# Patient Record
Sex: MALE | Race: WHITE | Employment: FULL TIME | URBAN - METROPOLITAN AREA
[De-identification: names, ages, dates, MRNs, and addresses within clinical notes are randomized per-mention and may not be internally consistent; named-entity substitution may affect disease eponyms.]

---

## 2017-03-06 ENCOUNTER — OFFICE VISIT (OUTPATIENT)
Dept: FAMILY MEDICINE CLINIC | Age: 57
End: 2017-03-06

## 2017-03-06 VITALS
DIASTOLIC BLOOD PRESSURE: 62 MMHG | BODY MASS INDEX: 35.05 KG/M2 | SYSTOLIC BLOOD PRESSURE: 122 MMHG | RESPIRATION RATE: 16 BRPM | HEART RATE: 68 BPM | WEIGHT: 244.8 LBS | HEIGHT: 70 IN

## 2017-03-06 DIAGNOSIS — M15.9 PRIMARY OSTEOARTHRITIS INVOLVING MULTIPLE JOINTS: ICD-10-CM

## 2017-03-06 DIAGNOSIS — G47.33 OBSTRUCTIVE SLEEP APNEA ON CPAP: ICD-10-CM

## 2017-03-06 DIAGNOSIS — E78.00 HYPERCHOLESTEROLEMIA: ICD-10-CM

## 2017-03-06 DIAGNOSIS — Z99.89 OBSTRUCTIVE SLEEP APNEA ON CPAP: ICD-10-CM

## 2017-03-06 DIAGNOSIS — Z51.81 MEDICATION MONITORING ENCOUNTER: ICD-10-CM

## 2017-03-06 DIAGNOSIS — I10 ESSENTIAL HYPERTENSION: Primary | ICD-10-CM

## 2017-03-06 DIAGNOSIS — R73.01 IFG (IMPAIRED FASTING GLUCOSE): ICD-10-CM

## 2017-03-06 PROCEDURE — G8427 DOCREV CUR MEDS BY ELIG CLIN: HCPCS | Performed by: FAMILY MEDICINE

## 2017-03-06 PROCEDURE — 3017F COLORECTAL CA SCREEN DOC REV: CPT | Performed by: FAMILY MEDICINE

## 2017-03-06 PROCEDURE — G8484 FLU IMMUNIZE NO ADMIN: HCPCS | Performed by: FAMILY MEDICINE

## 2017-03-06 PROCEDURE — 1036F TOBACCO NON-USER: CPT | Performed by: FAMILY MEDICINE

## 2017-03-06 PROCEDURE — 93000 ELECTROCARDIOGRAM COMPLETE: CPT | Performed by: FAMILY MEDICINE

## 2017-03-06 PROCEDURE — 99214 OFFICE O/P EST MOD 30 MIN: CPT | Performed by: FAMILY MEDICINE

## 2017-03-06 PROCEDURE — G8417 CALC BMI ABV UP PARAM F/U: HCPCS | Performed by: FAMILY MEDICINE

## 2017-03-06 ASSESSMENT — PATIENT HEALTH QUESTIONNAIRE - PHQ9
1. LITTLE INTEREST OR PLEASURE IN DOING THINGS: 1
SUM OF ALL RESPONSES TO PHQ QUESTIONS 1-9: 2
SUM OF ALL RESPONSES TO PHQ9 QUESTIONS 1 & 2: 2
2. FEELING DOWN, DEPRESSED OR HOPELESS: 1

## 2017-03-06 ASSESSMENT — ENCOUNTER SYMPTOMS
GASTROINTESTINAL NEGATIVE: 1
EYES NEGATIVE: 1
COUGH: 0
DIARRHEA: 0
NAUSEA: 0
APNEA: 1
WHEEZING: 0

## 2017-03-09 LAB
ABSOLUTE BASO #: 0 K/UL (ref 0–0.1)
ABSOLUTE EOS #: 0.1 K/UL (ref 0.1–0.4)
ABSOLUTE LYMPH #: 1.7 K/UL (ref 0.8–5.2)
ABSOLUTE MONO #: 0.4 K/UL (ref 0.1–0.9)
ABSOLUTE NEUT #: 1.8 K/UL (ref 1.3–9.1)
ALBUMIN SERPL-MCNC: 4.8 G/DL (ref 3.2–5.3)
ALK PHOSPHATASE: 46 IU/L (ref 35–121)
ALT SERPL-CCNC: 20 IU/L (ref 5–59)
ANION GAP SERPL CALCULATED.3IONS-SCNC: 12 MMOL/L
AST SERPL-CCNC: 17 IU/L (ref 10–42)
AVERAGE GLUCOSE: 108 MG/DL (ref 66–114)
BASOPHILS RELATIVE PERCENT: 0.3 % (ref 0–1)
BILIRUB SERPL-MCNC: 0.4 MG/DL (ref 0.2–1.3)
BUN BLDV-MCNC: 19 MG/DL (ref 10–20)
CALCIUM SERPL-MCNC: 10.1 MG/DL (ref 8.7–10.8)
CHLORIDE BLD-SCNC: 104 MMOL/L (ref 95–111)
CHOLESTEROL, TOTAL: 220 MG/DL
CHOLESTEROL/HDL RATIO: 3.4
CHOLESTEROL/HDL RATIO: 3.4
CHOLESTEROL: 220 MG/DL
CO2: 27 MMOL/L (ref 21–32)
CREAT SERPL-MCNC: 1.1 MG/DL (ref 0.5–1.3)
EGFR AFRICAN AMERICAN: 84
EGFR IF NONAFRICAN AMERICAN: 69
EOSINOPHILS RELATIVE PERCENT: 1.3 % (ref 1–4)
GLUCOSE: 98 MG/DL (ref 70–100)
HBA1C MFR BLD: 5.4 %
HBA1C MFR BLD: 5.4 % (ref 4.2–5.8)
HCT VFR BLD CALC: 44 % (ref 41.4–51)
HDLC SERPL-MCNC: 65 MG/DL (ref 35–70)
HDLC SERPL-MCNC: 65 MG/DL (ref 40–60)
HEMOGLOBIN: 15.2 G/DL (ref 13.8–17)
LDL CHOLESTEROL CALCULATED: 142 MG/DL
LDL CHOLESTEROL CALCULATED: 142 MG/DL (ref 0–160)
LDL/HDL RATIO: 2.2
LYMPHOCYTE %: 42.7 % (ref 16–48)
MCH RBC QN AUTO: 32.8 PG (ref 27–34)
MCHC RBC AUTO-ENTMCNC: 34.5 G/DL (ref 31–36)
MCV RBC AUTO: 94.8 FL (ref 80–100)
MONOCYTES # BLD: 10.1 % (ref 1–8)
NEUTROPHILS RELATIVE PERCENT: 45.3 % (ref 45–75)
PDW BLD-RTO: 12.2 % (ref 10.8–14.8)
PLATELETS: 165 K/UL (ref 150–450)
POTASSIUM SERPL-SCNC: 4.4 MMOL/L (ref 3.5–5.4)
RBC: 4.64 M/UL (ref 4–5.5)
SODIUM BLD-SCNC: 139 MMOL/L (ref 134–147)
TOTAL PROTEIN: 7.2 G/DL (ref 5.8–8)
TRIGL SERPL-MCNC: 66 MG/DL
TRIGL SERPL-MCNC: 66 MG/DL
VLDLC SERPL CALC-MCNC: 13 MG/DL
VLDLC SERPL CALC-MCNC: 13 MG/DL
WBC: 4 K/UL (ref 3.7–10.8)

## 2017-05-04 DIAGNOSIS — I10 ESSENTIAL HYPERTENSION: ICD-10-CM

## 2017-05-04 DIAGNOSIS — M48.02 CERVICAL STENOSIS OF SPINAL CANAL: ICD-10-CM

## 2017-05-04 DIAGNOSIS — M54.31 SCIATICA OF RIGHT SIDE: ICD-10-CM

## 2017-05-04 RX ORDER — IBUPROFEN 800 MG/1
TABLET ORAL
Qty: 360 TABLET | Refills: 1 | Status: SHIPPED | OUTPATIENT
Start: 2017-05-04 | End: 2017-10-31 | Stop reason: SDUPTHER

## 2017-05-04 RX ORDER — LISINOPRIL 20 MG/1
TABLET ORAL
Qty: 90 TABLET | Refills: 1 | Status: SHIPPED | OUTPATIENT
Start: 2017-05-04 | End: 2017-10-31 | Stop reason: SDUPTHER

## 2017-09-07 ENCOUNTER — OFFICE VISIT (OUTPATIENT)
Dept: FAMILY MEDICINE CLINIC | Age: 57
End: 2017-09-07
Payer: COMMERCIAL

## 2017-09-07 VITALS
TEMPERATURE: 98.6 F | HEIGHT: 70 IN | SYSTOLIC BLOOD PRESSURE: 110 MMHG | HEART RATE: 68 BPM | BODY MASS INDEX: 35.5 KG/M2 | WEIGHT: 248 LBS | RESPIRATION RATE: 16 BRPM | DIASTOLIC BLOOD PRESSURE: 70 MMHG

## 2017-09-07 DIAGNOSIS — N52.01 ERECTILE DYSFUNCTION DUE TO ARTERIAL INSUFFICIENCY: ICD-10-CM

## 2017-09-07 DIAGNOSIS — G47.33 OBSTRUCTIVE SLEEP APNEA ON CPAP: ICD-10-CM

## 2017-09-07 DIAGNOSIS — Z51.81 MEDICATION MONITORING ENCOUNTER: ICD-10-CM

## 2017-09-07 DIAGNOSIS — R25.2 MUSCLE CRAMPS: ICD-10-CM

## 2017-09-07 DIAGNOSIS — E66.9 OBESITY (BMI 30-39.9): ICD-10-CM

## 2017-09-07 DIAGNOSIS — R73.01 IFG (IMPAIRED FASTING GLUCOSE): ICD-10-CM

## 2017-09-07 DIAGNOSIS — M48.061 LUMBAR SPINAL STENOSIS: ICD-10-CM

## 2017-09-07 DIAGNOSIS — E78.00 HYPERCHOLESTEROLEMIA: ICD-10-CM

## 2017-09-07 DIAGNOSIS — M15.9 PRIMARY OSTEOARTHRITIS INVOLVING MULTIPLE JOINTS: ICD-10-CM

## 2017-09-07 DIAGNOSIS — I10 ESSENTIAL HYPERTENSION: Primary | ICD-10-CM

## 2017-09-07 DIAGNOSIS — Z99.89 OBSTRUCTIVE SLEEP APNEA ON CPAP: ICD-10-CM

## 2017-09-07 PROCEDURE — G8427 DOCREV CUR MEDS BY ELIG CLIN: HCPCS | Performed by: FAMILY MEDICINE

## 2017-09-07 PROCEDURE — 3017F COLORECTAL CA SCREEN DOC REV: CPT | Performed by: FAMILY MEDICINE

## 2017-09-07 PROCEDURE — G8417 CALC BMI ABV UP PARAM F/U: HCPCS | Performed by: FAMILY MEDICINE

## 2017-09-07 PROCEDURE — 99214 OFFICE O/P EST MOD 30 MIN: CPT | Performed by: FAMILY MEDICINE

## 2017-09-07 PROCEDURE — 1036F TOBACCO NON-USER: CPT | Performed by: FAMILY MEDICINE

## 2017-09-07 RX ORDER — SILDENAFIL 100 MG/1
100 TABLET, FILM COATED ORAL PRN
Qty: 18 TABLET | Refills: 3 | Status: SHIPPED | OUTPATIENT
Start: 2017-09-07 | End: 2018-03-08 | Stop reason: SDUPTHER

## 2017-09-07 ASSESSMENT — ENCOUNTER SYMPTOMS
COUGH: 0
BACK PAIN: 1
GASTROINTESTINAL NEGATIVE: 1
EYES NEGATIVE: 1
RHINORRHEA: 1
RESPIRATORY NEGATIVE: 1

## 2017-10-31 DIAGNOSIS — M48.02 CERVICAL STENOSIS OF SPINAL CANAL: ICD-10-CM

## 2017-10-31 DIAGNOSIS — I10 ESSENTIAL HYPERTENSION: ICD-10-CM

## 2017-10-31 DIAGNOSIS — M54.31 SCIATICA OF RIGHT SIDE: ICD-10-CM

## 2017-10-31 RX ORDER — LISINOPRIL 20 MG/1
TABLET ORAL
Qty: 90 TABLET | Refills: 1 | Status: SHIPPED | OUTPATIENT
Start: 2017-10-31 | End: 2018-03-08 | Stop reason: SDUPTHER

## 2017-10-31 RX ORDER — IBUPROFEN 800 MG/1
TABLET ORAL
Qty: 360 TABLET | Refills: 1 | Status: SHIPPED | OUTPATIENT
Start: 2017-10-31 | End: 2018-03-08 | Stop reason: SDUPTHER

## 2018-03-08 ENCOUNTER — OFFICE VISIT (OUTPATIENT)
Dept: FAMILY MEDICINE CLINIC | Age: 58
End: 2018-03-08
Payer: COMMERCIAL

## 2018-03-08 VITALS
HEIGHT: 70 IN | HEART RATE: 68 BPM | BODY MASS INDEX: 36.08 KG/M2 | OXYGEN SATURATION: 98 % | RESPIRATION RATE: 14 BRPM | DIASTOLIC BLOOD PRESSURE: 80 MMHG | SYSTOLIC BLOOD PRESSURE: 138 MMHG | WEIGHT: 252 LBS

## 2018-03-08 DIAGNOSIS — N52.01 ERECTILE DYSFUNCTION DUE TO ARTERIAL INSUFFICIENCY: ICD-10-CM

## 2018-03-08 DIAGNOSIS — M48.02 CERVICAL STENOSIS OF SPINAL CANAL: ICD-10-CM

## 2018-03-08 DIAGNOSIS — Z51.81 MEDICATION MONITORING ENCOUNTER: ICD-10-CM

## 2018-03-08 DIAGNOSIS — R73.01 IFG (IMPAIRED FASTING GLUCOSE): ICD-10-CM

## 2018-03-08 DIAGNOSIS — H69.81 DYSFUNCTION OF RIGHT EUSTACHIAN TUBE: ICD-10-CM

## 2018-03-08 DIAGNOSIS — F34.1 DYSTHYMIA: ICD-10-CM

## 2018-03-08 DIAGNOSIS — E78.00 HYPERCHOLESTEROLEMIA: ICD-10-CM

## 2018-03-08 DIAGNOSIS — I10 ESSENTIAL HYPERTENSION: Primary | ICD-10-CM

## 2018-03-08 DIAGNOSIS — M54.31 SCIATICA OF RIGHT SIDE: ICD-10-CM

## 2018-03-08 PROBLEM — H69.91 DYSFUNCTION OF RIGHT EUSTACHIAN TUBE: Status: ACTIVE | Noted: 2018-03-08

## 2018-03-08 PROCEDURE — 3017F COLORECTAL CA SCREEN DOC REV: CPT | Performed by: FAMILY MEDICINE

## 2018-03-08 PROCEDURE — 99214 OFFICE O/P EST MOD 30 MIN: CPT | Performed by: FAMILY MEDICINE

## 2018-03-08 PROCEDURE — G8417 CALC BMI ABV UP PARAM F/U: HCPCS | Performed by: FAMILY MEDICINE

## 2018-03-08 PROCEDURE — G8484 FLU IMMUNIZE NO ADMIN: HCPCS | Performed by: FAMILY MEDICINE

## 2018-03-08 PROCEDURE — 1036F TOBACCO NON-USER: CPT | Performed by: FAMILY MEDICINE

## 2018-03-08 PROCEDURE — G8427 DOCREV CUR MEDS BY ELIG CLIN: HCPCS | Performed by: FAMILY MEDICINE

## 2018-03-08 RX ORDER — LISINOPRIL 20 MG/1
TABLET ORAL
Qty: 90 TABLET | Refills: 3 | Status: SHIPPED | OUTPATIENT
Start: 2018-03-08 | End: 2018-04-29 | Stop reason: SDUPTHER

## 2018-03-08 RX ORDER — IBUPROFEN 800 MG/1
800 TABLET ORAL EVERY 6 HOURS PRN
Qty: 360 TABLET | Refills: 1 | Status: SHIPPED | OUTPATIENT
Start: 2018-03-08 | End: 2018-04-29 | Stop reason: SDUPTHER

## 2018-03-08 RX ORDER — SILDENAFIL 100 MG/1
100 TABLET, FILM COATED ORAL PRN
Qty: 18 TABLET | Refills: 3 | Status: SHIPPED | OUTPATIENT
Start: 2018-03-08 | End: 2019-02-20 | Stop reason: SDUPTHER

## 2018-03-08 ASSESSMENT — ENCOUNTER SYMPTOMS
EYES NEGATIVE: 1
SINUS PAIN: 0
SINUS PRESSURE: 0
ALLERGIC/IMMUNOLOGIC NEGATIVE: 1
GASTROINTESTINAL NEGATIVE: 1
RESPIRATORY NEGATIVE: 1

## 2018-03-08 ASSESSMENT — PATIENT HEALTH QUESTIONNAIRE - PHQ9
SUM OF ALL RESPONSES TO PHQ9 QUESTIONS 1 & 2: 0
2. FEELING DOWN, DEPRESSED OR HOPELESS: 0
SUM OF ALL RESPONSES TO PHQ QUESTIONS 1-9: 0
1. LITTLE INTEREST OR PLEASURE IN DOING THINGS: 0

## 2018-03-08 NOTE — PROGRESS NOTES
referral at this point. He is interested in trying to get copies of his old imaging done out at [address] [place] [address]. I recommended he contact them to see if he can get a copy of the original films. If his symptoms progress, I will make referral to a spinal orthopedist of his choice or neurosurgeon. His dysthymia is under good control currently without any antidepressant medications. As a Teresa has not been used for several years. The rest of this patient's conditions are stable. Past medical and surgical hx reviewed. Past Medical History:   Diagnosis Date    Bursitis     Chest pain     Erectile dysfunction     Hyperlipidemia     Hypertension     Sleep apnea      Past Surgical History:   Procedure Laterality Date    CARDIAC CATHETERIZATION  6/30/2011    WISDOM TOOTH EXTRACTION       Portions of this note were completed with a voice recording program.  Efforts were made to edit the dictations but occasionally words are mis-transcribed. Review of Systems   Constitutional: Negative. HENT: Positive for congestion, ear pain and hearing loss. Negative for sinus pain and sinus pressure. Eyes: Negative. Respiratory: Negative. Cardiovascular: Negative. Gastrointestinal: Negative. Endocrine: Negative. Negative for polydipsia, polyphagia and polyuria. Genitourinary: Negative. Negative for difficulty urinating. Musculoskeletal: Positive for arthralgias, neck pain and neck stiffness. Skin: Negative. Allergic/Immunologic: Negative. Neurological: Positive for headaches. Negative for numbness. Hematological: Negative. Psychiatric/Behavioral: Negative. All other systems reviewed and are negative. Objective:   Physical Exam   Constitutional: He is oriented to person, place, and time. He appears well-developed and well-nourished. HENT:   Right Ear: External ear and ear canal normal. Tympanic membrane is retracted. Decreased hearing is noted.    Left Ear: Hearing, tympanic membrane, external ear and ear canal normal.   Nose: Nose normal.   Mouth/Throat: Oropharynx is clear and moist.   Eyes: Conjunctivae are normal.   Neck: Spinous process tenderness and muscular tenderness present. Decreased range of motion present. No thyromegaly present. Cardiovascular: Normal rate, regular rhythm, S1 normal, S2 normal, normal heart sounds and intact distal pulses. No extrasystoles are present. Exam reveals no gallop. No murmur heard. Pulmonary/Chest: Effort normal and breath sounds normal. He has no wheezes. He has no rales. Abdominal: Soft. Bowel sounds are normal.   Musculoskeletal: He exhibits no edema. Cervical back: He exhibits decreased range of motion and tenderness. Lymphadenopathy:     He has no cervical adenopathy. Neurological: He is alert and oriented to person, place, and time. Skin: Skin is warm and dry. Psychiatric: He has a normal mood and affect. Nursing note and vitals reviewed. Assessment:      1. Essential hypertension  verapamil (CALAN SR) 180 MG extended release tablet    lisinopril (PRINIVIL;ZESTRIL) 20 MG tablet    CBC With Auto Differential    Lipid Panel    Comprehensive Metabolic Panel   2. Sciatica of right side, intermittent. ibuprofen (ADVIL;MOTRIN) 800 MG tablet   3. Cervical stenosis of spinal canal  ibuprofen (ADVIL;MOTRIN) 800 MG tablet   4. Erectile dysfunction due to arterial insufficiency  sildenafil (VIAGRA) 100 MG tablet   5. Hypercholesterolemia  Lipid Panel    Comprehensive Metabolic Panel   6. IFG (impaired fasting glucose)  Comprehensive Metabolic Panel    Hemoglobin A1C   7. Dysthymia     8. Medication monitoring encounter  CBC With Auto Differential    Lipid Panel    Comprehensive Metabolic Panel    Hemoglobin A1C   9.  Dysfunction of right eustachian tube             Plan:      Orders Placed This Encounter   Procedures    CBC With Auto Differential     Standing Status:   Future     Standing Expiration

## 2018-03-08 NOTE — PATIENT INSTRUCTIONS
You may receive a survey about your visit with us today. The feedback from our patients helps us identify what is working well and where the service to all patients can be enhanced. Thank you! Patient Education        Acute High Blood Pressure: Care Instructions  Your Care Instructions    Acute high blood pressure is very high blood pressure. It's a serious problem. Very high blood pressure can damage your brain, heart, eyes, and kidneys. You may have been given medicines to lower your blood pressure. You may have gotten them as pills or through a needle in one of your veins. This is called an IV. And maybe you were given other medicines too. These can be needed when high blood pressure causes other problems. To keep your blood pressure at a lower level, you may need to make healthy lifestyle changes. And you will probably need to take medicines. Be sure to follow up with your doctor about your blood pressure and what you can do about it. Follow-up care is a key part of your treatment and safety. Be sure to make and go to all appointments, and call your doctor if you are having problems. It's also a good idea to know your test results and keep a list of the medicines you take. How can you care for yourself at home? · See your doctor as often as he or she recommends. This is to make sure your blood pressure is under control. You will probably go at least 2 times a year. But it may be more often at first.  · Take your blood pressure medicine exactly as prescribed. You may take one or more types. They include diuretics, beta-blockers, ACE inhibitors, calcium channel blockers, and angiotensin II receptor blockers. Call your doctor if you think you are having a problem with your medicine. · If you take blood pressure medicine, talk to your doctor before you take decongestants or anti-inflammatory medicine, such as ibuprofen. These can raise blood pressure. · Learn how to check your blood pressure at home. your skin. Do not use a heating pad with children. · If your doctor prescribed antibiotics, take them as directed. Do not stop taking them just because you feel better. You need to take the full course of antibiotics. · Your doctor may recommend over-the-counter medicine. Be safe with medicines. Oral or nasal decongestants may relieve ear pain. Avoid decongestants that are combined with antihistamines, which tend to cause more blockage. But if allergies seem to be the problem, your doctor may recommend a combination. Be careful with cough and cold medicines. Don't give them to children younger than 6, because they don't work for children that age and can even be harmful. For children 6 and older, always follow all the instructions carefully. Make sure you know how much medicine to give and how long to use it. And use the dosing device if one is included. When should you call for help? Call your doctor now or seek immediate medical care if:  ? · You develop sudden, complete hearing loss. ? · You have severe pain or feel dizzy. ? · You have new or increasing pus or blood draining from your ear. ? · You have redness, swelling, or pain around or behind the ear. ? Watch closely for changes in your health, and be sure to contact your doctor if:  ? · You do not get better after 2 weeks. ? · You have any new symptoms, such as itching or a feeling of fullness in the ear. Where can you learn more? Go to [email or web address]://[email or web address]. org and sign in to your [employer] account. Enter Y822 in the [facility] box to learn more about \"Eustachian Tube Problems: Care Instructions. \"     If you do not have an account, please click on the \"Sign Up Now\" link. Current as of: May 12, 2017  Content Version: 11.5  © 9192-3246 Healthwise, Incorporated. Care instructions adapted under license by [facility] [employer] [facility] ([facility]).  If you have questions about a medical condition or this instruction, always ask your healthcare

## 2018-03-08 NOTE — PROGRESS NOTES
Chronic Disease Visit Information    BP Readings from Last 3 Encounters:   03/08/18 138/80   09/07/17 110/70   03/06/17 122/62          Hemoglobin A1C (%)   Date Value   03/09/2017 5.4   03/08/2017 5.4   03/03/2016 5.0     LDL Calculated (mg/dL)   Date Value   03/09/2017 142     HDL (mg/dL)   Date Value   03/09/2017 65     BUN (mg/dl)   Date Value   03/08/2017 19     CREATININE (mg/dl)   Date Value   03/08/2017 1.1     Glucose (mg/dl)   Date Value   03/08/2017 98            Have you changed or started any medications since your last visit including any over-the-counter medicines, vitamins, or herbal medicines? no   Are you having any side effects from any of your medications? -  no  Have you stopped taking any of your medications? Is so, why? -  no    Have you seen any other physician or provider since your last visit? No  Have you had any other diagnostic tests since your last visit? No  Have you been seen in the emergency room and/or had an admission to a hospital since we last saw you? No     Have you had your routine dental cleaning in the past 6 months? no    Have you activated your [employer] account? If not, what are your barriers?  Yes     Patient Care Team:  Bill Kirkpatrick MD as PCP - General (Family Medicine)         Medical History Review  Past Medical, Family, and Social History reviewed and does contribute to the patient presenting condition    Health Maintenance   Topic Date Due    Hepatitis C screen  1960    HIV screen  06/15/1976    DTaP/Tdap/Td vaccine (1 - Tdap) 06/15/1980    Shingles Vaccine (1 of 2 - 2 Dose Series) 06/15/2011    Flu vaccine (1) 09/01/2017    Potassium monitoring  03/08/2018    Creatinine monitoring  03/08/2018    A1C test (Diabetic or Prediabetic)  03/09/2018    Lipid screen  03/09/2022    Colon cancer screen colonoscopy  06/28/2026

## 2018-03-09 LAB
ABSOLUTE BASO #: 0 K/UL (ref 0–0.1)
ABSOLUTE EOS #: 0.1 K/UL (ref 0.1–0.4)
ABSOLUTE LYMPH #: 1.6 K/UL (ref 0.8–5.2)
ABSOLUTE MONO #: 0.4 K/UL (ref 0.1–0.9)
ABSOLUTE NEUT #: 1.8 K/UL (ref 1.3–9.1)
ALBUMIN SERPL-MCNC: 4.7 G/DL (ref 3.5–5.2)
ALK PHOSPHATASE: 54 U/L (ref 39–118)
ALT SERPL-CCNC: 16 U/L (ref 5–50)
ANION GAP SERPL CALCULATED.3IONS-SCNC: 13 MEQ/L (ref 10–19)
AST SERPL-CCNC: 17 U/L (ref 9–50)
AVERAGE GLUCOSE: 108 MG/DL (ref 66–114)
BASOPHILS RELATIVE PERCENT: 0.8 %
BILIRUB SERPL-MCNC: 0.3 MG/DL
BUN BLDV-MCNC: 22 MG/DL (ref 8–23)
CALCIUM SERPL-MCNC: 9.9 MG/DL (ref 8.5–10.5)
CHLORIDE BLD-SCNC: 102 MEQ/L (ref 95–107)
CHOLESTEROL/HDL RATIO: 3.1
CHOLESTEROL: 176 MG/DL
CO2: 27 MEQ/L (ref 19–31)
CREAT SERPL-MCNC: 1 MG/DL (ref 0.8–1.4)
EGFR AFRICAN AMERICAN: 97.1 ML/MIN/1.73 M2
EGFR IF NONAFRICAN AMERICAN: 83.8 ML/MIN/1.73 M2
EOSINOPHILS RELATIVE PERCENT: 2.1 %
GLUCOSE: 92 MG/DL (ref 70–99)
HBA1C MFR BLD: 5.4 % (ref 4.2–5.8)
HCT VFR BLD CALC: 44 % (ref 41.4–51)
HDLC SERPL-MCNC: 57 MG/DL
HEMOGLOBIN: 15.3 G/DL (ref 13.8–17)
LDL CHOLESTEROL CALCULATED: 108 MG/DL
LDL/HDL RATIO: 1.9
LYMPHOCYTE %: 40.5 %
MCH RBC QN AUTO: 33.5 PG (ref 27–34)
MCHC RBC AUTO-ENTMCNC: 34.8 G/DL (ref 31–36)
MCV RBC AUTO: 96.3 FL (ref 80–100)
MONOCYTES # BLD: 10.1 %
NEUTROPHILS RELATIVE PERCENT: 46.2 %
PDW BLD-RTO: 12 % (ref 10.8–14.8)
PLATELETS: 228 K/UL (ref 150–450)
POTASSIUM SERPL-SCNC: 4.7 MEQ/L (ref 3.5–5.4)
RBC: 4.57 M/UL (ref 4–5.5)
SODIUM BLD-SCNC: 142 MEQ/L (ref 135–146)
TOTAL PROTEIN: 7.3 G/DL (ref 6.1–8.3)
TRIGL SERPL-MCNC: 55 MG/DL
VLDLC SERPL CALC-MCNC: 11 MG/DL
WBC: 3.9 K/UL (ref 3.7–10.8)

## 2018-04-29 DIAGNOSIS — M48.02 CERVICAL STENOSIS OF SPINAL CANAL: ICD-10-CM

## 2018-04-29 DIAGNOSIS — I10 ESSENTIAL HYPERTENSION: ICD-10-CM

## 2018-04-29 DIAGNOSIS — M54.31 SCIATICA OF RIGHT SIDE: ICD-10-CM

## 2018-04-30 RX ORDER — IBUPROFEN 800 MG/1
TABLET ORAL
Qty: 360 TABLET | Refills: 1 | Status: SHIPPED | OUTPATIENT
Start: 2018-04-30 | End: 2018-09-12 | Stop reason: SDUPTHER

## 2018-04-30 RX ORDER — LISINOPRIL 20 MG/1
TABLET ORAL
Qty: 90 TABLET | Refills: 1 | Status: SHIPPED | OUTPATIENT
Start: 2018-04-30 | End: 2018-09-12 | Stop reason: SDUPTHER

## 2018-05-10 ENCOUNTER — OFFICE VISIT (OUTPATIENT)
Dept: PULMONOLOGY | Age: 58
End: 2018-05-10
Payer: COMMERCIAL

## 2018-05-10 VITALS
DIASTOLIC BLOOD PRESSURE: 72 MMHG | HEIGHT: 70 IN | RESPIRATION RATE: 15 BRPM | BODY MASS INDEX: 33.64 KG/M2 | WEIGHT: 235 LBS | HEART RATE: 67 BPM | SYSTOLIC BLOOD PRESSURE: 124 MMHG | OXYGEN SATURATION: 97 %

## 2018-05-10 DIAGNOSIS — G47.33 OBSTRUCTIVE SLEEP APNEA ON CPAP: Primary | ICD-10-CM

## 2018-05-10 DIAGNOSIS — Z99.89 OBSTRUCTIVE SLEEP APNEA ON CPAP: Primary | ICD-10-CM

## 2018-05-10 DIAGNOSIS — E66.9 OBESITY (BMI 30-39.9): ICD-10-CM

## 2018-05-10 PROCEDURE — 1036F TOBACCO NON-USER: CPT | Performed by: PHYSICIAN ASSISTANT

## 2018-05-10 PROCEDURE — G8417 CALC BMI ABV UP PARAM F/U: HCPCS | Performed by: PHYSICIAN ASSISTANT

## 2018-05-10 PROCEDURE — G8427 DOCREV CUR MEDS BY ELIG CLIN: HCPCS | Performed by: PHYSICIAN ASSISTANT

## 2018-05-10 PROCEDURE — 99213 OFFICE O/P EST LOW 20 MIN: CPT | Performed by: PHYSICIAN ASSISTANT

## 2018-05-10 PROCEDURE — 3017F COLORECTAL CA SCREEN DOC REV: CPT | Performed by: PHYSICIAN ASSISTANT

## 2018-05-10 ASSESSMENT — ENCOUNTER SYMPTOMS
SINUS PAIN: 0
NAUSEA: 0
SORE THROAT: 0
HEARTBURN: 0
ORTHOPNEA: 0
COUGH: 0
SPUTUM PRODUCTION: 0
EYES NEGATIVE: 1
RESPIRATORY NEGATIVE: 1
GASTROINTESTINAL NEGATIVE: 1
WHEEZING: 0
SHORTNESS OF BREATH: 0

## 2018-05-13 DIAGNOSIS — I10 ESSENTIAL HYPERTENSION: ICD-10-CM

## 2018-08-24 DIAGNOSIS — M54.31 SCIATICA OF RIGHT SIDE: ICD-10-CM

## 2018-08-24 DIAGNOSIS — M48.02 CERVICAL STENOSIS OF SPINAL CANAL: ICD-10-CM

## 2018-08-24 RX ORDER — IBUPROFEN 800 MG/1
TABLET ORAL
Qty: 360 TABLET | Refills: 1 | Status: SHIPPED | OUTPATIENT
Start: 2018-08-24 | End: 2018-09-12

## 2018-09-12 ENCOUNTER — OFFICE VISIT (OUTPATIENT)
Dept: FAMILY MEDICINE CLINIC | Age: 58
End: 2018-09-12
Payer: COMMERCIAL

## 2018-09-12 VITALS
DIASTOLIC BLOOD PRESSURE: 72 MMHG | HEIGHT: 70 IN | SYSTOLIC BLOOD PRESSURE: 128 MMHG | WEIGHT: 244.8 LBS | OXYGEN SATURATION: 98 % | RESPIRATION RATE: 13 BRPM | BODY MASS INDEX: 35.05 KG/M2 | HEART RATE: 65 BPM

## 2018-09-12 DIAGNOSIS — Z51.81 MEDICATION MONITORING ENCOUNTER: ICD-10-CM

## 2018-09-12 DIAGNOSIS — M54.31 SCIATICA OF RIGHT SIDE: ICD-10-CM

## 2018-09-12 DIAGNOSIS — E78.00 HYPERCHOLESTEROLEMIA: ICD-10-CM

## 2018-09-12 DIAGNOSIS — R73.01 IFG (IMPAIRED FASTING GLUCOSE): ICD-10-CM

## 2018-09-12 DIAGNOSIS — M48.02 CERVICAL STENOSIS OF SPINAL CANAL: ICD-10-CM

## 2018-09-12 DIAGNOSIS — G47.33 OBSTRUCTIVE SLEEP APNEA ON CPAP: ICD-10-CM

## 2018-09-12 DIAGNOSIS — Z99.89 OBSTRUCTIVE SLEEP APNEA ON CPAP: ICD-10-CM

## 2018-09-12 DIAGNOSIS — I10 ESSENTIAL HYPERTENSION: Primary | ICD-10-CM

## 2018-09-12 DIAGNOSIS — R25.2 MUSCLE CRAMPS: ICD-10-CM

## 2018-09-12 PROCEDURE — G8427 DOCREV CUR MEDS BY ELIG CLIN: HCPCS | Performed by: FAMILY MEDICINE

## 2018-09-12 PROCEDURE — 1036F TOBACCO NON-USER: CPT | Performed by: FAMILY MEDICINE

## 2018-09-12 PROCEDURE — 99214 OFFICE O/P EST MOD 30 MIN: CPT | Performed by: FAMILY MEDICINE

## 2018-09-12 PROCEDURE — G8417 CALC BMI ABV UP PARAM F/U: HCPCS | Performed by: FAMILY MEDICINE

## 2018-09-12 PROCEDURE — 3017F COLORECTAL CA SCREEN DOC REV: CPT | Performed by: FAMILY MEDICINE

## 2018-09-12 RX ORDER — IBUPROFEN 800 MG/1
TABLET ORAL
Qty: 360 TABLET | Refills: 3 | Status: SHIPPED | OUTPATIENT
Start: 2018-09-12 | End: 2019-09-24 | Stop reason: SDUPTHER

## 2018-09-12 RX ORDER — AMPICILLIN TRIHYDRATE 250 MG
1 CAPSULE ORAL DAILY
Qty: 90 CAPSULE | Refills: 3 | Status: SHIPPED | OUTPATIENT
Start: 2018-09-12 | End: 2019-09-24 | Stop reason: SDUPTHER

## 2018-09-12 RX ORDER — LISINOPRIL 20 MG/1
TABLET ORAL
Qty: 90 TABLET | Refills: 3 | Status: SHIPPED | OUTPATIENT
Start: 2018-09-12 | End: 2019-09-24 | Stop reason: SDUPTHER

## 2018-09-12 ASSESSMENT — ENCOUNTER SYMPTOMS
GASTROINTESTINAL NEGATIVE: 1
BACK PAIN: 1
RESPIRATORY NEGATIVE: 1
ALLERGIC/IMMUNOLOGIC NEGATIVE: 1

## 2018-09-12 NOTE — PROGRESS NOTES
Chronic Disease Visit Information    BP Readings from Last 3 Encounters:   05/10/18 124/72   03/08/18 138/80   09/07/17 110/70          Hemoglobin A1C (%)   Date Value   03/08/2018 5.4   03/09/2017 5.4   03/08/2017 5.4     LDL Calculated (mg/dL)   Date Value   03/08/2018 108     HDL (mg/dL)   Date Value   03/08/2018 57     BUN (mg/dL)   Date Value   03/08/2018 22     CREATININE (mg/dL)   Date Value   03/08/2018 1.0     Glucose (mg/dL)   Date Value   03/08/2018 92            Have you changed or started any medications since your last visit including any over-the-counter medicines, vitamins, or herbal medicines? yes - see med list    Are you having any side effects from any of your medications? -  no  Have you stopped taking any of your medications? Is so, why? -  no    Have you seen any other physician or provider since your last visit? No  Have you had any other diagnostic tests since your last visit? No  Have you been seen in the emergency room and/or had an admission to a hospital since we last saw you? No  Have you had your annual diabetic retinal (eye) exam? No  Have you had your routine dental cleaning in the past 6 months? yes -     Have you activated your [employer] account? If not, what are your barriers?  Yes     Patient Care Team:  Elia Villaseñor MD as PCP - General (Family Medicine)         Medical History Review  Past Medical, Family, and Social History reviewed and does contribute to the patient presenting condition    Health Maintenance   Topic Date Due    Hepatitis C screen  1960    HIV screen  06/15/1976    DTaP/Tdap/Td vaccine (1 - Tdap) 06/15/1980    Shingles Vaccine (1 of 2 - 2 Dose Series) 06/15/2011    Flu vaccine (1) 09/01/2018    A1C test (Diabetic or Prediabetic)  03/08/2019    Potassium monitoring  03/08/2019    Creatinine monitoring  03/08/2019    Lipid screen  03/08/2023    Colon cancer screen colonoscopy  06/28/2026

## 2018-09-12 NOTE — PROGRESS NOTES
Subjective:      Patient ID: Neal Gaffney is a 63 y.o. male. HPI  Follow up of chronic conditions. Encounter Diagnoses   Name Primary?  Essential hypertension Yes    Sciatica of right side, intermittent.  Cervical stenosis of spinal canal     Obstructive sleep apnea on CPAP     Hypercholesterolemia     IFG (impaired fasting glucose)     Muscle cramps     Medication monitoring encounter      HTN is stable with current medications. Pt has no medication side effects nor orthostatic symptoms. BP Readings from Last 3 Encounters:   09/12/18 128/72   05/10/18 124/72   03/08/18 138/80     Patient has started having episodes of night cramps in both legs and had a recent episode of some hand cramps which is totally new to him. He reached into his pocket to get his pen when both hands started to bother him. He has tried to use over-the-counter supplements on his own without success so I will give him some guidelines using potassium, magnesium, and calcium supplements. I also encouraged him to try and keep a moderate exercise routine on a regular basis in the hopes of decreasing his chances of cramps by keeping his muscles well conditioned. He continues to try and maintain his weight loss but he is up 9 pounds in the last 4 months. The need to drop this weight was discussed. As a consequence, he is now back into stage II obesity. Wt Readings from Last 3 Encounters:   09/12/18 244 lb 12.8 oz (111 kg)   05/10/18 235 lb (106.6 kg)   03/08/18 252 lb (114.3 kg)   Body mass index is 35.44 kg/m². He continues using CPAP for treatment of his obstructive sleep apnea. He continues to use red yeast Rice for treatment of his hypercholesterolemia and his current levels are good.   Cholesterol, Total (mg/dL)   Date Value   03/09/2017 220     Cholesterol (mg/dL)   Date Value   03/08/2018 176     HDL (mg/dL)   Date Value   03/08/2018 57     Triglycerides (mg/dL)   Date Value   03/08/2018 55     Lab is alert and oriented to person, place, and time. Skin: Skin is warm and dry. Psychiatric: He has a normal mood and affect. Nursing note and vitals reviewed. Assessment:       Diagnosis Orders   1. Essential hypertension  lisinopril (PRINIVIL;ZESTRIL) 20 MG tablet   2. Sciatica of right side, intermittent. ibuprofen (IBU) 800 MG tablet   3. Cervical stenosis of spinal canal  ibuprofen (IBU) 800 MG tablet   4. Obstructive sleep apnea on CPAP     5. Hypercholesterolemia  Red Yeast Rice 600 MG CAPS   6. IFG (impaired fasting glucose)     7. Muscle cramps     8. Medication monitoring encounter             Plan:      No orders of the defined types were placed in this encounter. Medications Discontinued During This Encounter   Medication Reason     MG tablet LIST CLEANUP    lisinopril (PRINIVIL;ZESTRIL) 20 MG tablet REORDER     MG tablet REORDER    Red Yeast Rice Extract (RED YEAST RICE PO) REORDER     Current Outpatient Prescriptions   Medication Sig Dispense Refill    lisinopril (PRINIVIL;ZESTRIL) 20 MG tablet TAKE 1 TABLET DAILY 90 tablet 3    ibuprofen (IBU) 800 MG tablet TAKE 1 TABLET EVERY 6 HOURS AS NEEDED 360 tablet 3    Red Yeast Rice 600 MG CAPS Take 1 capsule by mouth daily Take by mouth 2 times daily 90 capsule 3    verapamil (CALAN SR) 180 MG extended release tablet TAKE 1 TABLET DAILY 90 tablet 3    sildenafil (VIAGRA) 100 MG tablet Take 1 tablet by mouth as needed for Erectile Dysfunction 18 tablet 3    Probiotic Product (PROBIOTIC DAILY PO) Take by mouth daily      fish oil-omega-3 fatty acids 1000 MG capsule Take 2 g by mouth. Two tablets twice a day      vitamin B-12 (CYANOCOBALAMIN) 100 MCG tablet Take 50 mcg by mouth daily. No current facility-administered medications for this visit. Start the following minerals to stop cramps:   Start taking Calcium Citrate a total of 1000 - 1200 mg in divided dose with food.   Magnesium 400 or 500 mg

## 2019-02-20 DIAGNOSIS — N52.01 ERECTILE DYSFUNCTION DUE TO ARTERIAL INSUFFICIENCY: ICD-10-CM

## 2019-02-20 DIAGNOSIS — I10 ESSENTIAL HYPERTENSION: ICD-10-CM

## 2019-02-20 RX ORDER — SILDENAFIL 100 MG/1
TABLET, FILM COATED ORAL
Qty: 18 TABLET | Refills: 3 | Status: SHIPPED | OUTPATIENT
Start: 2019-02-20 | End: 2020-02-05

## 2019-02-21 ENCOUNTER — TELEPHONE (OUTPATIENT)
Dept: FAMILY MEDICINE CLINIC | Age: 59
End: 2019-02-21

## 2019-03-02 ENCOUNTER — PREP FOR PROCEDURE (OUTPATIENT)
Dept: FAMILY MEDICINE CLINIC | Age: 59
End: 2019-03-02

## 2019-03-21 ENCOUNTER — OFFICE VISIT (OUTPATIENT)
Dept: FAMILY MEDICINE CLINIC | Age: 59
End: 2019-03-21
Payer: COMMERCIAL

## 2019-03-21 VITALS
SYSTOLIC BLOOD PRESSURE: 136 MMHG | HEIGHT: 70 IN | OXYGEN SATURATION: 98 % | WEIGHT: 245.2 LBS | DIASTOLIC BLOOD PRESSURE: 72 MMHG | HEART RATE: 60 BPM | RESPIRATION RATE: 13 BRPM | BODY MASS INDEX: 35.1 KG/M2

## 2019-03-21 DIAGNOSIS — E78.00 HYPERCHOLESTEROLEMIA: ICD-10-CM

## 2019-03-21 DIAGNOSIS — M48.061 SPINAL STENOSIS OF LUMBAR REGION WITHOUT NEUROGENIC CLAUDICATION: ICD-10-CM

## 2019-03-21 DIAGNOSIS — R73.01 IFG (IMPAIRED FASTING GLUCOSE): ICD-10-CM

## 2019-03-21 DIAGNOSIS — F34.1 DYSTHYMIA: ICD-10-CM

## 2019-03-21 DIAGNOSIS — M15.9 PRIMARY OSTEOARTHRITIS INVOLVING MULTIPLE JOINTS: ICD-10-CM

## 2019-03-21 DIAGNOSIS — Z51.81 MEDICATION MONITORING ENCOUNTER: ICD-10-CM

## 2019-03-21 DIAGNOSIS — I10 ESSENTIAL HYPERTENSION: Primary | ICD-10-CM

## 2019-03-21 PROCEDURE — 3017F COLORECTAL CA SCREEN DOC REV: CPT | Performed by: FAMILY MEDICINE

## 2019-03-21 PROCEDURE — 1036F TOBACCO NON-USER: CPT | Performed by: FAMILY MEDICINE

## 2019-03-21 PROCEDURE — G8417 CALC BMI ABV UP PARAM F/U: HCPCS | Performed by: FAMILY MEDICINE

## 2019-03-21 PROCEDURE — G8484 FLU IMMUNIZE NO ADMIN: HCPCS | Performed by: FAMILY MEDICINE

## 2019-03-21 PROCEDURE — 99214 OFFICE O/P EST MOD 30 MIN: CPT | Performed by: FAMILY MEDICINE

## 2019-03-21 PROCEDURE — G8427 DOCREV CUR MEDS BY ELIG CLIN: HCPCS | Performed by: FAMILY MEDICINE

## 2019-03-21 ASSESSMENT — ENCOUNTER SYMPTOMS
GASTROINTESTINAL NEGATIVE: 1
RESPIRATORY NEGATIVE: 1
ALLERGIC/IMMUNOLOGIC NEGATIVE: 1
COUGH: 0

## 2019-03-23 LAB
ABSOLUTE BASO #: 0 K/UL (ref 0–0.1)
ABSOLUTE EOS #: 0 K/UL (ref 0.1–0.4)
ABSOLUTE LYMPH #: 1.2 K/UL (ref 0.8–5.2)
ABSOLUTE MONO #: 0.4 K/UL (ref 0.1–0.9)
ABSOLUTE NEUT #: 2.2 K/UL (ref 1.3–9.1)
ALBUMIN SERPL-MCNC: 4.6 G/DL (ref 3.5–5.2)
ALK PHOSPHATASE: 49 U/L (ref 39–118)
ALT SERPL-CCNC: 17 U/L (ref 5–50)
ANION GAP SERPL CALCULATED.3IONS-SCNC: 11 MEQ/L (ref 10–19)
AST SERPL-CCNC: 17 U/L (ref 9–50)
BASOPHILS RELATIVE PERCENT: 0.3 %
BILIRUB SERPL-MCNC: 0.5 MG/DL
BUN BLDV-MCNC: 19 MG/DL (ref 8–23)
CALCIUM SERPL-MCNC: 9.5 MG/DL (ref 8.5–10.5)
CHLORIDE BLD-SCNC: 104 MEQ/L (ref 95–107)
CHOLESTEROL/HDL RATIO: 3.7
CHOLESTEROL: 223 MG/DL
CO2: 28 MEQ/L (ref 19–31)
CREAT SERPL-MCNC: 1.1 MG/DL (ref 0.8–1.4)
EGFR AFRICAN AMERICAN: 85.9 ML/MIN/1.73 M2
EGFR IF NONAFRICAN AMERICAN: 74.1 ML/MIN/1.73 M2
EOSINOPHILS RELATIVE PERCENT: 1 %
GLUCOSE: 104 MG/DL (ref 70–99)
HCT VFR BLD CALC: 43.6 % (ref 41.4–51)
HDLC SERPL-MCNC: 60.6 MG/DL
HEMOGLOBIN: 15.1 G/DL (ref 13.8–17)
LDL CHOLESTEROL CALCULATED: 148 MG/DL
LDL/HDL RATIO: 2.4
LYMPHOCYTE %: 31.8 %
MCH RBC QN AUTO: 33.2 PG (ref 27–34)
MCHC RBC AUTO-ENTMCNC: 34.6 G/DL (ref 31–36)
MCV RBC AUTO: 95.8 FL (ref 80–100)
MONOCYTES # BLD: 9.6 %
NEUTROPHILS RELATIVE PERCENT: 57 %
PDW BLD-RTO: 12.1 % (ref 10.8–14.8)
PLATELETS: 178 K/UL (ref 150–450)
POTASSIUM SERPL-SCNC: 4.6 MEQ/L (ref 3.5–5.4)
RBC: 4.55 M/UL (ref 4–5.5)
SODIUM BLD-SCNC: 143 MEQ/L (ref 135–146)
TOTAL PROTEIN: 7.2 G/DL (ref 6.1–8.3)
TRIGL SERPL-MCNC: 73 MG/DL
VLDLC SERPL CALC-MCNC: 15 MG/DL
WBC: 3.8 K/UL (ref 3.7–10.8)

## 2019-05-29 DIAGNOSIS — I10 ESSENTIAL HYPERTENSION: ICD-10-CM

## 2019-05-29 RX ORDER — LISINOPRIL 20 MG/1
TABLET ORAL
Qty: 90 TABLET | Refills: 3 | Status: SHIPPED | OUTPATIENT
Start: 2019-05-29 | End: 2019-09-10 | Stop reason: SDUPTHER

## 2019-09-10 ENCOUNTER — OFFICE VISIT (OUTPATIENT)
Dept: PULMONOLOGY | Age: 59
End: 2019-09-10
Payer: COMMERCIAL

## 2019-09-10 VITALS
WEIGHT: 236 LBS | DIASTOLIC BLOOD PRESSURE: 84 MMHG | HEART RATE: 58 BPM | SYSTOLIC BLOOD PRESSURE: 128 MMHG | HEIGHT: 70 IN | BODY MASS INDEX: 33.79 KG/M2 | OXYGEN SATURATION: 97 %

## 2019-09-10 DIAGNOSIS — G47.33 OBSTRUCTIVE SLEEP APNEA ON CPAP: Primary | ICD-10-CM

## 2019-09-10 DIAGNOSIS — Z99.89 OBSTRUCTIVE SLEEP APNEA ON CPAP: Primary | ICD-10-CM

## 2019-09-10 DIAGNOSIS — E66.9 OBESITY (BMI 30-39.9): ICD-10-CM

## 2019-09-10 PROCEDURE — 1036F TOBACCO NON-USER: CPT | Performed by: PHYSICIAN ASSISTANT

## 2019-09-10 PROCEDURE — G8417 CALC BMI ABV UP PARAM F/U: HCPCS | Performed by: PHYSICIAN ASSISTANT

## 2019-09-10 PROCEDURE — 99213 OFFICE O/P EST LOW 20 MIN: CPT | Performed by: PHYSICIAN ASSISTANT

## 2019-09-10 PROCEDURE — 3017F COLORECTAL CA SCREEN DOC REV: CPT | Performed by: PHYSICIAN ASSISTANT

## 2019-09-10 PROCEDURE — G8427 DOCREV CUR MEDS BY ELIG CLIN: HCPCS | Performed by: PHYSICIAN ASSISTANT

## 2019-09-10 ASSESSMENT — ENCOUNTER SYMPTOMS
COUGH: 0
EYES NEGATIVE: 1
ALLERGIC/IMMUNOLOGIC NEGATIVE: 1
DIARRHEA: 0
WHEEZING: 0
SHORTNESS OF BREATH: 0
CHEST TIGHTNESS: 0
BACK PAIN: 0
STRIDOR: 0
NAUSEA: 0

## 2019-09-19 ENCOUNTER — TELEPHONE (OUTPATIENT)
Dept: FAMILY MEDICINE CLINIC | Age: 59
End: 2019-09-19

## 2019-09-19 DIAGNOSIS — N30.01 ACUTE CYSTITIS WITH HEMATURIA: Primary | ICD-10-CM

## 2019-09-20 DIAGNOSIS — R31.0 GROSS HEMATURIA: Primary | ICD-10-CM

## 2019-09-20 LAB
ABSOLUTE BASO #: 0 X10E9/L (ref 0–0.9)
ABSOLUTE EOS #: 0.1 X10E9/L (ref 0–0.4)
ABSOLUTE LYMPH #: 1.6 X10E9/L (ref 1–3.5)
ABSOLUTE MONO #: 0.5 X10E9/L (ref 0–0.9)
ABSOLUTE NEUT #: 1.8 X10E9/L (ref 1.5–6.6)
APPEARANCE: CLEAR
BASOPHILS RELATIVE PERCENT: 0.9 %
BILIRUBIN: NEGATIVE
COLOR: YELLOW
EOSINOPHILS RELATIVE PERCENT: 2.2 %
GLUCOSE BLD-MCNC: NEGATIVE MG/DL
HCT VFR BLD CALC: 42.6 % (ref 39–49)
HEMOGLOBIN: 14.5 G/DL (ref 13.1–17.3)
KETONES, URINE: NEGATIVE MG/DL
LEUKOCYTE ESTERASE, URINE: NEGATIVE
LYMPHOCYTE %: 39.9 %
MCH RBC QN AUTO: 34.4 PG (ref 27–35)
MCHC RBC AUTO-ENTMCNC: 34.1 G/DL (ref 32–36)
MCV RBC AUTO: 101 FL (ref 81–101)
MONOCYTES # BLD: 11.6 %
NEUTROPHILS RELATIVE PERCENT: 45.4 %
NITRITE, URINE: NEGATIVE
OCCULT BLOOD,URINE: ABNORMAL
PDW BLD-RTO: 12.2 % (ref 11.4–14.3)
PH: 6.5 (ref 5–8.5)
PLATELETS: 197 X10E9/L (ref 150–450)
PMV BLD AUTO: 9.3 FL (ref 7–12)
PROTEIN, URINE: NEGATIVE MG/DL
RBC: 4 /HPF (ref 0–5)
RBC: 4.22 X10E12/L (ref 4.25–5.65)
SITE/TYPE: ABNORMAL
SP GRAVITY MISCELLANEOUS: 1.01 (ref 1–1.03)
UROBILINOGEN, URINE: 0.2 EU/DL
WBC: 1 /HPF (ref 0–5)
WBC: 4 X10E9/L (ref 4.8–10.8)

## 2019-09-21 LAB
ANION GAP SERPL CALCULATED.3IONS-SCNC: 11 MMOL/L (ref 4–12)
BUN BLDV-MCNC: 21 MG/DL (ref 5–23)
CALCIUM SERPL-MCNC: 9.9 MG/DL (ref 8.5–10.5)
CHLORIDE BLD-SCNC: 105 MMOL/L (ref 98–109)
CO2: 25 MMOL/L (ref 22–32)
CREAT SERPL-MCNC: 1.08 MG/DL (ref 0.6–1.3)
EGFR AFRICAN AMERICAN: >60 ML/MIN/1.73SQ.M
EGFR IF NONAFRICAN AMERICAN: >60 ML/MIN/1.73SQ.M
GLUCOSE: 105 MG/DL (ref 65–99)
POTASSIUM SERPL-SCNC: 5.1 MMOL/L (ref 3.5–5)
PSA, ULTRASENSITIVE: 4.92 NG/ML (ref 0–4)
SODIUM BLD-SCNC: 141 MMOL/L (ref 134–146)

## 2019-09-24 ENCOUNTER — OFFICE VISIT (OUTPATIENT)
Dept: FAMILY MEDICINE CLINIC | Age: 59
End: 2019-09-24
Payer: COMMERCIAL

## 2019-09-24 VITALS
SYSTOLIC BLOOD PRESSURE: 132 MMHG | RESPIRATION RATE: 13 BRPM | OXYGEN SATURATION: 98 % | HEART RATE: 59 BPM | WEIGHT: 238.3 LBS | BODY MASS INDEX: 34.19 KG/M2 | DIASTOLIC BLOOD PRESSURE: 72 MMHG

## 2019-09-24 DIAGNOSIS — M48.02 CERVICAL STENOSIS OF SPINAL CANAL: ICD-10-CM

## 2019-09-24 DIAGNOSIS — M48.061 SPINAL STENOSIS OF LUMBAR REGION WITHOUT NEUROGENIC CLAUDICATION: ICD-10-CM

## 2019-09-24 DIAGNOSIS — E78.00 HYPERCHOLESTEROLEMIA: ICD-10-CM

## 2019-09-24 DIAGNOSIS — I10 ESSENTIAL HYPERTENSION: Primary | ICD-10-CM

## 2019-09-24 DIAGNOSIS — M54.31 SCIATICA OF RIGHT SIDE: ICD-10-CM

## 2019-09-24 DIAGNOSIS — R73.01 IFG (IMPAIRED FASTING GLUCOSE): ICD-10-CM

## 2019-09-24 DIAGNOSIS — Z51.81 MEDICATION MONITORING ENCOUNTER: ICD-10-CM

## 2019-09-24 LAB — HBA1C MFR BLD: 5.1 %

## 2019-09-24 PROCEDURE — 83036 HEMOGLOBIN GLYCOSYLATED A1C: CPT | Performed by: FAMILY MEDICINE

## 2019-09-24 PROCEDURE — 1036F TOBACCO NON-USER: CPT | Performed by: FAMILY MEDICINE

## 2019-09-24 PROCEDURE — 99214 OFFICE O/P EST MOD 30 MIN: CPT | Performed by: FAMILY MEDICINE

## 2019-09-24 PROCEDURE — G8417 CALC BMI ABV UP PARAM F/U: HCPCS | Performed by: FAMILY MEDICINE

## 2019-09-24 PROCEDURE — G8427 DOCREV CUR MEDS BY ELIG CLIN: HCPCS | Performed by: FAMILY MEDICINE

## 2019-09-24 PROCEDURE — 3017F COLORECTAL CA SCREEN DOC REV: CPT | Performed by: FAMILY MEDICINE

## 2019-09-24 RX ORDER — AMPICILLIN TRIHYDRATE 250 MG
1 CAPSULE ORAL DAILY
Qty: 90 CAPSULE | Refills: 3 | Status: SHIPPED | OUTPATIENT
Start: 2019-09-24

## 2019-09-24 RX ORDER — LISINOPRIL 20 MG/1
TABLET ORAL
Qty: 90 TABLET | Refills: 3 | Status: SHIPPED | OUTPATIENT
Start: 2019-09-24 | End: 2020-06-15

## 2019-09-24 RX ORDER — IBUPROFEN 800 MG/1
TABLET ORAL
Qty: 360 TABLET | Refills: 3 | Status: SHIPPED | OUTPATIENT
Start: 2019-09-24 | End: 2019-12-05 | Stop reason: SDUPTHER

## 2019-09-24 ASSESSMENT — ENCOUNTER SYMPTOMS
ALLERGIC/IMMUNOLOGIC NEGATIVE: 1
BACK PAIN: 1
GASTROINTESTINAL NEGATIVE: 1
RESPIRATORY NEGATIVE: 1

## 2019-09-24 NOTE — PROGRESS NOTES
reliever. He continues to do his neck stretches routinely and that has helped him avoid cervical radicular discomfort. Both upper extremities \"are working well\". He continues to use red yeast rice 1 600 mg capsule daily and his cholesterol HDL ratio has remained below 5.1. His LDL to HDL ratio is below 3. He continues to want to avoid statin therapy if at all possible. Cholesterol, Total (mg/dL)   Date Value   03/09/2017 220     Cholesterol (mg/dL)   Date Value   03/22/2019 223 (H)     HDL (mg/dL)   Date Value   03/22/2019 60.6     Triglycerides (mg/dL)   Date Value   03/22/2019 73     Lab Results   Component Value Date    LDLCALC 148 (H) 03/22/2019     The rest of this patient's conditions are stable. Past medical and surgical hx reviewed. Past Medical History:   Diagnosis Date    Bursitis     Chest pain     Erectile dysfunction     Hyperlipidemia     Hypertension     Sleep apnea      Past Surgical History:   Procedure Laterality Date    CARDIAC CATHETERIZATION  6/30/2011    WISDOM TOOTH EXTRACTION       Portions of this note were completed with a voice recording program.  Efforts were made to edit the dictations but occasionally words are mis-transcribed. Review of Systems   Constitutional: Negative. HENT: Negative. Respiratory: Negative. Cardiovascular: Negative. Negative for leg swelling. Gastrointestinal: Negative. Genitourinary: Positive for hematuria. Musculoskeletal: Positive for arthralgias and back pain. Allergic/Immunologic: Negative. Neurological: Negative. Hematological: Negative. Psychiatric/Behavioral: Negative. All other systems reviewed and are negative. Objective:   Physical Exam   Constitutional: He is oriented to person, place, and time. He appears well-developed and well-nourished.    HENT:   Right Ear: External ear normal.   Left Ear: External ear normal.   Mouth/Throat: Oropharynx is clear and moist.   Eyes: Conjunctivae are normal.

## 2019-09-24 NOTE — PATIENT INSTRUCTIONS
You may receive a survey regarding the care you received during your visit. Your input is valuable to us. We encourage you to complete and return your survey. We hope you will choose us in the future for your healthcare needs. Patient Education        Start back care exercises. Use Aspercream with Lidocaine to joints as needed. Referral to PT is needed. Learning About How to Have a Healthy Back  What causes back pain? Back pain is often caused by overuse, strain, or injury. For example, people often hurt their backs playing sports or working in the yard, being jolted in a car accident, or lifting something too heavy. Aging plays a part too. Your bones and muscles tend to lose strength as you age, which makes injury more likely. The spongy discs between the bones of the spine (vertebrae) may suffer from wear and tear and no longer provide enough cushion between the bones. A disc that bulges or breaks open (herniated disc) can press on nerves, causing back pain. In some people, back pain is the result of arthritis, broken vertebrae caused by bone loss (osteoporosis), illness, or a spine problem. Although most people have back pain at one time or another, there are steps you can take to make it less likely. How can you have a healthy back? Reduce stress on your back through good posture  Slumping or slouching alone may not cause low back pain. But after the back has been strained or injured, bad posture can make pain worse. · Sleep in a position that maintains your back's normal curves and on a mattress that feels comfortable. Sleep on your side with a pillow between your knees, or sleep on your back with a pillow under your knees. These positions can reduce strain on your back. · Stand and sit up straight. \"Good posture\" generally means your ears, shoulders, and hips are in a straight line. · If you must stand for a long time, put one foot on a stool, ledge, or box.  Switch feet every now and

## 2019-09-27 ENCOUNTER — TELEPHONE (OUTPATIENT)
Dept: FAMILY MEDICINE CLINIC | Age: 59
End: 2019-09-27

## 2019-09-27 ENCOUNTER — HOSPITAL ENCOUNTER (OUTPATIENT)
Dept: CT IMAGING | Age: 59
Discharge: HOME | End: 2019-09-27
Payer: COMMERCIAL

## 2019-09-27 DIAGNOSIS — R31.0 GROSS HEMATURIA: ICD-10-CM

## 2019-09-27 PROCEDURE — 74178 CT ABD&PLV WO CNTR FLWD CNTR: CPT

## 2019-09-27 PROCEDURE — 6360000004 HC RX CONTRAST MEDICATION: Performed by: FAMILY MEDICINE

## 2019-09-27 RX ADMIN — IOPAMIDOL 85 ML: 755 INJECTION, SOLUTION INTRAVENOUS at 09:28

## 2019-10-01 DIAGNOSIS — N32.9 LESION OF BLADDER: Primary | ICD-10-CM

## 2019-10-10 ENCOUNTER — OFFICE VISIT (OUTPATIENT)
Dept: UROLOGY | Age: 59
End: 2019-10-10
Payer: COMMERCIAL

## 2019-10-10 ENCOUNTER — HOSPITAL ENCOUNTER (OUTPATIENT)
Age: 59
Discharge: HOME | End: 2019-10-10
Payer: COMMERCIAL

## 2019-10-10 ENCOUNTER — HOSPITAL ENCOUNTER (OUTPATIENT)
Dept: GENERAL RADIOLOGY | Age: 59
Discharge: HOME | End: 2019-10-10
Payer: COMMERCIAL

## 2019-10-10 ENCOUNTER — TELEPHONE (OUTPATIENT)
Dept: UROLOGY | Age: 59
End: 2019-10-10

## 2019-10-10 VITALS
DIASTOLIC BLOOD PRESSURE: 82 MMHG | WEIGHT: 242.9 LBS | HEIGHT: 70 IN | SYSTOLIC BLOOD PRESSURE: 134 MMHG | BODY MASS INDEX: 34.77 KG/M2

## 2019-10-10 DIAGNOSIS — R35.0 URINARY FREQUENCY: ICD-10-CM

## 2019-10-10 DIAGNOSIS — R31.0 GROSS HEMATURIA: ICD-10-CM

## 2019-10-10 DIAGNOSIS — Z01.818 PRE-OP TESTING: ICD-10-CM

## 2019-10-10 DIAGNOSIS — N32.89 BLADDER WALL THICKENING: Primary | ICD-10-CM

## 2019-10-10 DIAGNOSIS — N32.89 BLADDER WALL THICKENING: ICD-10-CM

## 2019-10-10 LAB
BILIRUBIN URINE: NEGATIVE
BLOOD URINE, POC: NEGATIVE
CHARACTER, URINE: CLEAR
COLOR, URINE: YELLOW
EKG ATRIAL RATE: 57 BPM
EKG P AXIS: 11 DEGREES
EKG P-R INTERVAL: 148 MS
EKG Q-T INTERVAL: 408 MS
EKG QRS DURATION: 102 MS
EKG QTC CALCULATION (BAZETT): 397 MS
EKG R AXIS: 3 DEGREES
EKG T AXIS: 33 DEGREES
EKG VENTRICULAR RATE: 57 BPM
GLUCOSE URINE: NEGATIVE MG/DL
KETONES, URINE: NEGATIVE
LEUKOCYTE CLUMPS, URINE: NEGATIVE
NITRITE, URINE: NEGATIVE
PH, URINE: 6 (ref 5–9)
POST VOID RESIDUAL (PVR): 100 ML
PROTEIN, URINE: NEGATIVE MG/DL
SPECIFIC GRAVITY, URINE: <= 1.005 (ref 1–1.03)
UROBILINOGEN, URINE: 0.2 EU/DL (ref 0–1)

## 2019-10-10 PROCEDURE — 51798 US URINE CAPACITY MEASURE: CPT | Performed by: UROLOGY

## 2019-10-10 PROCEDURE — 71046 X-RAY EXAM CHEST 2 VIEWS: CPT

## 2019-10-10 PROCEDURE — 99204 OFFICE O/P NEW MOD 45 MIN: CPT | Performed by: UROLOGY

## 2019-10-10 PROCEDURE — G8484 FLU IMMUNIZE NO ADMIN: HCPCS | Performed by: UROLOGY

## 2019-10-10 PROCEDURE — 3017F COLORECTAL CA SCREEN DOC REV: CPT | Performed by: UROLOGY

## 2019-10-10 PROCEDURE — 93010 ELECTROCARDIOGRAM REPORT: CPT | Performed by: NUCLEAR MEDICINE

## 2019-10-10 PROCEDURE — 93005 ELECTROCARDIOGRAM TRACING: CPT | Performed by: UROLOGY

## 2019-10-10 PROCEDURE — G8427 DOCREV CUR MEDS BY ELIG CLIN: HCPCS | Performed by: UROLOGY

## 2019-10-10 PROCEDURE — G8417 CALC BMI ABV UP PARAM F/U: HCPCS | Performed by: UROLOGY

## 2019-10-10 PROCEDURE — 1036F TOBACCO NON-USER: CPT | Performed by: UROLOGY

## 2019-10-10 PROCEDURE — 81003 URINALYSIS AUTO W/O SCOPE: CPT | Performed by: UROLOGY

## 2019-10-12 LAB — URINE CULTURE, ROUTINE: NORMAL

## 2019-10-16 ENCOUNTER — PREP FOR PROCEDURE (OUTPATIENT)
Dept: UROLOGY | Age: 59
End: 2019-10-16

## 2019-10-16 RX ORDER — SODIUM CHLORIDE 9 MG/ML
INJECTION, SOLUTION INTRAVENOUS CONTINUOUS
Status: CANCELLED | OUTPATIENT
Start: 2019-11-07

## 2019-10-17 ENCOUNTER — TELEPHONE (OUTPATIENT)
Dept: UROLOGY | Age: 59
End: 2019-10-17

## 2019-11-05 LAB
BILIRUBIN URINE: NORMAL MG/DL
BLOOD, URINE: NEGATIVE
CLARITY: CLEAR
COLOR: YELLOW
GLUCOSE URINE: NEGATIVE
KETONES, URINE: NEGATIVE
LEUKOCYTE ESTERASE, URINE: NEGATIVE
NITRITE, URINE: NEGATIVE
PH UA: 6.5 (ref 4.5–8)
PROTEIN UA: NEGATIVE
SPECIFIC GRAVITY UA: 1.01 (ref 1–1.03)
UROBILINOGEN, URINE: NORMAL

## 2019-11-07 ENCOUNTER — ANESTHESIA (OUTPATIENT)
Dept: OPERATING ROOM | Age: 59
End: 2019-11-07
Payer: COMMERCIAL

## 2019-11-07 ENCOUNTER — ANESTHESIA EVENT (OUTPATIENT)
Dept: OPERATING ROOM | Age: 59
End: 2019-11-07
Payer: COMMERCIAL

## 2019-11-07 ENCOUNTER — TELEPHONE (OUTPATIENT)
Dept: UROLOGY | Age: 59
End: 2019-11-07

## 2019-11-07 ENCOUNTER — HOSPITAL ENCOUNTER (OUTPATIENT)
Age: 59
Setting detail: OUTPATIENT SURGERY
Discharge: HOME | End: 2019-11-07
Attending: UROLOGY | Admitting: UROLOGY
Payer: COMMERCIAL

## 2019-11-07 VITALS
HEIGHT: 70 IN | HEART RATE: 56 BPM | RESPIRATION RATE: 12 BRPM | DIASTOLIC BLOOD PRESSURE: 79 MMHG | BODY MASS INDEX: 33.3 KG/M2 | SYSTOLIC BLOOD PRESSURE: 163 MMHG | OXYGEN SATURATION: 96 % | WEIGHT: 232.6 LBS | TEMPERATURE: 97.2 F

## 2019-11-07 VITALS
DIASTOLIC BLOOD PRESSURE: 63 MMHG | OXYGEN SATURATION: 100 % | RESPIRATION RATE: 3 BRPM | SYSTOLIC BLOOD PRESSURE: 107 MMHG

## 2019-11-07 LAB — POTASSIUM SERPL-SCNC: 4.9 MEQ/L (ref 3.5–5.2)

## 2019-11-07 PROCEDURE — 3700000000 HC ANESTHESIA ATTENDED CARE: Performed by: UROLOGY

## 2019-11-07 PROCEDURE — 6360000002 HC RX W HCPCS: Performed by: UROLOGY

## 2019-11-07 PROCEDURE — 2720000010 HC SURG SUPPLY STERILE: Performed by: UROLOGY

## 2019-11-07 PROCEDURE — 7100000001 HC PACU RECOVERY - ADDTL 15 MIN: Performed by: UROLOGY

## 2019-11-07 PROCEDURE — 2709999900 HC NON-CHARGEABLE SUPPLY: Performed by: UROLOGY

## 2019-11-07 PROCEDURE — 7100000011 HC PHASE II RECOVERY - ADDTL 15 MIN: Performed by: UROLOGY

## 2019-11-07 PROCEDURE — 7100000000 HC PACU RECOVERY - FIRST 15 MIN: Performed by: UROLOGY

## 2019-11-07 PROCEDURE — 88305 TISSUE EXAM BY PATHOLOGIST: CPT

## 2019-11-07 PROCEDURE — 7100000010 HC PHASE II RECOVERY - FIRST 15 MIN: Performed by: UROLOGY

## 2019-11-07 PROCEDURE — 2500000003 HC RX 250 WO HCPCS: Performed by: NURSE ANESTHETIST, CERTIFIED REGISTERED

## 2019-11-07 PROCEDURE — 84132 ASSAY OF SERUM POTASSIUM: CPT

## 2019-11-07 PROCEDURE — 2580000003 HC RX 258: Performed by: UROLOGY

## 2019-11-07 PROCEDURE — 6360000002 HC RX W HCPCS: Performed by: NURSE ANESTHETIST, CERTIFIED REGISTERED

## 2019-11-07 PROCEDURE — 3600000014 HC SURGERY LEVEL 4 ADDTL 15MIN: Performed by: UROLOGY

## 2019-11-07 PROCEDURE — 3700000001 HC ADD 15 MINUTES (ANESTHESIA): Performed by: UROLOGY

## 2019-11-07 PROCEDURE — 3600000004 HC SURGERY LEVEL 4 BASE: Performed by: UROLOGY

## 2019-11-07 PROCEDURE — 36415 COLL VENOUS BLD VENIPUNCTURE: CPT

## 2019-11-07 RX ORDER — DEXAMETHASONE SODIUM PHOSPHATE 4 MG/ML
INJECTION, SOLUTION INTRA-ARTICULAR; INTRALESIONAL; INTRAMUSCULAR; INTRAVENOUS; SOFT TISSUE PRN
Status: DISCONTINUED | OUTPATIENT
Start: 2019-11-07 | End: 2019-11-07 | Stop reason: SDUPTHER

## 2019-11-07 RX ORDER — FENTANYL CITRATE 50 UG/ML
50 INJECTION, SOLUTION INTRAMUSCULAR; INTRAVENOUS EVERY 5 MIN PRN
Status: DISCONTINUED | OUTPATIENT
Start: 2019-11-07 | End: 2019-11-07 | Stop reason: HOSPADM

## 2019-11-07 RX ORDER — LIDOCAINE HCL/PF 100 MG/5ML
SYRINGE (ML) INJECTION PRN
Status: DISCONTINUED | OUTPATIENT
Start: 2019-11-07 | End: 2019-11-07 | Stop reason: SDUPTHER

## 2019-11-07 RX ORDER — FENTANYL CITRATE 50 UG/ML
INJECTION, SOLUTION INTRAMUSCULAR; INTRAVENOUS PRN
Status: DISCONTINUED | OUTPATIENT
Start: 2019-11-07 | End: 2019-11-07 | Stop reason: SDUPTHER

## 2019-11-07 RX ORDER — DIPHENHYDRAMINE HYDROCHLORIDE 50 MG/ML
12.5 INJECTION INTRAMUSCULAR; INTRAVENOUS
Status: DISCONTINUED | OUTPATIENT
Start: 2019-11-07 | End: 2019-11-07 | Stop reason: HOSPADM

## 2019-11-07 RX ORDER — ONDANSETRON 2 MG/ML
INJECTION INTRAMUSCULAR; INTRAVENOUS PRN
Status: DISCONTINUED | OUTPATIENT
Start: 2019-11-07 | End: 2019-11-07 | Stop reason: SDUPTHER

## 2019-11-07 RX ORDER — ONDANSETRON 2 MG/ML
4 INJECTION INTRAMUSCULAR; INTRAVENOUS
Status: DISCONTINUED | OUTPATIENT
Start: 2019-11-07 | End: 2019-11-07 | Stop reason: HOSPADM

## 2019-11-07 RX ORDER — MORPHINE SULFATE 2 MG/ML
2 INJECTION, SOLUTION INTRAMUSCULAR; INTRAVENOUS EVERY 5 MIN PRN
Status: DISCONTINUED | OUTPATIENT
Start: 2019-11-07 | End: 2019-11-07 | Stop reason: HOSPADM

## 2019-11-07 RX ORDER — PROPOFOL 10 MG/ML
INJECTION, EMULSION INTRAVENOUS PRN
Status: DISCONTINUED | OUTPATIENT
Start: 2019-11-07 | End: 2019-11-07 | Stop reason: SDUPTHER

## 2019-11-07 RX ORDER — HYDRALAZINE HYDROCHLORIDE 20 MG/ML
5 INJECTION INTRAMUSCULAR; INTRAVENOUS EVERY 10 MIN PRN
Status: DISCONTINUED | OUTPATIENT
Start: 2019-11-07 | End: 2019-11-07 | Stop reason: HOSPADM

## 2019-11-07 RX ORDER — MEPERIDINE HYDROCHLORIDE 25 MG/ML
12.5 INJECTION INTRAMUSCULAR; INTRAVENOUS; SUBCUTANEOUS EVERY 5 MIN PRN
Status: DISCONTINUED | OUTPATIENT
Start: 2019-11-07 | End: 2019-11-07 | Stop reason: HOSPADM

## 2019-11-07 RX ORDER — SODIUM CHLORIDE 9 MG/ML
INJECTION, SOLUTION INTRAVENOUS CONTINUOUS
Status: DISCONTINUED | OUTPATIENT
Start: 2019-11-07 | End: 2019-11-07 | Stop reason: HOSPADM

## 2019-11-07 RX ADMIN — SODIUM CHLORIDE: 9 INJECTION, SOLUTION INTRAVENOUS at 07:22

## 2019-11-07 RX ADMIN — Medication 2 G: at 07:30

## 2019-11-07 RX ADMIN — FENTANYL CITRATE 100 MCG: 50 INJECTION INTRAMUSCULAR; INTRAVENOUS at 07:26

## 2019-11-07 RX ADMIN — Medication 100 MG: at 07:22

## 2019-11-07 RX ADMIN — SODIUM CHLORIDE: 9 INJECTION, SOLUTION INTRAVENOUS at 06:22

## 2019-11-07 RX ADMIN — PROPOFOL 200 MG: 10 INJECTION, EMULSION INTRAVENOUS at 07:22

## 2019-11-07 RX ADMIN — ONDANSETRON HYDROCHLORIDE 4 MG: 4 INJECTION, SOLUTION INTRAMUSCULAR; INTRAVENOUS at 07:30

## 2019-11-07 RX ADMIN — DEXAMETHASONE SODIUM PHOSPHATE 8 MG: 4 INJECTION, SOLUTION INTRAMUSCULAR; INTRAVENOUS at 07:30

## 2019-11-07 ASSESSMENT — PAIN DESCRIPTION - FREQUENCY: FREQUENCY: CONTINUOUS

## 2019-11-07 ASSESSMENT — PAIN SCALES - GENERAL
PAINLEVEL_OUTOF10: 0
PAINLEVEL_OUTOF10: 5
PAINLEVEL_OUTOF10: 0
PAINLEVEL_OUTOF10: 5
PAINLEVEL_OUTOF10: 5
PAINLEVEL_OUTOF10: 4

## 2019-11-07 ASSESSMENT — PULMONARY FUNCTION TESTS
PIF_VALUE: 13
PIF_VALUE: 6
PIF_VALUE: 15
PIF_VALUE: 20
PIF_VALUE: 13
PIF_VALUE: 1
PIF_VALUE: 13
PIF_VALUE: 15
PIF_VALUE: 13
PIF_VALUE: 23
PIF_VALUE: 15
PIF_VALUE: 3
PIF_VALUE: 0
PIF_VALUE: 15
PIF_VALUE: 13
PIF_VALUE: 22
PIF_VALUE: 3
PIF_VALUE: 24
PIF_VALUE: 13
PIF_VALUE: 15
PIF_VALUE: 13
PIF_VALUE: 6

## 2019-11-07 ASSESSMENT — PAIN DESCRIPTION - DESCRIPTORS
DESCRIPTORS: ACHING
DESCRIPTORS: ACHING

## 2019-11-07 ASSESSMENT — PAIN DESCRIPTION - LOCATION
LOCATION: PENIS
LOCATION: PENIS

## 2019-11-07 ASSESSMENT — PAIN DESCRIPTION - PAIN TYPE
TYPE: SURGICAL PAIN
TYPE: SURGICAL PAIN

## 2019-11-07 ASSESSMENT — PAIN - FUNCTIONAL ASSESSMENT: PAIN_FUNCTIONAL_ASSESSMENT: 0-10

## 2019-11-14 ENCOUNTER — TELEPHONE (OUTPATIENT)
Dept: UROLOGY | Age: 59
End: 2019-11-14

## 2019-11-14 ENCOUNTER — OFFICE VISIT (OUTPATIENT)
Dept: UROLOGY | Age: 59
End: 2019-11-14
Payer: COMMERCIAL

## 2019-11-14 VITALS
WEIGHT: 240 LBS | SYSTOLIC BLOOD PRESSURE: 140 MMHG | BODY MASS INDEX: 34.36 KG/M2 | HEIGHT: 70 IN | DIASTOLIC BLOOD PRESSURE: 76 MMHG

## 2019-11-14 DIAGNOSIS — R31.0 GROSS HEMATURIA: ICD-10-CM

## 2019-11-14 DIAGNOSIS — C67.2 MALIGNANT NEOPLASM OF LATERAL WALL OF URINARY BLADDER (HCC): Primary | ICD-10-CM

## 2019-11-14 DIAGNOSIS — R35.0 URINARY FREQUENCY: ICD-10-CM

## 2019-11-14 LAB
BILIRUBIN URINE: NEGATIVE
BLOOD URINE, POC: ABNORMAL
CHARACTER, URINE: CLEAR
COLOR, URINE: YELLOW
GLUCOSE URINE: NEGATIVE MG/DL
KETONES, URINE: NEGATIVE
LEUKOCYTE CLUMPS, URINE: ABNORMAL
NITRITE, URINE: NEGATIVE
PH, URINE: 7 (ref 5–9)
PROTEIN, URINE: NEGATIVE MG/DL
SPECIFIC GRAVITY, URINE: 1.01 (ref 1–1.03)
UROBILINOGEN, URINE: 0.2 EU/DL (ref 0–1)

## 2019-11-14 PROCEDURE — G8484 FLU IMMUNIZE NO ADMIN: HCPCS | Performed by: UROLOGY

## 2019-11-14 PROCEDURE — 3017F COLORECTAL CA SCREEN DOC REV: CPT | Performed by: UROLOGY

## 2019-11-14 PROCEDURE — G8417 CALC BMI ABV UP PARAM F/U: HCPCS | Performed by: UROLOGY

## 2019-11-14 PROCEDURE — 99214 OFFICE O/P EST MOD 30 MIN: CPT | Performed by: UROLOGY

## 2019-11-14 PROCEDURE — G8427 DOCREV CUR MEDS BY ELIG CLIN: HCPCS | Performed by: UROLOGY

## 2019-11-14 PROCEDURE — 1036F TOBACCO NON-USER: CPT | Performed by: UROLOGY

## 2019-11-14 PROCEDURE — 81003 URINALYSIS AUTO W/O SCOPE: CPT | Performed by: UROLOGY

## 2019-11-15 ENCOUNTER — TELEPHONE (OUTPATIENT)
Dept: UROLOGY | Age: 59
End: 2019-11-15

## 2019-11-15 ENCOUNTER — PREP FOR PROCEDURE (OUTPATIENT)
Dept: UROLOGY | Age: 59
End: 2019-11-15

## 2019-11-15 RX ORDER — SODIUM CHLORIDE 9 MG/ML
INJECTION, SOLUTION INTRAVENOUS CONTINUOUS
Status: CANCELLED | OUTPATIENT
Start: 2019-12-05

## 2019-12-04 ENCOUNTER — ANESTHESIA EVENT (OUTPATIENT)
Dept: OPERATING ROOM | Age: 59
End: 2019-12-04
Payer: COMMERCIAL

## 2019-12-05 ENCOUNTER — HOSPITAL ENCOUNTER (OUTPATIENT)
Age: 59
Setting detail: OUTPATIENT SURGERY
Discharge: HOME | End: 2019-12-05
Attending: UROLOGY | Admitting: UROLOGY
Payer: COMMERCIAL

## 2019-12-05 ENCOUNTER — ANESTHESIA (OUTPATIENT)
Dept: OPERATING ROOM | Age: 59
End: 2019-12-05
Payer: COMMERCIAL

## 2019-12-05 VITALS
RESPIRATION RATE: 1 BRPM | SYSTOLIC BLOOD PRESSURE: 118 MMHG | DIASTOLIC BLOOD PRESSURE: 67 MMHG | OXYGEN SATURATION: 98 %

## 2019-12-05 VITALS
HEART RATE: 57 BPM | TEMPERATURE: 97.9 F | HEIGHT: 70 IN | DIASTOLIC BLOOD PRESSURE: 74 MMHG | RESPIRATION RATE: 18 BRPM | WEIGHT: 238.2 LBS | OXYGEN SATURATION: 98 % | BODY MASS INDEX: 34.1 KG/M2 | SYSTOLIC BLOOD PRESSURE: 154 MMHG

## 2019-12-05 DIAGNOSIS — M48.02 CERVICAL STENOSIS OF SPINAL CANAL: ICD-10-CM

## 2019-12-05 DIAGNOSIS — M54.31 SCIATICA OF RIGHT SIDE: ICD-10-CM

## 2019-12-05 PROCEDURE — 7100000001 HC PACU RECOVERY - ADDTL 15 MIN: Performed by: UROLOGY

## 2019-12-05 PROCEDURE — 7100000011 HC PHASE II RECOVERY - ADDTL 15 MIN: Performed by: UROLOGY

## 2019-12-05 PROCEDURE — 3600000013 HC SURGERY LEVEL 3 ADDTL 15MIN: Performed by: UROLOGY

## 2019-12-05 PROCEDURE — 2580000003 HC RX 258

## 2019-12-05 PROCEDURE — 3600000003 HC SURGERY LEVEL 3 BASE: Performed by: UROLOGY

## 2019-12-05 PROCEDURE — 2709999900 HC NON-CHARGEABLE SUPPLY: Performed by: UROLOGY

## 2019-12-05 PROCEDURE — 3700000000 HC ANESTHESIA ATTENDED CARE: Performed by: UROLOGY

## 2019-12-05 PROCEDURE — 6360000002 HC RX W HCPCS: Performed by: REGISTERED NURSE

## 2019-12-05 PROCEDURE — 88305 TISSUE EXAM BY PATHOLOGIST: CPT

## 2019-12-05 PROCEDURE — 7100000000 HC PACU RECOVERY - FIRST 15 MIN: Performed by: UROLOGY

## 2019-12-05 PROCEDURE — 6360000002 HC RX W HCPCS: Performed by: UROLOGY

## 2019-12-05 PROCEDURE — 6360000002 HC RX W HCPCS

## 2019-12-05 PROCEDURE — 3700000001 HC ADD 15 MINUTES (ANESTHESIA): Performed by: UROLOGY

## 2019-12-05 PROCEDURE — 2500000003 HC RX 250 WO HCPCS

## 2019-12-05 PROCEDURE — 7100000010 HC PHASE II RECOVERY - FIRST 15 MIN: Performed by: UROLOGY

## 2019-12-05 RX ORDER — PROPOFOL 10 MG/ML
INJECTION, EMULSION INTRAVENOUS PRN
Status: DISCONTINUED | OUTPATIENT
Start: 2019-12-05 | End: 2019-12-05 | Stop reason: SDUPTHER

## 2019-12-05 RX ORDER — ONDANSETRON 2 MG/ML
INJECTION INTRAMUSCULAR; INTRAVENOUS PRN
Status: DISCONTINUED | OUTPATIENT
Start: 2019-12-05 | End: 2019-12-05 | Stop reason: SDUPTHER

## 2019-12-05 RX ORDER — CIPROFLOXACIN 2 MG/ML
400 INJECTION, SOLUTION INTRAVENOUS
Status: COMPLETED | OUTPATIENT
Start: 2019-12-05 | End: 2019-12-05

## 2019-12-05 RX ORDER — FENTANYL CITRATE 50 UG/ML
INJECTION, SOLUTION INTRAMUSCULAR; INTRAVENOUS PRN
Status: DISCONTINUED | OUTPATIENT
Start: 2019-12-05 | End: 2019-12-05 | Stop reason: SDUPTHER

## 2019-12-05 RX ORDER — KETOROLAC TROMETHAMINE 30 MG/ML
INJECTION, SOLUTION INTRAMUSCULAR; INTRAVENOUS PRN
Status: DISCONTINUED | OUTPATIENT
Start: 2019-12-05 | End: 2019-12-05 | Stop reason: SDUPTHER

## 2019-12-05 RX ORDER — SODIUM CHLORIDE 0.9 % (FLUSH) 0.9 %
10 SYRINGE (ML) INJECTION PRN
Status: DISCONTINUED | OUTPATIENT
Start: 2019-12-05 | End: 2019-12-05 | Stop reason: HOSPADM

## 2019-12-05 RX ORDER — LIDOCAINE HCL/PF 100 MG/5ML
SYRINGE (ML) INJECTION PRN
Status: DISCONTINUED | OUTPATIENT
Start: 2019-12-05 | End: 2019-12-05 | Stop reason: SDUPTHER

## 2019-12-05 RX ORDER — SODIUM CHLORIDE 9 MG/ML
INJECTION, SOLUTION INTRAVENOUS CONTINUOUS
Status: DISCONTINUED | OUTPATIENT
Start: 2019-12-05 | End: 2019-12-05 | Stop reason: HOSPADM

## 2019-12-05 RX ORDER — DEXAMETHASONE SODIUM PHOSPHATE 4 MG/ML
INJECTION, SOLUTION INTRA-ARTICULAR; INTRALESIONAL; INTRAMUSCULAR; INTRAVENOUS; SOFT TISSUE PRN
Status: DISCONTINUED | OUTPATIENT
Start: 2019-12-05 | End: 2019-12-05 | Stop reason: SDUPTHER

## 2019-12-05 RX ORDER — SODIUM CHLORIDE 0.9 % (FLUSH) 0.9 %
10 SYRINGE (ML) INJECTION EVERY 12 HOURS SCHEDULED
Status: DISCONTINUED | OUTPATIENT
Start: 2019-12-05 | End: 2019-12-05 | Stop reason: HOSPADM

## 2019-12-05 RX ADMIN — FENTANYL CITRATE 50 MCG: 50 INJECTION INTRAMUSCULAR; INTRAVENOUS at 07:27

## 2019-12-05 RX ADMIN — CIPROFLOXACIN 400 MG: 2 INJECTION, SOLUTION INTRAVENOUS at 07:27

## 2019-12-05 RX ADMIN — KETOROLAC TROMETHAMINE 30 MG: 30 INJECTION, SOLUTION INTRAMUSCULAR; INTRAVENOUS at 08:04

## 2019-12-05 RX ADMIN — Medication 100 MG: at 07:33

## 2019-12-05 RX ADMIN — FENTANYL CITRATE 50 MCG: 50 INJECTION INTRAMUSCULAR; INTRAVENOUS at 07:33

## 2019-12-05 RX ADMIN — PROPOFOL 200 MG: 10 INJECTION, EMULSION INTRAVENOUS at 07:33

## 2019-12-05 RX ADMIN — DEXAMETHASONE SODIUM PHOSPHATE 8 MG: 4 INJECTION, SOLUTION INTRAMUSCULAR; INTRAVENOUS at 07:39

## 2019-12-05 RX ADMIN — ONDANSETRON HYDROCHLORIDE 4 MG: 4 INJECTION, SOLUTION INTRAMUSCULAR; INTRAVENOUS at 07:39

## 2019-12-05 RX ADMIN — SODIUM CHLORIDE: 9 INJECTION, SOLUTION INTRAVENOUS at 07:00

## 2019-12-05 RX ADMIN — PROPOFOL 30 MG: 10 INJECTION, EMULSION INTRAVENOUS at 07:52

## 2019-12-05 RX ADMIN — SODIUM CHLORIDE: 9 INJECTION, SOLUTION INTRAVENOUS at 07:27

## 2019-12-05 ASSESSMENT — PULMONARY FUNCTION TESTS
PIF_VALUE: 10
PIF_VALUE: 3
PIF_VALUE: 14
PIF_VALUE: 16
PIF_VALUE: 1
PIF_VALUE: 14
PIF_VALUE: 4
PIF_VALUE: 14
PIF_VALUE: 4
PIF_VALUE: 4
PIF_VALUE: 3
PIF_VALUE: 3
PIF_VALUE: 14
PIF_VALUE: 5
PIF_VALUE: 9
PIF_VALUE: 3
PIF_VALUE: 14
PIF_VALUE: 4
PIF_VALUE: 2
PIF_VALUE: 5
PIF_VALUE: 0
PIF_VALUE: 14
PIF_VALUE: 1
PIF_VALUE: 4
PIF_VALUE: 3
PIF_VALUE: 14
PIF_VALUE: 1
PIF_VALUE: 1
PIF_VALUE: 3
PIF_VALUE: 26
PIF_VALUE: 4
PIF_VALUE: 9
PIF_VALUE: 3
PIF_VALUE: 13
PIF_VALUE: 14
PIF_VALUE: 9
PIF_VALUE: 26
PIF_VALUE: 0
PIF_VALUE: 14

## 2019-12-05 ASSESSMENT — PAIN SCALES - GENERAL
PAINLEVEL_OUTOF10: 4
PAINLEVEL_OUTOF10: 4
PAINLEVEL_OUTOF10: 0
PAINLEVEL_OUTOF10: 4

## 2019-12-06 RX ORDER — IBUPROFEN 800 MG/1
TABLET ORAL
Qty: 360 TABLET | Refills: 3 | Status: SHIPPED | OUTPATIENT
Start: 2019-12-06 | End: 2019-12-16 | Stop reason: SDUPTHER

## 2019-12-12 ENCOUNTER — OFFICE VISIT (OUTPATIENT)
Dept: UROLOGY | Age: 59
End: 2019-12-12
Payer: COMMERCIAL

## 2019-12-12 ENCOUNTER — TELEPHONE (OUTPATIENT)
Dept: UROLOGY | Age: 59
End: 2019-12-12

## 2019-12-12 VITALS
DIASTOLIC BLOOD PRESSURE: 60 MMHG | WEIGHT: 242 LBS | SYSTOLIC BLOOD PRESSURE: 120 MMHG | BODY MASS INDEX: 34.65 KG/M2 | HEIGHT: 70 IN

## 2019-12-12 DIAGNOSIS — R35.0 URINARY FREQUENCY: Primary | ICD-10-CM

## 2019-12-12 DIAGNOSIS — C67.2 MALIGNANT NEOPLASM OF LATERAL WALL OF URINARY BLADDER (HCC): ICD-10-CM

## 2019-12-12 DIAGNOSIS — N32.89 BLADDER WALL THICKENING: ICD-10-CM

## 2019-12-12 DIAGNOSIS — R31.0 GROSS HEMATURIA: ICD-10-CM

## 2019-12-12 LAB
BILIRUBIN URINE: NEGATIVE
BLOOD URINE, POC: ABNORMAL
CHARACTER, URINE: CLEAR
COLOR, URINE: YELLOW
GLUCOSE URINE: NEGATIVE MG/DL
KETONES, URINE: NEGATIVE
LEUKOCYTE CLUMPS, URINE: ABNORMAL
NITRITE, URINE: NEGATIVE
PH, URINE: 5.5 (ref 5–9)
PROTEIN, URINE: 30 MG/DL
SPECIFIC GRAVITY, URINE: 1.02 (ref 1–1.03)
UROBILINOGEN, URINE: 0.2 EU/DL (ref 0–1)

## 2019-12-12 PROCEDURE — 1036F TOBACCO NON-USER: CPT | Performed by: UROLOGY

## 2019-12-12 PROCEDURE — G8427 DOCREV CUR MEDS BY ELIG CLIN: HCPCS | Performed by: UROLOGY

## 2019-12-12 PROCEDURE — 99213 OFFICE O/P EST LOW 20 MIN: CPT | Performed by: UROLOGY

## 2019-12-12 PROCEDURE — G8484 FLU IMMUNIZE NO ADMIN: HCPCS | Performed by: UROLOGY

## 2019-12-12 PROCEDURE — 3017F COLORECTAL CA SCREEN DOC REV: CPT | Performed by: UROLOGY

## 2019-12-12 PROCEDURE — G8417 CALC BMI ABV UP PARAM F/U: HCPCS | Performed by: UROLOGY

## 2019-12-16 DIAGNOSIS — M48.02 CERVICAL STENOSIS OF SPINAL CANAL: ICD-10-CM

## 2019-12-16 DIAGNOSIS — F41.9 CHRONIC ANXIETY: ICD-10-CM

## 2019-12-16 DIAGNOSIS — M54.31 SCIATICA OF RIGHT SIDE: ICD-10-CM

## 2019-12-16 RX ORDER — CLONAZEPAM 0.5 MG/1
0.5 TABLET ORAL 2 TIMES DAILY PRN
Qty: 60 TABLET | Refills: 2 | Status: SHIPPED | OUTPATIENT
Start: 2019-12-16 | End: 2020-09-25 | Stop reason: SDUPTHER

## 2019-12-16 RX ORDER — IBUPROFEN 800 MG/1
TABLET ORAL
Qty: 360 TABLET | Refills: 3 | Status: SHIPPED | OUTPATIENT
Start: 2019-12-16 | End: 2020-09-25 | Stop reason: SDUPTHER

## 2020-01-07 ENCOUNTER — NURSE ONLY (OUTPATIENT)
Dept: UROLOGY | Age: 60
End: 2020-01-07
Payer: COMMERCIAL

## 2020-01-07 LAB
BILIRUBIN URINE: NEGATIVE
BLOOD URINE, POC: ABNORMAL
CHARACTER, URINE: CLEAR
COLOR, URINE: YELLOW
GLUCOSE URINE: NEGATIVE MG/DL
KETONES, URINE: NEGATIVE
LEUKOCYTE CLUMPS, URINE: ABNORMAL
NITRITE, URINE: NEGATIVE
PH, URINE: 6.5 (ref 5–9)
PROTEIN, URINE: NEGATIVE MG/DL
SPECIFIC GRAVITY, URINE: 1.02 (ref 1–1.03)
UROBILINOGEN, URINE: 0.2 EU/DL (ref 0–1)

## 2020-01-07 PROCEDURE — 81003 URINALYSIS AUTO W/O SCOPE: CPT | Performed by: UROLOGY

## 2020-01-07 PROCEDURE — 51720 TREATMENT OF BLADDER LESION: CPT | Performed by: UROLOGY

## 2020-01-07 NOTE — PROGRESS NOTES
Patient has given me verbal consent to perform BCG Yes      Following Dr. Kennedy Mcguire of [facility]. After insuring patient does not have any symptoms of a Urinary Tract Infection, and after insuring patient is not currently taking any antibiotics, patient is wiped with betadine solution to cleanse urethra opening. 16 Fr straight catheter is inserted without difficulty and bladder is completely emptied. BCG is then mixed with sodium chloride solution using closed transfer system. Then the catheter is attached to bulb syringe and BCG is then poured into the bulb syringe, bulb syringe is capped, and BCG is slowly instilled into bladder. Patient did not have a bladder spasms during instillation. Once all the solution was through the catheter tubing was removed from urethra and discarded into yellow biohazard bag. Pt was administered 50 mg of BCG today. BCG 1 OF 6 INSTILLED WITHOUT DIFFICULTY. INSTRUCTIONS REVIEWED WITH PATIENT. Bladder tumor resection site:  left lateral wall resection site just cephalad to left UO.    Lot Number: [id number]  Expiration Date: 10-  [facility] #: [id number]    Patient instructed once they are home to lay on their right side for 10 minutes, left side for 10 minutes, on their back for 10 minutes, and then on their stomach for 10 minutes. This will ensure that the medication coats the entire bladder. This is only a recommendation not a requirement. Patient was instructed to hold urine for 2 hours and then void, place 2 cups non-diluted bleach (Clorox) into the stool and let stand for 15 minutes prior to flushing. This is to be repeated every time the patient voids for the next 6 hours. Patient is instructed to drink fluids to flush out the bladder. Patient is instructed to call the office ([phone]) if they have any reactions or concerns. Instruction sheet given to patient. Pt appears well, in no apparent distress. Alert and oriented times three, pleasant and cooperative. Vital signs are as documented in vital signs section.

## 2020-01-14 ENCOUNTER — NURSE ONLY (OUTPATIENT)
Dept: UROLOGY | Age: 60
End: 2020-01-14
Payer: COMMERCIAL

## 2020-01-14 LAB
BILIRUBIN URINE: NEGATIVE
BLOOD URINE, POC: ABNORMAL
CHARACTER, URINE: CLEAR
COLOR, URINE: YELLOW
GLUCOSE URINE: NEGATIVE MG/DL
KETONES, URINE: NEGATIVE
LEUKOCYTE CLUMPS, URINE: ABNORMAL
NITRITE, URINE: NEGATIVE
PH, URINE: 5.5 (ref 5–9)
PROTEIN, URINE: NEGATIVE MG/DL
SPECIFIC GRAVITY, URINE: 1.02 (ref 1–1.03)
UROBILINOGEN, URINE: 0.2 EU/DL (ref 0–1)

## 2020-01-14 PROCEDURE — 81003 URINALYSIS AUTO W/O SCOPE: CPT | Performed by: UROLOGY

## 2020-01-14 PROCEDURE — 51720 TREATMENT OF BLADDER LESION: CPT | Performed by: UROLOGY

## 2020-01-21 ENCOUNTER — NURSE ONLY (OUTPATIENT)
Dept: UROLOGY | Age: 60
End: 2020-01-21
Payer: COMMERCIAL

## 2020-01-21 LAB
BILIRUBIN URINE: NEGATIVE
BLOOD URINE, POC: NEGATIVE
CHARACTER, URINE: CLEAR
COLOR, URINE: YELLOW
GLUCOSE URINE: NEGATIVE MG/DL
KETONES, URINE: NEGATIVE
LEUKOCYTE CLUMPS, URINE: NEGATIVE
NITRITE, URINE: NEGATIVE
PH, URINE: 5.5 (ref 5–9)
PROTEIN, URINE: NEGATIVE MG/DL
SPECIFIC GRAVITY, URINE: 1.02 (ref 1–1.03)
UROBILINOGEN, URINE: 0.2 EU/DL (ref 0–1)

## 2020-01-21 PROCEDURE — 81003 URINALYSIS AUTO W/O SCOPE: CPT | Performed by: UROLOGY

## 2020-01-21 PROCEDURE — 51720 TREATMENT OF BLADDER LESION: CPT | Performed by: UROLOGY

## 2020-01-30 ENCOUNTER — NURSE ONLY (OUTPATIENT)
Dept: UROLOGY | Age: 60
End: 2020-01-30
Payer: COMMERCIAL

## 2020-01-30 PROCEDURE — 51720 TREATMENT OF BLADDER LESION: CPT | Performed by: UROLOGY

## 2020-01-30 PROCEDURE — 81003 URINALYSIS AUTO W/O SCOPE: CPT | Performed by: UROLOGY

## 2020-02-04 ENCOUNTER — NURSE ONLY (OUTPATIENT)
Dept: UROLOGY | Age: 60
End: 2020-02-04
Payer: COMMERCIAL

## 2020-02-04 VITALS — SYSTOLIC BLOOD PRESSURE: 138 MMHG | DIASTOLIC BLOOD PRESSURE: 78 MMHG

## 2020-02-04 LAB
BILIRUBIN URINE: NEGATIVE
BLOOD URINE, POC: ABNORMAL
CHARACTER, URINE: CLEAR
COLOR, URINE: YELLOW
GLUCOSE URINE: NEGATIVE MG/DL
KETONES, URINE: NEGATIVE
LEUKOCYTE CLUMPS, URINE: ABNORMAL
NITRITE, URINE: NEGATIVE
PH, URINE: 6 (ref 5–9)
PROTEIN, URINE: NEGATIVE MG/DL
SPECIFIC GRAVITY, URINE: >= 1.03 (ref 1–1.03)
UROBILINOGEN, URINE: 0.2 EU/DL (ref 0–1)

## 2020-02-04 PROCEDURE — 81003 URINALYSIS AUTO W/O SCOPE: CPT | Performed by: UROLOGY

## 2020-02-04 PROCEDURE — 51720 TREATMENT OF BLADDER LESION: CPT | Performed by: UROLOGY

## 2020-02-05 RX ORDER — SILDENAFIL 100 MG/1
TABLET, FILM COATED ORAL
Qty: 18 TABLET | Refills: 3 | Status: SHIPPED | OUTPATIENT
Start: 2020-02-05 | End: 2020-09-25 | Stop reason: SDUPTHER

## 2020-02-11 ENCOUNTER — NURSE ONLY (OUTPATIENT)
Dept: UROLOGY | Age: 60
End: 2020-02-11
Payer: COMMERCIAL

## 2020-02-11 LAB
BILIRUBIN URINE: NEGATIVE
BLOOD URINE, POC: NEGATIVE
CHARACTER, URINE: CLEAR
COLOR, URINE: YELLOW
GLUCOSE URINE: NEGATIVE MG/DL
KETONES, URINE: NEGATIVE
LEUKOCYTE CLUMPS, URINE: ABNORMAL
NITRITE, URINE: NEGATIVE
PH, URINE: 7 (ref 5–9)
PROTEIN, URINE: NEGATIVE MG/DL
SPECIFIC GRAVITY, URINE: 1.02 (ref 1–1.03)
UROBILINOGEN, URINE: 0.2 EU/DL (ref 0–1)

## 2020-02-11 PROCEDURE — 51720 TREATMENT OF BLADDER LESION: CPT | Performed by: UROLOGY

## 2020-02-11 PROCEDURE — 81003 URINALYSIS AUTO W/O SCOPE: CPT | Performed by: UROLOGY

## 2020-02-11 NOTE — PROGRESS NOTES
Patient has given me verbal consent to perform BCG yes      Following Dr. Alexei Cast plan of care. After insuring patient does not have any symptoms of a Urinary Tract Infection, and after insuring patient is not currently taking any antibiotics, patient is wiped with betadine solution to cleanse urethra opening. 16 Fr straight catheter is inserted without difficulty and bladder is completely emptied. BCG is then mixed with sodium chloride solution using closed transfer system. Then the catheter is attached to bulb syringe and BCG is then poured into the bulb syringe, bulb syringe is capped, and BCG is slowly instilled into bladder. Patient did not have a bladder spasms during instillation . Once all the solution was through the catheter tubing was removed from urethra and discarded into yellow biohazard bag. Pt was administered 50 mg of BCG today. BCG 6 OF 6 INSTILLED WITHOUT DIFFICULTY. Bladder tumor resection site: left lateral wall resection site just cephalad to left UO  Lot Number: [id number]  Expiration Date: 10-  [facility] #: [id number]    Patient instructed once they are home to lay on their right side for 10 minutes, left side for 10 minutes, on their back for 10 minutes, and then on their stomach for 10 minutes. This will ensure that the medication coats the entire bladder. This is only a recommendation not a requirement. Patient was instructed to hold urine for 2 hours and then void, place 2 cups non-diluted bleach (Clorox) into the stool and let stand for 15 minutes prior to flushing. This is to be repeated every time the patient voids for the next 6 hours. Patient is instructed to drink fluids to flush out the bladder. Patient is instructed to call the office ([phone]) if they have any reactions or concerns. Instruction sheet given to patient. Pt appears well, in no apparent distress. Alert and oriented times three, pleasant and cooperative.  Vital signs are as documented in vital signs section. Pt to come back to see Dr. Leesa Bustillo in 6 weeks.

## 2020-02-20 ENCOUNTER — TELEPHONE (OUTPATIENT)
Dept: UROLOGY | Age: 60
End: 2020-02-20

## 2020-02-20 NOTE — TELEPHONE ENCOUNTER
Please let pt know I would recommend waiting at least 2 weeks and continuing use of a condom if he will be having intercourse with a partner that is pregnant.

## 2020-03-25 ENCOUNTER — OFFICE VISIT (OUTPATIENT)
Dept: FAMILY MEDICINE CLINIC | Age: 60
End: 2020-03-25
Payer: COMMERCIAL

## 2020-03-25 VITALS
SYSTOLIC BLOOD PRESSURE: 128 MMHG | BODY MASS INDEX: 34.65 KG/M2 | RESPIRATION RATE: 16 BRPM | WEIGHT: 241.5 LBS | HEART RATE: 72 BPM | DIASTOLIC BLOOD PRESSURE: 76 MMHG

## 2020-03-25 PROCEDURE — G8427 DOCREV CUR MEDS BY ELIG CLIN: HCPCS | Performed by: FAMILY MEDICINE

## 2020-03-25 PROCEDURE — 1036F TOBACCO NON-USER: CPT | Performed by: FAMILY MEDICINE

## 2020-03-25 PROCEDURE — G8484 FLU IMMUNIZE NO ADMIN: HCPCS | Performed by: FAMILY MEDICINE

## 2020-03-25 PROCEDURE — 99214 OFFICE O/P EST MOD 30 MIN: CPT | Performed by: FAMILY MEDICINE

## 2020-03-25 PROCEDURE — G8417 CALC BMI ABV UP PARAM F/U: HCPCS | Performed by: FAMILY MEDICINE

## 2020-03-25 PROCEDURE — 3017F COLORECTAL CA SCREEN DOC REV: CPT | Performed by: FAMILY MEDICINE

## 2020-03-25 ASSESSMENT — ENCOUNTER SYMPTOMS
EYES NEGATIVE: 1
SHORTNESS OF BREATH: 0
GASTROINTESTINAL NEGATIVE: 1
BACK PAIN: 1
RESPIRATORY NEGATIVE: 1

## 2020-03-25 NOTE — PROGRESS NOTES
12/5/2019    CYSTOSCOPY TRANSURETHRAL RESECTION BLADDER TUMOR performed [place]ita [place]idy [place] [facility] [place]    CYSTOSCOPY,  BLADDER BIOPSY,  TUR BLADDER TUMOR performed by Ramonita Cassidy MD at [address] [place] [address] note were completed with a voice recording program.  Efforts were made to edit the dictations but occasionally words are mis-transcribed. Review of Systems   Constitutional: Negative. HENT: Negative. Eyes: Negative. Respiratory: Negative. Negative for shortness of breath. Cardiovascular: Negative. Negative for chest pain and leg swelling. Gastrointestinal: Negative. Endocrine: Negative. Genitourinary: Negative for hematuria and urgency. See HPI. Musculoskeletal: Positive for arthralgias, back pain and neck pain. See HPI. Neurological: Negative. Hematological: Negative. Psychiatric/Behavioral: Negative. All other systems reviewed and are negative. Objective:   Physical Exam  Vitals signs and nursing note reviewed. HENT:      Right Ear: Tympanic membrane, ear canal and external ear normal.      Left Ear: Tympanic membrane, ear canal and external ear normal.      Mouth/Throat:      Mouth: Mucous membranes are moist.      Pharynx: Oropharynx is clear. Eyes:      Conjunctiva/sclera: Conjunctivae normal.   Neck:      Vascular: No carotid bruit. Cardiovascular:      Rate and Rhythm: Normal rate and regular rhythm. Pulses: Normal pulses. Heart sounds: Normal heart sounds. Pulmonary:      Effort: Pulmonary effort is normal.      Breath sounds: Normal breath sounds. Abdominal:      General: Bowel sounds are normal.   Musculoskeletal:      Right lower leg: No edema. Left lower leg: No edema. Skin:     General: Skin is warm and dry. Neurological:      General: No focal deficit present.       Mental Status: He is alert and oriented to person, place, and time. Psychiatric:         Mood and Affect: Mood normal.         Assessment:       Diagnosis Orders   1. Essential hypertension  Lipid, Fasting    CBC With Auto Differential    Comprehensive Metabolic Panel, Fasting   2. Malignant neoplasm of posterior wall of urinary bladder (HCC)     3. Hypercholesterolemia  Lipid, Fasting    Comprehensive Metabolic Panel, Fasting   4. Spinal stenosis of lumbar region without neurogenic claudication     5. Primary osteoarthritis involving multiple joints     6. Erectile dysfunction due to arterial insufficiency     7. Medication monitoring encounter  Lipid, Fasting    CBC With Auto Differential    Comprehensive Metabolic Panel, Fasting           Plan:      Orders Placed This Encounter   Procedures    Lipid, Fasting     Standing Status:   Future     Standing Expiration Date:   3/25/2021    CBC With Auto Differential     Standing Status:   Future     Standing Expiration Date:   3/25/2021    Comprehensive Metabolic Panel, Fasting     Standing Status:   Future     Standing Expiration Date:   3/25/2021     There are no discontinued medications. Current Outpatient Medications   Medication Sig Dispense Refill    verapamil (CALAN SR) 180 MG extended release tablet TAKE 1 TABLET NIGHTLY 90 tablet 3    sildenafil (VIAGRA) 100 MG tablet TAKE 1 TABLET AS NEEDED FORERECTILE DYSFUNCTION 18 tablet 3    ibuprofen (IBU) 800 MG tablet TAKE 1 TABLET EVERY 6 HOURS AS NEEDED 360 tablet 3    clonazePAM (KLONOPIN) 0.5 MG tablet Take 1 tablet by mouth 2 times daily as needed for Anxiety for up to 90 days. 60 tablet 2    lisinopril (PRINIVIL;ZESTRIL) 20 MG tablet TAKE 1 TABLET DAILY 90 tablet 3    Red Yeast Rice 600 MG CAPS Take 1 capsule by mouth daily Take by mouth 2 times daily 90 capsule 3    Probiotic Product (PROBIOTIC DAILY PO) Take by mouth daily      fish oil-omega-3 fatty acids 1000 MG capsule Take 2 g by mouth.  Two tablets twice a day      vitamin B-12 (CYANOCOBALAMIN) 100 MCG tablet Take 50 mcg by mouth daily. No current facility-administered medications for this visit. Continue present medications. Continue follow up with urology for rest of bladder cancer treatments. Get labs in the next several weeks. [address] [place] [address] received counseling on the following healthy behaviors: nutrition and exercise  Reviewed prior labs and health maintenance  Continue current medications, diet and exercise. Discussed use, benefit, and side effects of prescribed medications. Barriers to medication compliance addressed. Patient given educational materials - see patient instructions  Was a self-tracking handout given in paper form or via [employer]? Yes    Requested Prescriptions      No prescriptions requested or ordered in this encounter       All patient questions answered. Patient voiced understanding. Quality Measures    Body mass index is 34.65 kg/m². Elevated. Weight control planned discussed Healthy diet and regular exercise. BP: 128/76 Blood pressure is normal. Treatment plan consists of No treatment change needed.     Lab Results   Component Value Date    LDLCALC 148 (H) 03/22/2019    (goal LDL reduction with dx if diabetes is 50% LDL reduction)      PHQ Scores 3/8/2018 3/6/2017   PHQ2 Score 0 2   PHQ9 Score 0 2     Interpretation of Total Score Depression Severity: 1-4 = Minimal depression, 5-9 = Mild depression, 10-14 = Moderate depression, 15-19 = Moderately severe depression, 20-27 = Severe depression          Yannick Zuniga MD

## 2020-03-25 NOTE — PROGRESS NOTES
Chronic Disease Visit Information    BP Readings from Last 3 Encounters:   03/25/20 128/76   02/04/20 138/78   12/12/19 120/60          Hemoglobin A1C (%)   Date Value   09/24/2019 5.1   03/08/2018 5.4   03/09/2017 5.4     LDL Calculated (mg/dL)   Date Value   03/22/2019 148 (H)     HDL (mg/dL)   Date Value   03/22/2019 60.6     BUN (mg/dL)   Date Value   09/20/2019 21     CREATININE (mg/dL)   Date Value   09/20/2019 1.08     Glucose (mg/dL)   Date Value   09/20/2019 105 (H)            Have you changed or started any medications since your last visit including any over-the-counter medicines, vitamins, or herbal medicines? no   Are you having any side effects from any of your medications? -  no  Have you stopped taking any of your medications? Is so, why? -  no    Have you seen any other physician or provider since your last visit? Yes - Records Obtained  Have you had any other diagnostic tests since your last visit? Yes - Records Obtained  Have you been seen in the emergency room and/or had an admission to a hospital since we last saw you? No  Have you had your annual diabetic retinal (eye) exam? No  Have you had your routine dental cleaning in the past 6 months? no    Have you activated your [employer] account? If not, what are your barriers?  Yes     Patient Care Team:  Liyah Guzmán MD as PCP - General (Family Medicine)  Liyah Guzmán MD as PCP - [facility]  Yamila French MD as Consulting Physician (Urology)         Medical History Review  Past Medical, Family, and Social History reviewed and does contribute to the patient presenting condition    Health Maintenance   Topic Date Due    Hepatitis C screen  1960    HIV screen  06/15/1976    DTaP/Tdap/Td vaccine (1 - Tdap) 06/15/1980    Shingles Vaccine (1 of 2) 06/15/2011    Flu vaccine (1) 09/24/2020 (Originally 9/1/2019)    Creatinine monitoring  09/20/2020    A1C test (Diabetic or Prediabetic)  09/24/2020    Potassium monitoring

## 2020-03-25 NOTE — PATIENT INSTRUCTIONS
taking aspirin isn't right for everyone, because it can cause serious bleeding. · See your doctor regularly. You may need to see the doctor more often at first or until your blood pressure comes down. · If you are taking blood pressure medicine, talk to your doctor before you take decongestants or anti-inflammatory medicine, such as ibuprofen. Some of these medicines can raise blood pressure. · Learn how to check your blood pressure at home. Lifestyle changes  · Stay at a healthy weight. This is especially important if you put on weight around the waist. Losing even 10 pounds can help you lower your blood pressure. · If your doctor recommends it, get more exercise. Walking is a good choice. Bit by bit, increase the amount you walk every day. Try for at least 30 minutes on most days of the week. You also may want to swim, bike, or do other activities. · Avoid or limit alcohol. Talk to your doctor about whether you can drink any alcohol. · Try to limit how much sodium you eat to less than 2,300 milligrams (mg) a day. Your doctor may ask you to try to eat less than 1,500 mg a day. · Eat plenty of fruits (such as bananas and oranges), vegetables, legumes, whole grains, and low-fat dairy products. · Lower the amount of saturated fat in your diet. Saturated fat is found in animal products such as milk, cheese, and meat. Limiting these foods may help you lose weight and also lower your risk for heart disease. · Do not smoke. Smoking increases your risk for heart attack and stroke. If you need help quitting, talk to your doctor about stop-smoking programs and medicines. These can increase your chances of quitting for good. When should you call for help? Call  911 anytime you think you may need emergency care. This may mean having symptoms that suggest that your blood pressure is causing a serious heart or blood vessel problem.  Your blood pressure may be over 180/120.   For example, call  911 if:    · You have have questions about a medical condition or this instruction, always ask your healthcare professional. [place] [address] any warranty or liability for your use of this information.

## 2020-03-26 LAB
ABSOLUTE BASO #: 0 X10E9/L (ref 0–0.9)
ABSOLUTE EOS #: 0.1 X10E9/L (ref 0–0.4)
ABSOLUTE LYMPH #: 1.6 X10E9/L (ref 1–3.5)
ABSOLUTE MONO #: 0.5 X10E9/L (ref 0–0.9)
ABSOLUTE NEUT #: 2.6 X10E9/L (ref 1.5–6.6)
ALBUMIN SERPL-MCNC: 4.5 G/DL (ref 3.2–5.3)
ALK PHOSPHATASE: 52 U/L (ref 39–130)
ALT SERPL-CCNC: 27 U/L (ref 0–40)
ANION GAP SERPL CALCULATED.3IONS-SCNC: 10 MMOL/L (ref 5–15)
AST SERPL-CCNC: 30 U/L (ref 0–41)
BASOPHILS RELATIVE PERCENT: 0.5 %
BILIRUB SERPL-MCNC: 0.6 MG/DL (ref 0.3–1.2)
BUN BLDV-MCNC: 19 MG/DL (ref 5–23)
CALCIUM SERPL-MCNC: 9.2 MG/DL (ref 8.5–10.5)
CHLORIDE BLD-SCNC: 101 MMOL/L (ref 98–109)
CHOLESTEROL/HDL RATIO: 2.7 (ref 1–5)
CHOLESTEROL: 193 MG/DL (ref 150–200)
CO2: 26 MMOL/L (ref 22–32)
CREAT SERPL-MCNC: 1.18 MG/DL (ref 0.6–1.3)
EGFR AFRICAN AMERICAN: >60 ML/MIN/1.73SQ.M
EGFR IF NONAFRICAN AMERICAN: >60 ML/MIN/1.73SQ.M
EOSINOPHILS RELATIVE PERCENT: 1.7 %
GLUCOSE: 91 MG/DL (ref 65–99)
HCT VFR BLD CALC: 44.9 % (ref 39–49)
HDLC SERPL-MCNC: 71 MG/DL
HEMOGLOBIN: 15.5 G/DL (ref 13.1–17.3)
LDL CHOLESTEROL CALCULATED: 111 MG/DL
LDL/HDL RATIO: 1.6
LYMPHOCYTE %: 34 %
MCH RBC QN AUTO: 34.6 PG (ref 27–35)
MCHC RBC AUTO-ENTMCNC: 34.5 G/DL (ref 32–36)
MCV RBC AUTO: 100 FL (ref 81–101)
MONOCYTES # BLD: 10.6 %
NEUTROPHILS RELATIVE PERCENT: 53.2 %
PDW BLD-RTO: 12.1 % (ref 11.4–14.3)
PLATELETS: 192 X10E9/L (ref 150–450)
PMV BLD AUTO: 8.8 FL (ref 7–12)
POTASSIUM SERPL-SCNC: 4.8 MMOL/L (ref 3.5–5)
RBC: 4.47 X10E12/L (ref 4.25–5.65)
SODIUM BLD-SCNC: 137 MMOL/L (ref 134–146)
TOTAL PROTEIN: 7.1 G/DL (ref 6–8)
TRIGL SERPL-MCNC: 55 MG/DL (ref 27–150)
VLDLC SERPL CALC-MCNC: 11 MG/DL (ref 0–30)
WBC: 4.8 X10E9/L (ref 4.8–10.8)

## 2020-04-02 ENCOUNTER — PROCEDURE VISIT (OUTPATIENT)
Dept: UROLOGY | Age: 60
End: 2020-04-02
Payer: COMMERCIAL

## 2020-04-02 VITALS — TEMPERATURE: 98.4 F | BODY MASS INDEX: 35.06 KG/M2 | WEIGHT: 244.9 LBS | HEIGHT: 70 IN

## 2020-04-02 LAB
BILIRUBIN URINE: NEGATIVE
BLOOD URINE, POC: NORMAL
CHARACTER, URINE: CLEAR
COLOR, URINE: YELLOW
GLUCOSE URINE: NEGATIVE MG/DL
KETONES, URINE: NEGATIVE
LEUKOCYTE CLUMPS, URINE: NEGATIVE
NITRITE, URINE: NEGATIVE
PH, URINE: 6 (ref 5–9)
PROTEIN, URINE: NEGATIVE MG/DL
SPECIFIC GRAVITY, URINE: 1.02 (ref 1–1.03)
UROBILINOGEN, URINE: 0.2 EU/DL (ref 0–1)

## 2020-04-02 PROCEDURE — 81003 URINALYSIS AUTO W/O SCOPE: CPT | Performed by: UROLOGY

## 2020-04-02 PROCEDURE — 52000 CYSTOURETHROSCOPY: CPT | Performed by: UROLOGY

## 2020-06-15 RX ORDER — LISINOPRIL 20 MG/1
TABLET ORAL
Qty: 90 TABLET | Refills: 3 | Status: SHIPPED | OUTPATIENT
Start: 2020-06-15 | End: 2020-09-25 | Stop reason: SDUPTHER

## 2020-07-02 ENCOUNTER — PROCEDURE VISIT (OUTPATIENT)
Dept: UROLOGY | Age: 60
End: 2020-07-02
Payer: COMMERCIAL

## 2020-07-02 VITALS — HEIGHT: 70 IN | WEIGHT: 243 LBS | BODY MASS INDEX: 34.79 KG/M2 | TEMPERATURE: 98.8 F

## 2020-07-02 LAB
BILIRUBIN URINE: NEGATIVE
BLOOD URINE, POC: NEGATIVE
CHARACTER, URINE: CLEAR
COLOR, URINE: YELLOW
GLUCOSE URINE: NEGATIVE MG/DL
KETONES, URINE: NEGATIVE
LEUKOCYTE CLUMPS, URINE: NEGATIVE
NITRITE, URINE: NEGATIVE
PH, URINE: 7 (ref 5–9)
PROTEIN, URINE: NEGATIVE MG/DL
SPECIFIC GRAVITY, URINE: 1.01 (ref 1–1.03)
UROBILINOGEN, URINE: 0.2 EU/DL (ref 0–1)

## 2020-07-02 PROCEDURE — 81003 URINALYSIS AUTO W/O SCOPE: CPT | Performed by: UROLOGY

## 2020-07-02 PROCEDURE — 52000 CYSTOURETHROSCOPY: CPT | Performed by: UROLOGY

## 2020-07-02 NOTE — PROGRESS NOTES
Kaylynn Doran MD   Urology Clinic procedure visit      Patient:  Sun Bains  YOB: 1960  Date: 12/12/2019    HISTORY OF PRESENT ILLNESS:   The patient is a 63 y.o. male who presents today for evaluation of the following problem(s): gross hematuria  Overall the problem(s) : no change  Associated Symptoms: No dysuria, gross hematuria. Pain Severity:      Summary of old records:     Duration: 2-3 years  Location:  bladder  alleviating factors: none  Aggravating factors: activity  Associated with: clots  Frequency: all the time    Worsening GH with clots  Non smoker    (Patient's old records, notes and chart reviewed and summarized above.)    11/7/19  TURBT  FINAL DIAGNOSIS:  Bladder tumor, transurethral resection:   Noninvasive high-grade papillary urothelial carcinoma.   No muscularis propria present. 12/5/19 repeat TURBT  FINAL DIAGNOSIS:  Bladder tumor, transurethral resection:   Noninvasive high-grade papillary urothelial carcinoma.   Muscularis propria present and uninvolved by carcinoma    Completed 6 rounds of intravesical therapy 6 weeks ago. Denies gross hematuria. Cystoscopy for 2020 showed a scar in the old resection site but no evidence of recurrence. He is here today for his second 3-month surveillance cystoscopy    Cystoscopy Operative Note    Findings:   The patient was prepped and draped in the usual sterile fashion. The flexible cystoscope was advanced through the urethra and into the bladder. The bladder was thoroughly inspected and the following was noted:      Urethra: No abnormalities of the urethra are noted. Prostate: Normal-appearing and nonobstructive  Bladder: Previously noted small mucosal colored bump in the area of the old resection is still present without evidence of growth in size. [place] No obvious papillary tumor. Likely scar. Ureters:  Orifices with normal configuration and location. The cystoscope was removed.   The patient tolerated the Statement Selected

## 2020-09-25 ENCOUNTER — OFFICE VISIT (OUTPATIENT)
Dept: FAMILY MEDICINE CLINIC | Age: 60
End: 2020-09-25
Payer: COMMERCIAL

## 2020-09-25 VITALS
WEIGHT: 239.2 LBS | BODY MASS INDEX: 34.32 KG/M2 | TEMPERATURE: 97.2 F | RESPIRATION RATE: 20 BRPM | HEART RATE: 64 BPM | DIASTOLIC BLOOD PRESSURE: 60 MMHG | SYSTOLIC BLOOD PRESSURE: 116 MMHG

## 2020-09-25 PROCEDURE — 1036F TOBACCO NON-USER: CPT | Performed by: FAMILY MEDICINE

## 2020-09-25 PROCEDURE — G8417 CALC BMI ABV UP PARAM F/U: HCPCS | Performed by: FAMILY MEDICINE

## 2020-09-25 PROCEDURE — 3017F COLORECTAL CA SCREEN DOC REV: CPT | Performed by: FAMILY MEDICINE

## 2020-09-25 PROCEDURE — 99214 OFFICE O/P EST MOD 30 MIN: CPT | Performed by: FAMILY MEDICINE

## 2020-09-25 PROCEDURE — G8427 DOCREV CUR MEDS BY ELIG CLIN: HCPCS | Performed by: FAMILY MEDICINE

## 2020-09-25 RX ORDER — SILDENAFIL 100 MG/1
100 TABLET, FILM COATED ORAL PRN
Qty: 18 TABLET | Refills: 3 | Status: SHIPPED | OUTPATIENT
Start: 2020-09-25

## 2020-09-25 RX ORDER — LISINOPRIL 20 MG/1
20 TABLET ORAL DAILY
Qty: 90 TABLET | Refills: 3 | Status: SHIPPED | OUTPATIENT
Start: 2020-09-25 | End: 2021-07-13 | Stop reason: SDUPTHER

## 2020-09-25 RX ORDER — IBUPROFEN 800 MG/1
TABLET ORAL
Qty: 360 TABLET | Refills: 3 | Status: SHIPPED | OUTPATIENT
Start: 2020-09-25

## 2020-09-25 RX ORDER — CLONAZEPAM 0.5 MG/1
0.5 TABLET ORAL 2 TIMES DAILY PRN
Qty: 180 TABLET | Refills: 1 | Status: SHIPPED | OUTPATIENT
Start: 2020-09-25 | End: 2021-03-25 | Stop reason: SDUPTHER

## 2020-09-25 ASSESSMENT — ENCOUNTER SYMPTOMS
RESPIRATORY NEGATIVE: 1
ALLERGIC/IMMUNOLOGIC NEGATIVE: 1
BACK PAIN: 1
GASTROINTESTINAL NEGATIVE: 1

## 2020-09-25 NOTE — PROGRESS NOTES
Subjective:      Patient ID: Daisy Lowe is a 62 y.o. male. HPI  Follow up of chronic conditions. Encounter Diagnoses   Name Primary?  Essential hypertension Yes    Sciatica of right side, intermittent.  Cervical stenosis of spinal canal     Chronic anxiety     Erectile dysfunction due to arterial insufficiency     Hypercholesterolemia     IFG (impaired fasting glucose)     Medication monitoring encounter      HTN is stable with current medications. Pt has no medication side effects nor orthostatic symptoms. BP Readings from Last 3 Encounters:   09/25/20 116/60   03/25/20 128/76   02/04/20 138/78     Lipid values continue to be reasonably controlled with red yeast rice. He wants to continue avoiding statins if at all possible. Cholesterol, Total (mg/dL)   Date Value   03/09/2017 220     Cholesterol (mg/dL)   Date Value   03/26/2020 193     HDL (mg/dL)   Date Value   03/26/2020 71     Triglycerides (mg/dL)   Date Value   03/26/2020 55     Lab Results   Component Value Date    LDLCALC 111 03/26/2020     Electrolyte balance as well as renal function and liver functions are all within normal limits. No medication changes are indicated. His blood sugar continues to be in normal range so he is currently not prediabetic. Lab Results   Component Value Date     03/26/2020    K 4.8 03/26/2020     03/26/2020    CO2 26 03/26/2020    BUN 19 03/26/2020    CREATININE 1.18 03/26/2020    GLUCOSE 91 03/26/2020    CALCIUM 9.2 03/26/2020    PROT 7.1 03/26/2020    LABALBU 4.5 03/26/2020    BILITOT 0.6 03/26/2020    ALKPHOS 52 03/26/2020    AST 30 03/26/2020    ALT 27 03/26/2020    LABGLOM 88 (A) 03/03/2016     His erectile dysfunction continues to respond to Viagra which she uses as needed. Refill will be given today. The rest of this patient's conditions are stable. Past medical and surgical hx reviewed.   Past Medical History:   Diagnosis Date    Bursitis     Cancer [facility]) 11/2019 bladder cancer    Chest pain     Erectile dysfunction     Hyperlipidemia     Hypertension     Sleep apnea      Past Surgical History:   Procedure Laterality Date    CARDIAC CATHETERIZATION  6/30/2011    CYSTOSCOPY N/A 12/5/2019    CYSTOSCOPY TRANSURETHRAL RESECTION BLADDER TUMOR performed by Shruthi Berkowitz MD at [address] [place] [address] N/A 11/7/2019    CYSTOSCOPY,  BLADDER BIOPSY,  TUR BLADDER TUMOR performed by Shruthi Berkowitz MD at [address] [place] [address] of this note were completed with a voice recording program.  Efforts were made to edit the dictations but occasionally words are mis-transcribed. Review of Systems   Constitutional: Negative. HENT: Negative. Respiratory: Negative. Cardiovascular: Negative. Negative for chest pain, palpitations and leg swelling. Gastrointestinal: Negative. Endocrine: Negative. Genitourinary: Negative for dysuria and hematuria. See GI. Musculoskeletal: Positive for arthralgias and back pain. Skin: Negative. Allergic/Immunologic: Negative. Neurological: Negative. Hematological: Negative. Psychiatric/Behavioral: Negative. All other systems reviewed and are negative. Objective:   Physical Exam  Vitals signs and nursing note reviewed. Constitutional:       Appearance: He is normal weight. HENT:      Right Ear: Tympanic membrane, ear canal and external ear normal.      Left Ear: Tympanic membrane, ear canal and external ear normal.      Nose: Nose normal.      Mouth/Throat:      Mouth: Mucous membranes are moist.      Pharynx: Oropharynx is clear. Eyes:      Conjunctiva/sclera: Conjunctivae normal.      Pupils: Pupils are equal, round, and reactive to light. Cardiovascular:      Rate and Rhythm: Normal rate and regular rhythm. Pulses: Normal pulses. Heart sounds: Normal heart sounds. No murmur. No gallop.     Pulmonary:      Effort: Pulmonary effort is normal. Breath sounds: Normal breath sounds. Abdominal:      General: Bowel sounds are normal.   Musculoskeletal:      Right lower leg: No edema. Left lower leg: No edema. Skin:     General: Skin is warm and dry. Capillary Refill: Capillary refill takes less than 2 seconds. Neurological:      General: No focal deficit present. Mental Status: He is alert and oriented to person, place, and time. Psychiatric:         Mood and Affect: Mood normal.         Assessment:       Diagnosis Orders   1. Essential hypertension  verapamil (CALAN SR) 180 MG extended release tablet    lisinopril (PRINIVIL;ZESTRIL) 20 MG tablet   2. Sciatica of right side, intermittent. ibuprofen (IBU) 800 MG tablet   3. Cervical stenosis of spinal canal  ibuprofen (IBU) 800 MG tablet   4. Chronic anxiety  clonazePAM (KLONOPIN) 0.5 MG tablet   5. Erectile dysfunction due to arterial insufficiency  sildenafil (VIAGRA) 100 MG tablet   6. Hypercholesterolemia     7. IFG (impaired fasting glucose)     8. Medication monitoring encounter             Plan:      No orders of the defined types were placed in this encounter.     Medications Discontinued During This Encounter   Medication Reason    verapamil (CALAN SR) 180 MG extended release tablet REORDER    lisinopril (PRINIVIL;ZESTRIL) 20 MG tablet REORDER    ibuprofen (IBU) 800 MG tablet REORDER    clonazePAM (KLONOPIN) 0.5 MG tablet REORDER    sildenafil (VIAGRA) 100 MG tablet REORDER     Current Outpatient Medications   Medication Sig Dispense Refill    Multiple Vitamins-Minerals (MULTIVITAMIN MEN PO) Take by mouth      verapamil (CALAN SR) 180 MG extended release tablet TAKE 1 TABLET NIGHTLY 90 tablet 3    lisinopril (PRINIVIL;ZESTRIL) 20 MG tablet Take 1 tablet by mouth daily 90 tablet 3    ibuprofen (IBU) 800 MG tablet TAKE 1 TABLET EVERY 6 HOURS AS NEEDED 360 tablet 3    clonazePAM (KLONOPIN) 0.5 MG tablet Take 1 tablet by mouth 2 times daily as needed for Anxiety for up to 180 days. 180 tablet 1    sildenafil (VIAGRA) 100 MG tablet Take 1 tablet by mouth as needed for Erectile Dysfunction 18 tablet 3    Red Yeast Rice 600 MG CAPS Take 1 capsule by mouth daily Take by mouth 2 times daily 90 capsule 3    Probiotic Product (PROBIOTIC DAILY PO) Take by mouth daily      fish oil-omega-3 fatty acids 1000 MG capsule Take 2 g by mouth. Two tablets twice a day      vitamin B-12 (CYANOCOBALAMIN) 100 MCG tablet Take 50 mcg by mouth daily. No current facility-administered medications for this visit. Continue present medications. Discussed use, benefit, and side effects of prescribed medications. Barriers to compliance discussed. All patient questions answered. Pt voiced understanding.           Sasha Brito MD

## 2020-09-25 NOTE — PATIENT INSTRUCTIONS
You may receive a survey regarding the care you received during your visit. Your input is valuable to us. We encourage you to complete and return your survey. We hope you will choose us in the future for your healthcare needs. Continue present medications.

## 2020-09-25 NOTE — PROGRESS NOTES
Chronic Disease Visit Information    BP Readings from Last 3 Encounters:   03/25/20 128/76   02/04/20 138/78   12/12/19 120/60          Hemoglobin A1C (%)   Date Value   09/24/2019 5.1   03/08/2018 5.4   03/09/2017 5.4     LDL Calculated (mg/dL)   Date Value   03/26/2020 111     HDL (mg/dL)   Date Value   03/26/2020 71     BUN (mg/dL)   Date Value   03/26/2020 19     CREATININE (mg/dL)   Date Value   03/26/2020 1.18     Glucose (mg/dL)   Date Value   03/26/2020 91            Have you changed or started any medications since your last visit including any over-the-counter medicines, vitamins, or herbal medicines? Yes, see list  Are you having any side effects from any of your medications? -  no  Have you stopped taking any of your medications? Is so, why? -  no    Have you seen any other physician or provider since your last visit? Yes - Records Obtained  Have you had any other diagnostic tests since your last visit? Yes - Records Obtained  Have you been seen in the emergency room and/or had an admission to a hospital since we last saw you? No  Have you had your annual diabetic retinal (eye) exam? No  Have you had your routine dental cleaning in the past 6 months? no    Have you activated your [employer] [place] account? If not, what are your barriers?  Yes     Patient Care Team:  Noris Bernal MD as PCP - General (Family Medicine)  Noris Bernal MD as PCP - [facility] Provider  Cintia Ortiz MD as Consulting Physician (Urology)  Celso Phelps RN as Nurse Navigator (Oncology)         Medical History Review  Past Medical, Family, and Social History reviewed and does contribute to the patient presenting condition    Health Maintenance   Topic Date Due    Hepatitis C screen  1960    HIV screen  06/15/1976    DTaP/Tdap/Td vaccine (1 - Tdap) 06/15/1980    Shingles Vaccine (1 of 2) 06/15/2011    Flu vaccine (1) 09/01/2020    A1C test (Diabetic or Prediabetic)  09/24/2020    Potassium monitoring  03/26/2021    Creatinine monitoring  03/26/2021    Lipid screen  03/26/2025    Colon cancer screen colonoscopy  06/28/2026    Hepatitis A vaccine  Aged Out    Hepatitis B vaccine  Aged Out    Hib vaccine  Aged Out    Meningococcal (ACWY) vaccine  Aged Out    Pneumococcal 0-64 years Vaccine  Aged Out

## 2020-10-09 ENCOUNTER — PROCEDURE VISIT (OUTPATIENT)
Dept: UROLOGY | Age: 60
End: 2020-10-09
Payer: COMMERCIAL

## 2020-10-09 VITALS — TEMPERATURE: 97.4 F | HEIGHT: 70 IN | BODY MASS INDEX: 34.36 KG/M2 | WEIGHT: 240 LBS

## 2020-10-09 LAB
BILIRUBIN URINE: ABNORMAL
BLOOD URINE, POC: NEGATIVE
CHARACTER, URINE: CLEAR
COLOR, URINE: YELLOW
GLUCOSE URINE: NEGATIVE MG/DL
KETONES, URINE: NEGATIVE
LEUKOCYTE CLUMPS, URINE: NEGATIVE
NITRITE, URINE: NEGATIVE
PH, URINE: 6 (ref 5–9)
PROTEIN, URINE: NEGATIVE MG/DL
SPECIFIC GRAVITY, URINE: 1.02 (ref 1–1.03)
UROBILINOGEN, URINE: 0.2 EU/DL (ref 0–1)

## 2020-10-09 PROCEDURE — 52000 CYSTOURETHROSCOPY: CPT | Performed by: UROLOGY

## 2020-10-09 PROCEDURE — 81003 URINALYSIS AUTO W/O SCOPE: CPT | Performed by: UROLOGY

## 2020-10-09 NOTE — PROGRESS NOTES
Ramonita Call MD   Urology Clinic procedure visit      Patient:  Yuri Amaya  YOB: 1960  Date: 12/12/2019    HISTORY OF PRESENT ILLNESS:   The patient is a 62 y.o. male who presents today for evaluation of the following problem(s): gross hematuria  Overall the problem(s) : no change  Associated Symptoms: No dysuria, gross hematuria. Pain Severity:      Summary of old records:     Duration: 2-3 years  Location:  bladder  alleviating factors: none  Aggravating factors: activity  Associated with: clots  Frequency: all the time    (Patient's old records, notes and chart reviewed and summarized above.)    11/7/19  TURBT  FINAL DIAGNOSIS:  Bladder tumor, transurethral resection:   Noninvasive high-grade papillary urothelial carcinoma.   No muscularis propria present. 12/5/19 repeat TURBT  FINAL DIAGNOSIS:  Bladder tumor, transurethral resection:   Noninvasive high-grade papillary urothelial carcinoma.   Muscularis propria present and uninvolved by carcinoma    Completed 6 rounds of intravesical therapy. Denies gross hematuria or new urinary symptoms. He is here today for his third 3-month surveillance cystoscopy    Cystoscopy Operative Note    Findings:   The patient was prepped and draped in the usual sterile fashion. The flexible cystoscope was advanced through the urethra and into the bladder. The bladder was thoroughly inspected and the following was noted:      Urethra: No abnormalities of the urethra are noted. Prostate: Normal-appearing and nonobstructive  Bladder: Normal appearing scar tissue on the left side of the lateral wall of the bladder without evidence of any tumors. Ureters:  Orifices with normal configuration and location. The cystoscope was removed. The patient tolerated the procedure well.       Bring back in 3 months for his fourth 3-month surveillance cystoscopy

## 2021-01-07 ENCOUNTER — HOSPITAL ENCOUNTER (OUTPATIENT)
Dept: UROLOGY | Age: 61
Discharge: HOME | End: 2021-01-07
Payer: COMMERCIAL

## 2021-01-07 VITALS
TEMPERATURE: 98.3 F | DIASTOLIC BLOOD PRESSURE: 85 MMHG | SYSTOLIC BLOOD PRESSURE: 165 MMHG | HEART RATE: 56 BPM | WEIGHT: 240 LBS | BODY MASS INDEX: 34.44 KG/M2

## 2021-01-07 LAB
BILIRUBIN URINE: NEGATIVE
BLOOD URINE, POC: NEGATIVE
CHARACTER, URINE: CLEAR
COLOR, URINE: YELLOW
GLUCOSE URINE: NEGATIVE MG/DL
KETONES, URINE: NEGATIVE
LEUKOCYTE CLUMPS, URINE: NEGATIVE
NITRITE, URINE: NEGATIVE
PH, URINE: 6.5 (ref 5–9)
PROTEIN, URINE: NEGATIVE MG/DL
SPECIFIC GRAVITY, URINE: 1.01 (ref 1–1.03)
UROBILINOGEN, URINE: 0.2 EU/DL (ref 0–1)

## 2021-01-07 PROCEDURE — 52000 CYSTOURETHROSCOPY: CPT

## 2021-01-07 PROCEDURE — 81003 URINALYSIS AUTO W/O SCOPE: CPT

## 2021-01-07 RX ORDER — LIDOCAINE HYDROCHLORIDE 20 MG/ML
JELLY TOPICAL PRN
Status: DISCONTINUED | OUTPATIENT
Start: 2021-01-07 | End: 2021-01-08 | Stop reason: HOSPADM

## 2021-01-07 NOTE — PROGRESS NOTES
0800 [facility] in [facility]. 2344 This procedure has been fully reviewed with the patient and written informed consent has been obtained. 0477 Procedure started with . 5446 Procedure completed; patient tolerated well. Dr. Fabio gutierrez talked to patient in length about procedure findings. 1302 Patient discharged. PLAN Schedule a cystoscopy in 3 months at [facility].

## 2021-01-11 ENCOUNTER — OFFICE VISIT (OUTPATIENT)
Dept: PULMONOLOGY | Age: 61
End: 2021-01-11
Payer: COMMERCIAL

## 2021-01-11 VITALS
WEIGHT: 241.8 LBS | HEIGHT: 70 IN | SYSTOLIC BLOOD PRESSURE: 138 MMHG | TEMPERATURE: 97.7 F | HEART RATE: 57 BPM | OXYGEN SATURATION: 98 % | BODY MASS INDEX: 34.62 KG/M2 | DIASTOLIC BLOOD PRESSURE: 72 MMHG

## 2021-01-11 DIAGNOSIS — G47.09 OTHER INSOMNIA: ICD-10-CM

## 2021-01-11 DIAGNOSIS — E66.9 OBESITY (BMI 30-39.9): ICD-10-CM

## 2021-01-11 DIAGNOSIS — G47.33 OSA ON CPAP: Primary | ICD-10-CM

## 2021-01-11 DIAGNOSIS — G47.10 HYPERSOMNIA: ICD-10-CM

## 2021-01-11 DIAGNOSIS — Z99.89 OSA ON CPAP: Primary | ICD-10-CM

## 2021-01-11 PROCEDURE — 99214 OFFICE O/P EST MOD 30 MIN: CPT | Performed by: PHYSICIAN ASSISTANT

## 2021-01-11 ASSESSMENT — ENCOUNTER SYMPTOMS
WHEEZING: 0
BACK PAIN: 0
COUGH: 0
ALLERGIC/IMMUNOLOGIC NEGATIVE: 1
DIARRHEA: 0
CHEST TIGHTNESS: 0
SHORTNESS OF BREATH: 0
EYES NEGATIVE: 1
STRIDOR: 0
NAUSEA: 0

## 2021-01-11 NOTE — PATIENT INSTRUCTIONS
Try taking Tylenol when having joint discomfort through the night  Consider new mattress to improve comfort  May also try klonopin at night for insomnia

## 2021-01-11 NOTE — PROGRESS NOTES
[facility]      Susi Husain         [id number]  1/11/2021   Chief Complaint   Patient presents with    Follow-up     DILEEP 16 mo sleep follow up with [facility] download        Pt of Dr. Nikita Rowe    PAP Download:   Original or initial AHI: 36.2     Date of initial study: 1/19/2011      Compliant  90%     Noncompliant 10 %     PAP Type CPAPLevel  11   Avg Hrs/Day 6 hr 37 min  AHI: 0.2   Recorded compliance dates , 12/8/2020  to 1/6/2021   Machine/Mfg:   [x] ResMed    [x] Respironics/Dreamstation   Interface:   [] Nasal    [] Nasal pillows   [x] FFM      Provider:      [x] [facility]     [][employer]     [] [employer]    [] [address]    [] [place]               [] [facility]      [] [employer]    [] [address]      [] Other    Neck Size: 15.75  Mallampati Mallampati 3  ESS:  8  SAQLI: 38     Here is a scan of the most recent download:            Presentation:   Pamela Coppola presents for sleep medicine follow up for obstructive sleep apnea  Since the last visit, Pamela Coppola is doing well with PAP. He is occ waking up about 230 am and then will have trouble falling asleep. He has klonopin for anxiety but has not been taking. He is tired mos to the day. He is waking up with joint pain. Equipment issues: The pressure is  acceptable, the mask is acceptable     Sleep issues:  Do you feel better? No  More rested? No   Better concentration? no    Progress History:   Since last visit any new medical issues? Yes bladder Cancer  New ER or hospitlal visits? No  Any new or changes in medicines? No  Any new sleep medicines?  No        Past Medical History:   Diagnosis Date    Bursitis     Cancer ([address] 11/2019    bladder cancer    Chest pain     Erectile dysfunction     Hyperlipidemia     Hypertension     Sleep apnea        Past Surgical History:   Procedure Laterality Date    CARDIAC CATHETERIZATION  6/30/2011    CYSTOSCOPY N/A 12/5/2019    CYSTOSCOPY TRANSURETHRAL RESECTION BLADDER TUMOR performed by Brad Valverde MD at [facility] N/A 11/7/2019    CYSTOSCOPY,  BLADDER BIOPSY,  TUR BLADDER TUMOR performed by Brad Valverde MD at [facility] EXTRACTION         Social History     Tobacco Use    Smoking status: Never Smoker    Smokeless tobacco: Never Used   Substance Use Topics    Alcohol use: Yes     Alcohol/week: 0.0 standard drinks     Comment: daily 6 beers    Drug use: No       No Known Allergies    Current Outpatient Medications   Medication Sig Dispense Refill    Multiple Vitamins-Minerals (MULTIVITAMIN MEN PO) Take by mouth      verapamil (CALAN SR) 180 MG extended release tablet TAKE 1 TABLET NIGHTLY 90 tablet 3    lisinopril (PRINIVIL;ZESTRIL) 20 MG tablet Take 1 tablet by mouth daily 90 tablet 3    ibuprofen (IBU) 800 MG tablet TAKE 1 TABLET EVERY 6 HOURS AS NEEDED 360 tablet 3    clonazePAM (KLONOPIN) 0.5 MG tablet Take 1 tablet by mouth 2 times daily as needed for Anxiety for up to 180 days. 180 tablet 1    sildenafil (VIAGRA) 100 MG tablet Take 1 tablet by mouth as needed for Erectile Dysfunction 18 tablet 3    Red Yeast Rice 600 MG CAPS Take 1 capsule by mouth daily Take by mouth 2 times daily 90 capsule 3    Probiotic Product (PROBIOTIC DAILY PO) Take by mouth daily      fish oil-omega-3 fatty acids 1000 MG capsule Take 2 g by mouth. Two tablets twice a day      vitamin B-12 (CYANOCOBALAMIN) 100 MCG tablet Take 50 mcg by mouth daily. No current facility-administered medications for this visit. Family History   Problem Relation Age of Onset    Heart Disease Mother     Diabetes Mother     Heart Disease Father     Heart Surgery Father     Prostate Cancer Neg Hx         Review of Systems -   Review of Systems   Constitutional: Negative for activity change, appetite change, chills and fever. HENT: Negative for congestion and postnasal drip. Eyes: Negative.     Respiratory: Negative for cough, chest tightness, shortness of breath, wheezing and stridor. Cardiovascular: Negative for chest pain and leg swelling. Gastrointestinal: Negative for diarrhea and nausea. Endocrine: Negative. Genitourinary: Negative. Musculoskeletal: Negative. Negative for arthralgias and back pain. Skin: Negative. Allergic/Immunologic: Negative. Neurological: Negative. Negative for dizziness and light-headedness. Psychiatric/Behavioral: Positive for sleep disturbance. All other systems reviewed and are negative. Physical Exam:    BMI:  Body mass index is 34.69 kg/m². Wt Readings from Last 3 Encounters:   01/11/21 241 lb 12.8 oz (109.7 kg)   01/07/21 240 lb (108.9 kg)   10/09/20 240 lb (108.9 kg)     Weight stable / unchanged  Vitals: /72 (Site: Right Upper Arm, Position: Sitting, Cuff Size: Large Adult)   Pulse 57   Temp 97.7 °F (36.5 °C) (Temporal)   Ht 5' 10\" (1.778 m)   Wt 241 lb 12.8 oz (109.7 kg)   SpO2 98% Comment: Room Air  BMI 34.69 kg/m²       Physical Exam  Constitutional:       Appearance: He is well-developed. HENT:      Head: Normocephalic and atraumatic. Right Ear: External ear normal.      Left Ear: External ear normal.   Eyes:      Conjunctiva/sclera: Conjunctivae normal.      Pupils: Pupils are equal, round, and reactive to light. Neck:      Musculoskeletal: Normal range of motion and neck supple. Cardiovascular:      Rate and Rhythm: Normal rate and regular rhythm. Heart sounds: Normal heart sounds. Pulmonary:      Effort: Pulmonary effort is normal.      Breath sounds: Normal breath sounds. Musculoskeletal: Normal range of motion. Skin:     General: Skin is warm and dry. Neurological:      Mental Status: He is alert and oriented to person, place, and time. Psychiatric:         Behavior: Behavior normal.         Thought Content: Thought content normal.         Judgment: Judgment normal.           ASSESSMENT/DIAGNOSIS     Diagnosis Orders   1.  DILEEP on CPAP     2. Obesity (BMI 30-39.9)     3. Other insomnia     4. Hypersomnia              Plan   Do you need any equipment today? Yes update supplies  - needs to consider new mattress, waking sore  - May try Tylenol for pain issues at night  - If insomnia persist after new mattress and Tylenol not effective- may take Klonopin at night.   - Need to improve sleep to improve hypersomnia  - Download reviewed and discussed with patient  - He  was advised to continue current positive airway pressure therapy with above described pressure. - He  advised to keep good compliance with current recommended pressure to get optimal results and clinical improvement  - Recommend 7-9 hours of sleep with PAP  - He was advised to call [employer] regarding supplies if needed.   -He call my office for earlier appointment if needed for worsening of sleep symptoms.   - He was instructed on weight loss  - Vahe Burnett was educated about my impression and plan. Patient verbalizesunderstanding.   We will see Bernarda Mcfadden back in: 4 months with download    Information added by my medical assistant/LPN was reviewed today         Genoveva Beltre PA-C, MPAS  1/11/2021       Total time for visit was 30 min

## 2021-02-01 ENCOUNTER — TELEPHONE (OUTPATIENT)
Dept: FAMILY MEDICINE CLINIC | Age: 61
End: 2021-02-01

## 2021-02-01 NOTE — TELEPHONE ENCOUNTER
[facility] received a call from:    Name of Caller: Tegan Roe    Relationship to patient: self    Organization name: [address]    Best contact number: [phone]    Reason for call: Pt calling in to update his pharmacy address. [facility] all [phone] [facility].  Nikita Riley

## 2021-03-25 ENCOUNTER — OFFICE VISIT (OUTPATIENT)
Dept: FAMILY MEDICINE CLINIC | Age: 61
End: 2021-03-25
Payer: COMMERCIAL

## 2021-03-25 VITALS
DIASTOLIC BLOOD PRESSURE: 70 MMHG | BODY MASS INDEX: 33.78 KG/M2 | HEART RATE: 68 BPM | WEIGHT: 235.4 LBS | SYSTOLIC BLOOD PRESSURE: 122 MMHG | TEMPERATURE: 97 F | RESPIRATION RATE: 16 BRPM

## 2021-03-25 DIAGNOSIS — I10 ESSENTIAL HYPERTENSION: Primary | ICD-10-CM

## 2021-03-25 DIAGNOSIS — R97.20 ELEVATED PSA: ICD-10-CM

## 2021-03-25 DIAGNOSIS — C67.4 MALIGNANT NEOPLASM OF POSTERIOR WALL OF URINARY BLADDER (HCC): ICD-10-CM

## 2021-03-25 DIAGNOSIS — Z12.5 SCREENING FOR PROSTATE CANCER: ICD-10-CM

## 2021-03-25 DIAGNOSIS — E78.00 HYPERCHOLESTEROLEMIA: ICD-10-CM

## 2021-03-25 DIAGNOSIS — F41.9 CHRONIC ANXIETY: ICD-10-CM

## 2021-03-25 DIAGNOSIS — N52.01 ERECTILE DYSFUNCTION DUE TO ARTERIAL INSUFFICIENCY: ICD-10-CM

## 2021-03-25 DIAGNOSIS — R73.01 IFG (IMPAIRED FASTING GLUCOSE): ICD-10-CM

## 2021-03-25 PROCEDURE — 99214 OFFICE O/P EST MOD 30 MIN: CPT | Performed by: FAMILY MEDICINE

## 2021-03-25 RX ORDER — CLONAZEPAM 0.5 MG/1
0.5 TABLET ORAL 2 TIMES DAILY PRN
Qty: 180 TABLET | Refills: 1 | Status: SHIPPED | OUTPATIENT
Start: 2021-03-25 | End: 2022-10-14

## 2021-03-25 ASSESSMENT — ENCOUNTER SYMPTOMS
RESPIRATORY NEGATIVE: 1
GASTROINTESTINAL NEGATIVE: 1

## 2021-03-25 NOTE — PROGRESS NOTES
3/25/2021    Sharath Ascencio (:  1960) is a 62 y.o. male, here for a preventive medicine evaluation. Chief Complaint   Patient presents with    6 Month Follow-Up    Hypertension     6 month eval.    BPs well controlled. BP Readings from Last 3 Encounters:   21 122/70   21 138/72   21 (!) 165/85     Hx of DILEEP, compliant with CPAP. Weight is down a few lbs. Wt Readings from Last 3 Encounters:   21 235 lb 6.4 oz (106.8 kg)   21 241 lb 12.8 oz (109.7 kg)   21 240 lb (108.9 kg)     Hx of chronic anxiety, controlled on the Klonopin. Hx of bladder cancer, following closely with Dr. Carla Castelan. Patient Active Problem List   Diagnosis    HTN (hypertension)    OA (osteoarthritis), multiple joints    ED (erectile dysfunction)    Chronic anxiety    Lumbar spinal stenosis    Depression    Post concussion syndrome    Fatigue    Abnormal laboratory test result, PSA mild elevated.  Medication monitoring encounter    Sciatica of right side, intermittent.  Hypercholesterolemia    Cerumen impaction    Obesity (BMI 30-39. 9)    IFG (impaired fasting glucose)    Cervical stenosis of spinal canal    Obstructive sleep apnea on CPAP    Muscle cramps    Dysfunction of right eustachian tube       Review of Systems   Constitutional: Negative. HENT: Negative. Respiratory: Negative. Cardiovascular: Negative. Gastrointestinal: Negative. Musculoskeletal: Negative. All other systems reviewed and are negative. Prior to Visit Medications    Medication Sig Taking? Authorizing Provider   clonazePAM (KLONOPIN) 0.5 MG tablet Take 1 tablet by mouth 2 times daily as needed for Anxiety for up to 180 days.  Yes Boubacar Camejo, DO   Multiple Vitamins-Minerals (MULTIVITAMIN MEN PO) Take by mouth Yes Historical Provider, MD   verapamil (CALAN SR) 180 MG extended release tablet TAKE 1 TABLET NIGHTLY Yes Thais Quiles MD   lisinopril (PRINIVIL;ZESTRIL) 20 MG tablet Take 1 tablet by mouth daily Yes Miryam Allen MD   ibuprofen (IBU) 800 MG tablet TAKE 1 TABLET EVERY 6 HOURS AS NEEDED Yes Miryam Allen MD   sildenafil (VIAGRA) 100 MG tablet Take 1 tablet by mouth as needed for Erectile Dysfunction Yes Miryam Allen MD   Red Yeast Rice 600 MG CAPS Take 1 capsule by mouth daily Take by mouth 2 times daily Yes Miryam Allen MD   Probiotic Product (PROBIOTIC DAILY PO) Take by mouth daily Yes Historical Provider, MD   fish oil-omega-3 fatty acids 1000 MG capsule Take 2 g by mouth. Two tablets twice a day Yes Historical Provider, MD   vitamin B-12 (CYANOCOBALAMIN) 100 MCG tablet Take 50 mcg by mouth daily. Yes Historical Provider, MD        No Known Allergies    Past Medical History:   Diagnosis Date    Bursitis     Cancer ([facility] [address] 11/2019    bladder cancer    Chest pain     Erectile dysfunction     Hyperlipidemia     Hypertension     Sleep apnea        Past Surgical History:   Procedure Laterality Date    CARDIAC CATHETERIZATION  6/30/2011    CYSTOSCOPY N/A 12/5/2019    CYSTOSCOPY TRANSURETHRAL RESECTION BLADDER TUMOR performed by Geraldine Haas MD at [address] [place] [address] N/A 11/7/2019    CYSTOSCOPY,  BLADDER BIOPSY,  TUR BLADDER TUMOR performed by Geraldine Haas MD at [address] [place] [address] History     Socioeconomic History    Marital status: Single     Spouse name: Not on file    Number of children: Not on file    Years of education: Not on file    Highest education level: Not on file   Occupational History    Not on file   Social Needs    Financial resource strain: Not on file    Food insecurity     Worry: Not on file     Inability: Not on file    Transportation needs     Medical: Not on file     Non-medical: Not on file   Tobacco Use    Smoking status: Never Smoker    Smokeless tobacco: Never Used   Substance and Sexual Activity    Alcohol use:  Yes     Alcohol/week: 0.0 standard drinks     Comment: daily 6 beers    Drug use: No    Sexual activity: Yes   Lifestyle    Physical activity     Days per week: Not on file     Minutes per session: Not on file    Stress: Not on file   Relationships    Social connections     Talks on phone: Not on file     Gets together: Not on file     Attends [employer] service: Not on file     Active member of club or organization: Not on file     Attends meetings of clubs or organizations: Not on file     Relationship status: Not on file    Intimate partner violence     Fear of current or ex partner: Not on file     Emotionally abused: Not on file     Physically abused: Not on file     Forced sexual activity: Not on file   Other Topics Concern    Not on file   Social History Narrative    Not on file        Family History   Problem Relation Age of Onset    Heart Disease Mother     Diabetes Mother     Heart Disease Father     Heart Surgery Father     Prostate Cancer Neg Hx        ADVANCE DIRECTIVE: N, <no information>    Vitals:    03/25/21 0742   BP: 122/70   Site: Left Upper Arm   Position: Sitting   Cuff Size: Large Adult   Pulse: 68   Resp: 16   Temp: 97 °F (36.1 °C)   TempSrc: Temporal   Weight: 235 lb 6.4 oz (106.8 kg)     Estimated body mass index is 33.78 kg/m² as calculated from the following:    Height as of 1/11/21: 5' 10\" (1.778 m). Weight as of this encounter: 235 lb 6.4 oz (106.8 kg). Physical Exam  Vitals signs and nursing note reviewed. Constitutional:       General: He is not in acute distress. Appearance: Normal appearance. He is well-developed. HENT:      Head: Normocephalic and atraumatic. Right Ear: Tympanic membrane normal.      Left Ear: Tympanic membrane normal.   Eyes:      Conjunctiva/sclera: Conjunctivae normal.   Neck:      Musculoskeletal: Neck supple. Cardiovascular:      Rate and Rhythm: Normal rate and regular rhythm. Heart sounds: Normal heart sounds. No murmur.    Pulmonary:      Effort: Pulmonary effort is normal.      Breath sounds: Normal breath sounds. No wheezing, rhonchi or rales. Abdominal:      General: There is no distension. Skin:     General: Skin is warm and dry. Findings: No rash (on exposed surfaces). Neurological:      General: No focal deficit present. Mental Status: He is alert. Psychiatric:         Attention and Perception: Attention normal.         Mood and Affect: Mood normal.         Speech: Speech normal.         Behavior: Behavior normal. Behavior is cooperative. Thought Content: Thought content normal.         Judgment: Judgment normal.         No flowsheet data found. Lab Results   Component Value Date    CHOL 193 03/26/2020    CHOL 223 03/22/2019    CHOL 176 03/08/2018    CHOL 220 03/09/2017    CHOL 206 03/03/2016    CHOL 206 02/20/2015    TRIG 55 03/26/2020    TRIG 73 03/22/2019    TRIG 55 03/08/2018    HDL 71 03/26/2020    HDL 60.6 03/22/2019    HDL 57 03/08/2018    LDLCALC 111 03/26/2020    LDLCALC 148 03/22/2019    LDLCALC 108 03/08/2018    GLUCOSE 91 03/26/2020    LABA1C 5.1 09/24/2019    LABA1C 5.4 03/08/2018    LABA1C 5.4 03/09/2017       The 10-year ASCVD risk score (Juve Mays, et al., 2013) is: 6.3%    Values used to calculate the score:      Age: 62 years      Sex: Male      Is Non- : No      Diabetic: No      Tobacco smoker: No      Systolic Blood Pressure: 703 mmHg      Is BP treated: Yes      HDL Cholesterol: 71 mg/dL      Total Cholesterol: 193 mg/dL      There is no immunization history on file for this patient.     Health Maintenance   Topic Date Due    Hepatitis C screen  Never done    HIV screen  Never done    COVID-19 Vaccine (1) Never done    DTaP/Tdap/Td vaccine (1 - Tdap) Never done    Shingles Vaccine (1 of 2) Never done    Flu vaccine (1) Never done    A1C test (Diabetic or Prediabetic)  09/24/2020    Potassium monitoring  03/26/2021    Creatinine monitoring  03/26/2021    Lipid screen 03/26/2025    Colon cancer screen colonoscopy  06/28/2026    Hepatitis A vaccine  Aged Out    Hepatitis B vaccine  Aged Out    Hib vaccine  Aged Out    Meningococcal (ACWY) vaccine  Aged Out    Pneumococcal 0-64 years Vaccine  Aged Out       ASSESSMENT/PLAN:  1. Essential hypertension  -     CBC Auto Differential; Future  2. Chronic anxiety  -     clonazePAM (KLONOPIN) 0.5 MG tablet; Take 1 tablet by mouth 2 times daily as needed for Anxiety for up to 180 days. , Disp-180 tablet, R-1Normal  3. Erectile dysfunction due to arterial insufficiency  4. IFG (impaired fasting glucose)  -     Comprehensive Metabolic Panel; Future  -     Hemoglobin A1C; Future  5. Hypercholesterolemia  -     Lipid Panel w/ Reflex Direct LDL; Future  -     Comprehensive Metabolic Panel; Future  -     TSH with Reflex; Future  6. Malignant neoplasm of posterior wall of urinary bladder (HCC)  7. Screening for prostate cancer  -     PSA screening; Future    -  Chronic medical problems stable  -  Continue current medications  -  Follow up with specialists as scheduled  -  Check labs, will call    Return in about 1 year (around 3/25/2022) for HTN. An electronic signature was used to authenticate this note.     --Ofelia Yu, DO on 3/25/2021 at 7:57 AM

## 2021-03-25 NOTE — PROGRESS NOTES
Chronic Disease Visit Information    BP Readings from Last 3 Encounters:   01/11/21 138/72   01/07/21 (!) 165/85   09/25/20 116/60          Hemoglobin A1C (%)   Date Value   09/24/2019 5.1   03/08/2018 5.4   03/09/2017 5.4     LDL Calculated (mg/dL)   Date Value   03/26/2020 111     HDL (mg/dL)   Date Value   03/26/2020 71     BUN (mg/dL)   Date Value   03/26/2020 19     CREATININE (mg/dL)   Date Value   03/26/2020 1.18     Glucose (mg/dL)   Date Value   03/26/2020 91            Have you changed or started any medications since your last visit including any over-the-counter medicines, vitamins, or herbal medicines? no   Are you having any side effects from any of your medications? -  no  Have you stopped taking any of your medications? Is so, why? -  no    Have you seen any other physician or provider since your last visit? Yes - Records Obtained  Have you had any other diagnostic tests since your last visit? No  Have you been seen in the emergency room and/or had an admission to a hospital since we last saw you? No  Have you had your annual diabetic retinal (eye) exam? No  Have you had your routine dental cleaning in the past 6 months? no    Have you activated your [employer] account? If not, what are your barriers?  Yes     Patient Care Team:  Coni Maldonado DO as PCP - General (Family Medicine)  Coni Maldonado DO as PCP - [facility]  Hector Myles MD as Consulting Physician (Urology)  Becca Sood RN as Nurse Navigator (Oncology)         Medical History Review  Past Medical, Family, and Social History reviewed and does contribute to the patient presenting condition    Health Maintenance   Topic Date Due    Hepatitis C screen  Never done    HIV screen  Never done    COVID-19 Vaccine (1) Never done    DTaP/Tdap/Td vaccine (1 - Tdap) Never done    Shingles Vaccine (1 of 2) Never done    Flu vaccine (1) Never done    A1C test (Diabetic or Prediabetic)  09/24/2020    Potassium monitoring  03/26/2021    Creatinine monitoring  03/26/2021    Lipid screen  03/26/2025    Colon cancer screen colonoscopy  06/28/2026    Hepatitis A vaccine  Aged Out    Hepatitis B vaccine  Aged Out    Hib vaccine  Aged Out    Meningococcal (ACWY) vaccine  Aged Out    Pneumococcal 0-64 years Vaccine  Aged Out

## 2021-03-26 LAB
ABSOLUTE BASO #: 0 X10E9/L (ref 0–0.2)
ABSOLUTE EOS #: 0 X10E9/L (ref 0–0.4)
ABSOLUTE LYMPH #: 1.2 X10E9/L (ref 1–3.5)
ABSOLUTE MONO #: 0.4 X10E9/L (ref 0–0.9)
ABSOLUTE NEUT #: 1.8 X10E9/L (ref 1.5–6.6)
ALBUMIN SERPL-MCNC: 4.3 G/DL (ref 3.2–5.3)
ALK PHOSPHATASE: 49 U/L (ref 39–130)
ALT SERPL-CCNC: 19 U/L (ref 0–40)
ANION GAP SERPL CALCULATED.3IONS-SCNC: 9 MMOL/L (ref 5–15)
AST SERPL-CCNC: 17 U/L (ref 0–41)
AVERAGE GLUCOSE: 103 MG/DL
BASOPHILS RELATIVE PERCENT: 0.8 %
BILIRUB SERPL-MCNC: 0.6 MG/DL (ref 0.3–1.2)
BUN BLDV-MCNC: 22 MG/DL (ref 5–23)
CALCIUM SERPL-MCNC: 9.8 MG/DL (ref 8.5–10.5)
CHLORIDE BLD-SCNC: 107 MMOL/L (ref 98–109)
CHOLESTEROL/HDL RATIO: 3.4 (ref 1–5)
CHOLESTEROL: 197 MG/DL (ref 150–200)
CO2: 27 MMOL/L (ref 22–32)
CREAT SERPL-MCNC: 0.99 MG/DL (ref 0.6–1.3)
EGFR AFRICAN AMERICAN: >60 ML/MIN/1.73SQ.M
EGFR IF NONAFRICAN AMERICAN: >60 ML/MIN/1.73SQ.M
EOSINOPHILS RELATIVE PERCENT: 1 %
GLUCOSE: 96 MG/DL (ref 65–99)
HBA1C MFR BLD: 5.2 % (ref 4.4–6.4)
HCT VFR BLD CALC: 42.7 % (ref 39–49)
HDLC SERPL-MCNC: 58 MG/DL
HEMOGLOBIN: 14.2 G/DL (ref 13–17)
LDL CHOLESTEROL CALCULATED: 130 MG/DL
LDL/HDL RATIO: 2.2
LYMPHOCYTE %: 34.7 %
MCH RBC QN AUTO: 33.4 PG (ref 27–34)
MCHC RBC AUTO-ENTMCNC: 33.2 G/DL (ref 32–36)
MCV RBC AUTO: 101 FL (ref 80–100)
MONOCYTES # BLD: 12.4 %
NEUTROPHILS RELATIVE PERCENT: 51.1 %
PDW BLD-RTO: 12.4 % (ref 11.5–15)
PLATELETS: 176 X10E9/L (ref 150–450)
PMV BLD AUTO: 9.2 FL (ref 7–12)
POTASSIUM SERPL-SCNC: 4.3 MMOL/L (ref 3.5–5)
PSA, ULTRASENSITIVE: 6.09 NG/ML (ref 0–4)
RBC: 4.24 X10E12/L (ref 4.1–5.7)
SODIUM BLD-SCNC: 143 MMOL/L (ref 134–146)
TOTAL PROTEIN: 6.9 G/DL (ref 6–8)
TRIGL SERPL-MCNC: 44 MG/DL (ref 27–150)
TSH SERPL DL<=0.05 MIU/L-ACNC: 1.54 UIU/ML (ref 0.49–4.67)
VLDLC SERPL CALC-MCNC: 9 MG/DL (ref 0–30)
WBC: 3.4 X10E9/L (ref 4–11)

## 2021-04-29 ENCOUNTER — HOSPITAL ENCOUNTER (OUTPATIENT)
Dept: UROLOGY | Age: 61
Discharge: HOME | End: 2021-04-29
Payer: COMMERCIAL

## 2021-04-29 VITALS
DIASTOLIC BLOOD PRESSURE: 74 MMHG | BODY MASS INDEX: 33.79 KG/M2 | WEIGHT: 236 LBS | HEART RATE: 59 BPM | HEIGHT: 70 IN | TEMPERATURE: 98.3 F | SYSTOLIC BLOOD PRESSURE: 149 MMHG | RESPIRATION RATE: 14 BRPM

## 2021-04-29 LAB
BILIRUBIN URINE: NEGATIVE
BLOOD URINE, POC: NEGATIVE
CHARACTER, URINE: CLEAR
COLOR, URINE: YELLOW
GLUCOSE URINE: NEGATIVE MG/DL
KETONES, URINE: ABNORMAL
LEUKOCYTE CLUMPS, URINE: NEGATIVE
NITRITE, URINE: NEGATIVE
PH, URINE: 6 (ref 5–9)
PROTEIN, URINE: NEGATIVE MG/DL
SPECIFIC GRAVITY, URINE: 1.02 (ref 1–1.03)
UROBILINOGEN, URINE: 1 EU/DL (ref 0–1)

## 2021-04-29 PROCEDURE — 52000 CYSTOURETHROSCOPY: CPT

## 2021-04-29 ASSESSMENT — PAIN - FUNCTIONAL ASSESSMENT: PAIN_FUNCTIONAL_ASSESSMENT: 0-10

## 2021-04-29 NOTE — PROGRESS NOTES
[facility]  This procedure has been fully reviewed with the patient and written informed consent has been obtained. [id number] Procedure started with Dr. Melanie Marks Procedure completed; patient tolerated well. Dr. Katelynn Griffith talked to patient in length about procedure findings. Patient discharged. PLAN: 6 month cystoscopy for bladder cancer surveillance.

## 2021-04-29 NOTE — OP NOTE
DATE:  4/29/2021  Lyle Hu  1960  [id number]     Surgeon: Dr. Alyson Hardwick M.D, MD  Preoperative diagnosis: history of non-invasive HG bladder TCC  Postoperative diagnosis: Same  Procedure: Flexible cystoscopy  Anesthesia: Local  Drains: None  Follow-up: Months for surveillance cystoscopy  EBL: 0 cc     Indications:  Lyle Hu is a 62 y.o. male with history of noninvasive high-grade bladder cancer. He is here for Cystoscopy. Risks benefits alternatives goals and possible complications of the procedure were explained to the patient and informed consent was obtained he elected to proceed.     Details: The patient was brought back to the operating room. He was laid in the supine position. His genitals were prepped and draped in usual sterile surgical fashion. 2 % lidocaine jelly was placed per the urethra for pain control. Flexible cystoscope was assembled and passed per the urethra. The anterior urethra was normal without any lesions; the posterior urethra and prostate were unremarkable. The bladder was inspected thoroughly and systematically,  which did not show any lesions or tumors, stone, fistulous tracts, diverticula. Both ureteral orifices were normal with clear efflux of urine. The patient tolerated the procedure well. The scope was removed intact and without any issues. This concluded the case. He was taken to recovery period and discharged home in stable condition. PLAN: 6 month cystoscopy for bladder cancer surveillance.

## 2021-07-12 ENCOUNTER — NURSE ONLY (OUTPATIENT)
Dept: LAB | Age: 61
End: 2021-07-12

## 2021-07-13 ENCOUNTER — PATIENT MESSAGE (OUTPATIENT)
Dept: FAMILY MEDICINE CLINIC | Age: 61
End: 2021-07-13

## 2021-07-13 DIAGNOSIS — I10 ESSENTIAL HYPERTENSION: ICD-10-CM

## 2021-07-13 RX ORDER — LISINOPRIL 20 MG/1
20 TABLET ORAL DAILY
Qty: 90 TABLET | Refills: 3 | Status: SHIPPED | OUTPATIENT
Start: 2021-07-13 | End: 2022-07-20 | Stop reason: SDUPTHER

## 2021-09-30 LAB
PROSTATE SPECIFIC ANTIGEN FREE: 1.59 NG/ML
PROSTATE SPECIFIC ANTIGEN PERCENT FREE: 22.4
PROSTATE SPECIFIC ANTIGEN: 7.1 NG/ML (ref 0–4)

## 2021-11-04 ENCOUNTER — HOSPITAL ENCOUNTER (OUTPATIENT)
Dept: UROLOGY | Age: 61
Discharge: HOME | End: 2021-11-04
Payer: COMMERCIAL

## 2021-11-04 ENCOUNTER — TELEPHONE (OUTPATIENT)
Dept: UROLOGY | Age: 61
End: 2021-11-04

## 2021-11-04 VITALS
WEIGHT: 238 LBS | TEMPERATURE: 98.4 F | HEART RATE: 51 BPM | SYSTOLIC BLOOD PRESSURE: 155 MMHG | BODY MASS INDEX: 34.15 KG/M2 | DIASTOLIC BLOOD PRESSURE: 79 MMHG | RESPIRATION RATE: 16 BRPM

## 2021-11-04 LAB
BILIRUBIN URINE: NEGATIVE
BLOOD URINE, POC: NEGATIVE
CHARACTER, URINE: CLEAR
COLOR, URINE: YELLOW
GLUCOSE URINE: NEGATIVE MG/DL
KETONES, URINE: NEGATIVE
LEUKOCYTE CLUMPS, URINE: NEGATIVE
NITRITE, URINE: NEGATIVE
PH, URINE: 6 (ref 5–9)
PROTEIN, URINE: NEGATIVE MG/DL
SPECIFIC GRAVITY, URINE: 1.01 (ref 1–1.03)
UROBILINOGEN, URINE: 0.2 EU/DL (ref 0–1)

## 2021-11-04 PROCEDURE — 52000 CYSTOURETHROSCOPY: CPT

## 2021-11-04 ASSESSMENT — PAIN - FUNCTIONAL ASSESSMENT: PAIN_FUNCTIONAL_ASSESSMENT: 0-10

## 2021-11-04 NOTE — TELEPHONE ENCOUNTER
Patient scheduled for MRI PROSTATE W WO  at [facility] on 12/3/21 ARRIVE OF 9:00 AM FOR A 9:30 AM SCAN TIME WITH NO PREP. Patient advised of instructions.   Order mailed/given to patient

## 2021-11-04 NOTE — PROGRESS NOTES
[facility] in [facility] for cystoscopy. This procedure has been fully reviewed with the patient and written informed consent has been obtained. 5550 Procedure started with Luis Handy  [id number] Procedure completed; patient tolerated well. Dr. Gunner Ochoa talked to patient in length about procedure findings. Patient discharged. PLAN repeat PSA lab work and schedule MRI of prostate. Schedule follow up appointment to go over results.

## 2021-11-04 NOTE — OP NOTE
Torito Careyalex MANAN Caicedo  1960  [id number]     Surgeon: Dr. Maryanne Watson M.D, MD  Preoperative diagnosis: history of non-invasive HG bladder TCC  Postoperative diagnosis: Same  Procedure: Flexible cystoscopy  Anesthesia: Local  Drains: None  Follow-up: Months for surveillance cystoscopy  EBL: 0 cc     Indications:  Larisa Caicedo is a 62 y. o. male with history of noninvasive high-grade bladder cancer. Jed Flaherty is here for Cystoscopy.  Risks benefits alternatives goals and possible complications of the procedure were explained to the patient and informed consent was obtained he elected to proceed.     Details: The patient was brought back to the operating room. Jed Flaherty was laid in the supine position.  His genitals were prepped and draped in usual sterile surgical fashion.  2 % lidocaine jelly was placed per the urethra for pain control.  Flexible cystoscope was assembled and passed per the urethra.  The anterior urethra was normal without any lesions; the posterior urethra and prostate were unremarkable.  The bladder was inspected thoroughly and systematically,  which did not show any lesions or tumors, stone, fistulous tracts, diverticula.  Both ureteral orifices were normal with clear efflux of urine.   The patient tolerated the procedure well.  The scope was removed intact and without any issues.  This concluded the case.  He was taken to recovery period and discharged home in stable condition.        Patient with a PSA and PSA rise from 4.9, to 6.09, now up to 7. Percent free PSA is elevated which is reassuring however the rise of the PSA is concerning over time. We will obtain prostate MRI and repeat blood work and follow-up in the office to discuss    He will need surveillance cystoscopy every 6 months.

## 2021-12-03 ENCOUNTER — HOSPITAL ENCOUNTER (OUTPATIENT)
Dept: MRI IMAGING | Age: 61
Discharge: HOME | End: 2021-12-03
Payer: COMMERCIAL

## 2021-12-03 DIAGNOSIS — Z85.51 PERSONAL HISTORY OF MALIGNANT NEOPLASM OF BLADDER: Primary | ICD-10-CM

## 2021-12-03 DIAGNOSIS — Z85.51 HISTORY OF BLADDER CANCER: Primary | ICD-10-CM

## 2021-12-03 LAB — POC CREATININE WHOLE BLOOD: 1 MG/DL (ref 0.5–1.2)

## 2021-12-03 PROCEDURE — 72197 MRI PELVIS W/O & W/DYE: CPT

## 2021-12-03 PROCEDURE — A9579 GAD-BASE MR CONTRAST NOS,1ML: HCPCS | Performed by: UROLOGY

## 2021-12-03 PROCEDURE — 6360000004 HC RX CONTRAST MEDICATION: Performed by: UROLOGY

## 2021-12-03 PROCEDURE — 82565 ASSAY OF CREATININE: CPT

## 2021-12-03 RX ADMIN — GADOTERIDOL 20 ML: 279.3 INJECTION, SOLUTION INTRAVENOUS at 11:01

## 2021-12-06 ENCOUNTER — PATIENT MESSAGE (OUTPATIENT)
Dept: UROLOGY | Age: 61
End: 2021-12-06

## 2021-12-06 LAB
PROSTATE SPECIFIC ANTIGEN FREE: 2.63 NG/ML
PROSTATE SPECIFIC ANTIGEN PERCENT FREE: 26.1 %
PSA-PROSTATE SPECIFIC AG: 10.06

## 2021-12-06 NOTE — TELEPHONE ENCOUNTER
From: Chelle Fregoso  To: Dr. Perez Needles: 12/6/2021 12:01 PM EST  Subject: PSA Prostatic Specific Antigen component results and MRI Prostate with & without contrast component results    Please release the results to me (I can pick them up in person if you like) so that I may do some research, that way I/we may talk intelligently on December 10, 2021.   Thank you in advance for your assistance during this ordeal.   Nuris Hendrickson

## 2021-12-10 ENCOUNTER — TELEPHONE (OUTPATIENT)
Dept: UROLOGY | Age: 61
End: 2021-12-10

## 2021-12-10 ENCOUNTER — OFFICE VISIT (OUTPATIENT)
Dept: UROLOGY | Age: 61
End: 2021-12-10
Payer: COMMERCIAL

## 2021-12-10 VITALS
WEIGHT: 238 LBS | BODY MASS INDEX: 34.07 KG/M2 | HEART RATE: 59 BPM | SYSTOLIC BLOOD PRESSURE: 140 MMHG | HEIGHT: 70 IN | DIASTOLIC BLOOD PRESSURE: 89 MMHG

## 2021-12-10 DIAGNOSIS — Z85.51 HISTORY OF BLADDER CANCER: Primary | ICD-10-CM

## 2021-12-10 DIAGNOSIS — R97.20 ELEVATED PSA: ICD-10-CM

## 2021-12-10 PROCEDURE — 99214 OFFICE O/P EST MOD 30 MIN: CPT | Performed by: UROLOGY

## 2021-12-10 RX ORDER — CIPROFLOXACIN 500 MG/1
500 TABLET, FILM COATED ORAL 2 TIMES DAILY
Qty: 6 TABLET | Refills: 0 | Status: SHIPPED | OUTPATIENT
Start: 2021-12-10 | End: 2021-12-13

## 2021-12-10 RX ORDER — GENTAMICIN SULFATE 40 MG/ML
80 INJECTION, SOLUTION INTRAMUSCULAR; INTRAVENOUS ONCE
Qty: 2 ML | Refills: 0 | Status: SHIPPED | OUTPATIENT
Start: 2021-12-10 | End: 2021-12-10

## 2021-12-10 NOTE — PROGRESS NOTES
Capri Farley MD   Urology Clinic Office visit      Patient:  Edward Madrid  YOB: 1960  Date: 12/10/2021    HISTORY OF PRESENT ILLNESS:   The patient is a 62 y.o. male who presents today for follow-up for the following problem(s):      1. History of bladder cancer    2. Elevated PSA           Overall the problem(s) : are worsening. Associated Symptoms: No dysuria, gross hematuria. Pain Severity:      Summary of old records: history of noninvasive high-grade bladder   Additional History:   Patient with a PSA and PSA rise from 4.9, to 6.09, now up to 7. Percent free PSA is elevated which is reassuring however the rise of the PSA is concerning over time. Continues to rise, over 10 now    I independently reviewed and verified the images and reports from:    MRI PROSTATE W WO CONTRAST    Result Date: 12/6/2021  PROCEDURE: MRI PROSTATE W WO CONTRAST CLINICAL INFORMATION: Elevated PSA COMPARISON: None TECHNIQUE: Axial T2, coronal T2 and sagittal T2 imaging of the prostate gland. Large field of view axial T2 imaging of the prostate gland. Axial T1, axial T1 VIBE dynamic post adán and axial T1 VIBE dynamic subtracted post adán images through the prostate gland. Diffusion 50, 800, 1400 and ADC maps through the prostate gland. Axial T1 STAR VIBE post adán through the pelvis. 3-D images reconstructed on a separate [employer] workstation with MRI TRUS fusion of prostate mass lesions. CONTRAST: 20 mL of ProHance LABORATORY: 1. PSA on 9/28/2021 was 7.10 2. PSA on 3/25/2021 was 6.09 3. PSA on 9/20/2019 was 4.92. FINDINGS: PROSTATE SIZE: 1. The prostate gland is mildly enlarged measuring 6 x 4.5 x 4.6 cm. 2. The prostate volume is 58.22 ml. TRANSITIONAL ZONE: 1. Heterogeneous signal and nodularity is noted. 2. A discrete nodule is seen that bridges the right transitional and peripheral zone with bulge of the prostatic capsule which bulges the prostatic capsule. This is further characterized below.  3. Lesion #: 1 Location: Left central zone zone at the base (series 4, image 9) Size: 1.1 x 1 cm T2: Moderately hypointense ADC: Mildly decreased DWI: Mildly hyperintense DCE: Positive Prostate margin: Has a broad-based relationship with the prostatic capsule. Extraprostatic extension is not excluded. Overall PI-RADS category: 4 /5 PERIPHERAL ZONE: 1. There are areas of diffuse T2 hypointensity that can relate to prior inflammation/infection. 2. Lesion #: 2 Location: At the junction of the anterior and posterolateral left peripheral zone at the base (series 4, image 10) Size: 1.7 x 1 cm T2: Mild to moderately hypointense ADC: Mildly hypointense DWI: Mildly hyperintense DCE: Positive Prostate margin: Does not abut the prostate margin Overall PI-RADS category: 4 /5 3. Lesion #: 3 Location: Encompasses the right anterior peripheral zone and anterior transitional zone. Size: 2.1 x 1 cm T2: Markedly T2 hypointense ADC: Markedly hypointense DWI: Markedly hyperintense DCE: Negative Prostate margin: Bulges the prostatic capsule Overall PI-RADS category: 5 /5 SEMINAL VESICLES: Unremarkable. NEUROVASCULAR BUNDLES: Not involved URINARY BLADDER/RECTUM/PELVIC DIAPHRAGM: The bladder is grossly unremarkable. Pelvic diaphragm is unremarkable. The rectum is unremarkable. Vera Gull PATHOLOGICALLY ENLARGED LYMPH NODES: 1. None. OSSEOUS STRUCTURES: 1. Unremarkable. OTHER: 1. Small fat-containing bilateral inguinal hernias. . A small fat-containing umbilical hernia. 1. A 2.1 x 1 cm discrete nodule that encompasses right anterior peripheral zone and anterior transitional zone at the base with bulging of the prostatic capsule has a PI-RADS assessment of 5. The bulging of the prostatic capsule is worrisome for extraprostatic extension. 2. A 1.1 x 1 cm abnormality in the left central zone at the base has a PI-RADS assessment of 4. A broad based relationship to the prostatic capsule is noted. Extraprostatic extension is not excluded.  3. A 1.7 x 1 cm abnormality at the junction of anterior and posterolateral left peripheral zone at the base has a PI-RADS assessment 4. 4. Mild prostatomegaly. 5. Small bilateral fat-containing inguinal hernias. PI-RADS v 2.1 assessment categories PI-RADS 1: Very low (clinically significant cancer is highly unlikely to be present) PI-RADS 2: Low (clinically significant cancer is unlikely to be present) PI-RADS 3: Intermediate (the presence of clinically significant cancer is equivocal) PI-RADS 4: High (clinically significant cancer is likely to be present) PI-RADS-5: Very high (clinically significant cancer is highly likely to be present) **This report has been created using voice recognition software. It may contain minor errors which are inherent in voice recognition technology. ** Final report electronically signed by Dr Guzman Anguiano on 12/6/2021 9:22 AM        Last several PSA's:  Lab Results   Component Value Date    PSA 10.06 12/06/2021    PSA 7.10 (H) 09/28/2021    PSA 3.09 (H) 10/22/2013       Last total testosterone:  No results found for: TESTOSTERONE    Urinalysis today:  No results found for this visit on 12/10/21.     Last BUN and creatinine:  Lab Results   Component Value Date    BUN 22 03/25/2021     Lab Results   Component Value Date    CREATININE 0.99 03/25/2021       Imaging Reviewed during this Office Visit:   (results were independently reviewed by physician and radiology report verified)    PAST MEDICAL, FAMILY AND SOCIAL HISTORY UPDATE:  Past Medical History:   Diagnosis Date    Bursitis     Cancer ([address] 11/2019    bladder cancer    Chest pain     Erectile dysfunction     Hyperlipidemia     Hypertension     Sleep apnea      Past Surgical History:   Procedure Laterality Date    CARDIAC CATHETERIZATION  6/30/2011    CYSTOSCOPY N/A 12/5/2019    CYSTOSCOPY TRANSURETHRAL RESECTION BLADDER TUMOR performed by Yoan Morel MD at [address] [place] [address] N/A 11/7/2019    CYSTOSCOPY, BLADDER BIOPSY,  TUR BLADDER TUMOR performed by Preston Coulter MD at [address] [facility] EXTRACTION       Family History   Problem Relation Age of Onset    Heart Disease Mother     Diabetes Mother     Heart Disease Father     Heart Surgery Father     Prostate Cancer Neg Hx      Outpatient Medications Marked as Taking for the 12/10/21 encounter (Office Visit) with Preston Coulter MD   Medication Sig Dispense Refill    ciprofloxacin (CIPRO) 500 MG tablet Take 1 tablet by mouth 2 times daily for 3 days Take 2 tablets day before biopsy, 2 tablets day of biopsy, and 2 tablets day after biopsy. 6 tablet 0    gentamicin (GARAMYCIN) 40 MG/ML injection Inject 2 mLs into the muscle once for 1 dose 2 mL 0    lisinopril (PRINIVIL;ZESTRIL) 20 MG tablet Take 1 tablet by mouth daily 90 tablet 3    verapamil (CALAN SR) 180 MG extended release tablet TAKE 1 TABLET NIGHTLY 90 tablet 3    clonazePAM (KLONOPIN) 0.5 MG tablet Take 1 tablet by mouth 2 times daily as needed for Anxiety for up to 180 days. 180 tablet 1    Multiple Vitamins-Minerals (MULTIVITAMIN MEN PO) Take by mouth      ibuprofen (IBU) 800 MG tablet TAKE 1 TABLET EVERY 6 HOURS AS NEEDED 360 tablet 3    sildenafil (VIAGRA) 100 MG tablet Take 1 tablet by mouth as needed for Erectile Dysfunction 18 tablet 3    Red Yeast Rice 600 MG CAPS Take 1 capsule by mouth daily Take by mouth 2 times daily 90 capsule 3    Probiotic Product (PROBIOTIC DAILY PO) Take by mouth daily      fish oil-omega-3 fatty acids 1000 MG capsule Take 2 g by mouth. Two tablets twice a day      vitamin B-12 (CYANOCOBALAMIN) 100 MCG tablet Take 50 mcg by mouth daily. Patient has no known allergies.   Social History     Tobacco Use   Smoking Status Never Smoker   Smokeless Tobacco Never Used       Social History     Substance and Sexual Activity   Alcohol Use Yes    Alcohol/week: 0.0 standard drinks    Comment: daily 6 beers       REVIEW OF SYSTEMS:  Constitutional:

## 2021-12-10 NOTE — TELEPHONE ENCOUNTER
NEEDED  2. Please eat a regular breakfast/lunch. 3.  Take your antibiotics as directed:  Cipro 500 mg twice a day, start on:   12/22/21    take until finished. 4.  Bring your Gentamicin 80 mg with you to your appointment. 5.  Do a Fleets Enema 2 hours before the visit. Purchase it at any drug store and follow the instructions on the package. 6. Please ensure to bring a  with you the day of the surgery to transport you home. HOME GOING AND FOLLOW UP INSTRUCTIONS  Call the doctor if: 1. There is a large amount of blood or clots in the urine or stool. 2.  You are unable to urinate. 3.  If your temperature is 101 degrees F or greater. 4.  You could see blood in your semen for up to 2-months            5.   You may resume your regular medication 3-days after procedure    DATE: 12/10/2021       SIGNATURE:________________________________

## 2021-12-23 ENCOUNTER — NURSE ONLY (OUTPATIENT)
Dept: LAB | Age: 61
End: 2021-12-23

## 2021-12-23 ENCOUNTER — PROCEDURE VISIT (OUTPATIENT)
Dept: UROLOGY | Age: 61
End: 2021-12-23
Payer: COMMERCIAL

## 2021-12-23 DIAGNOSIS — R97.20 ELEVATED PSA: Primary | ICD-10-CM

## 2021-12-23 PROCEDURE — 96372 THER/PROPH/DIAG INJ SC/IM: CPT | Performed by: UROLOGY

## 2021-12-23 PROCEDURE — 55700 PR BIOPSY OF PROSTATE,NEEDLE/PUNCH: CPT | Performed by: UROLOGY

## 2021-12-23 PROCEDURE — 76872 US TRANSRECTAL: CPT | Performed by: UROLOGY

## 2021-12-23 RX ORDER — GENTAMICIN SULFATE 40 MG/ML
80 INJECTION, SOLUTION INTRAMUSCULAR; INTRAVENOUS ONCE
Status: COMPLETED | OUTPATIENT
Start: 2021-12-23 | End: 2021-12-23

## 2021-12-23 RX ADMIN — GENTAMICIN SULFATE 80 MG: 40 INJECTION, SOLUTION INTRAMUSCULAR; INTRAVENOUS at 09:25

## 2021-12-23 NOTE — PROGRESS NOTES
Procedure: Trus/Biopsy-Uronav  Pt name and birth date verified Yes  Patient agrees Dr. Ivan Roque is taking biopsies of the prostate Yes  Patient took Enema 2 hours prior to procedure Yes  Patient took 2 pill(s) of cipro day before procedure, day of, and will the day after Yes  Has patient eaten today?   Yes-donut   Patient stopped all blood thinners prior to surgery Yes

## 2021-12-23 NOTE — PROGRESS NOTES
Patient has given me verbal consent to perform Gentamicin Injection  Yes    Following Dr. Coppola Number of care.   Gentamicin 80MG/2ML GIVEN I.M RIGHT UOQ HIP  Lot Number: [id number]  Expiration Date: 01/2023  [facility] [address] #: [id number]    Patient supplied their own medications Yes

## 2021-12-23 NOTE — PROGRESS NOTES
Mr. Gerard Whitten was seen in follow up for his prostate biopsy. The biopsy was indicated and is being performed for elevated PSA. The biopsy is being done with MRI / Ultrasound fusion using the UroNav system. The biopsy was done without difficulty or complication. TRUS prostate biopsy note:  After obtaining informed consent, the rectal ultrasound probe was passed per rectum and the prostate visualized. A local block was performed by instilling 2% lidocaine at the base. Measurements were taken and the prostate measured for a total volume of 50 cc. At this point, prostate biopsy was performed. Using [employer], the ultrasound and MRI images were fused and then biopsies of the lesions identified by the radiologist were taken. Three cores were taken from each of the 3 lesions seen on MRI. Two cores were then  taken at the base, the mid-portion, and the apex of the gland on either side for a total of 12 cores. The rectal probe was removed and there was minimal bleeding. Mr. Gerard Whitten tolerated the procedure well and there were no complications. Prostate size: 50 cc  Cores taken:6 + 3 cores each from 3 lesions making 15 total cores  Complications: none      Assessment:      Prostate biopsy performed without difficulty. This was done with UroNav MRI fused guidance. Plan:        I will see Grayson Wilkinson back to discuss the pathology in 1-2 weeks. Further recommendations will be based on these results.

## 2022-01-03 ENCOUNTER — OFFICE VISIT (OUTPATIENT)
Dept: UROLOGY | Age: 62
End: 2022-01-03
Payer: COMMERCIAL

## 2022-01-03 VITALS — BODY MASS INDEX: 34.36 KG/M2 | HEIGHT: 70 IN | WEIGHT: 240 LBS

## 2022-01-03 DIAGNOSIS — R97.20 ELEVATED PSA: Primary | ICD-10-CM

## 2022-01-03 DIAGNOSIS — Z85.51 HISTORY OF BLADDER CANCER: ICD-10-CM

## 2022-01-03 PROCEDURE — 99215 OFFICE O/P EST HI 40 MIN: CPT | Performed by: UROLOGY

## 2022-01-03 NOTE — PROGRESS NOTES
[facility] [address] [place] [id number]  Dept: [phone]  Dept Fax: [phone]: [address] [address] Urology Office Note      Patient:  Cherie Yeung  YOB: 1960    The patient is a 62 y.o. male who presents today for evaluation of the following problems:   Chief Complaint   Patient presents with    Follow-up     bx results        HISTORY OF PRESENT ILLNESS:     Prostate Cancer  New diagnosis  Here with biopsy results    FINAL DIAGNOSIS:   A: Right apex of prostate, core needle biopsy:             Negative for malignancy. B: Right mid prostate, core needle biopsy:             Negative for malignancy. C: Right base of prostate, core needle biopsy:             Negative for malignancy. D: Left apex of prostate, core needle biopsy:             Prostatic adenocarcinoma, acinar type, Aguila score 3+3 = 6   (grade group 1 of 5).           Carcinoma is present within 1 of 1 submitted core.  (11 mm /   20 mm; 55%)   E: Left mid prostate, core needle biopsy:             Prostatic adenocarcinoma, acinar type, Gideon score 3+3 = 6   (grade group 1 of 5).           Carcinoma is present within 1 of 1 submitted core.  (1 mm /   17 mm; 6%)   F: Left base of prostate, core needle biopsy:             Negative for malignancy. G: Prostate lesion #1, core needle biopsies:             Negative for malignancy. H: Prostate lesion #2, core needle biopsies:             Prostatic adenocarcinoma, acinar type, Gideon score 3+3 = 6   (grade group 1 of 5).           Carcinoma is present within 2 of 3 submitted cores.  (7 mm /   45 mm; 16%)   I: Prostate lesion #3, core needle biopsies:     Necrotizing granulomatous inflammation.     Prostatic parenchyma is not identified.     Negative for organisms and malignancy.          Bladder Cancer  High grade noninvasive  Gets routine cystoscopy    Requested/reviewed records from Maliha Hogue DO office and/or outside physician/[facility]    (Patient's old records have been requested, reviewed and pertinent findings summarized in today's note.)    Procedures Today: N/A    Last several PSA's:  Lab Results   Component Value Date    PSA 10.06 12/06/2021    PSA 7.10 (H) 09/28/2021    PSA 3.09 (H) 10/22/2013       Last total testosterone:  No results found for: TESTOSTERONE    Urinalysis today:  No results found for this visit on 01/03/22. Last BUN and creatinine:  Lab Results   Component Value Date    BUN 22 03/25/2021     Lab Results   Component Value Date    CREATININE 0.99 03/25/2021       Imaging Reviewed during this Office Visit:   Frances Barraza MD independently reviewed the images and verified the radiology reports from:    MRI PROSTATE W WO CONTRAST    Result Date: 12/6/2021  PROCEDURE: MRI PROSTATE W WO CONTRAST CLINICAL INFORMATION: Elevated PSA COMPARISON: None TECHNIQUE: Axial T2, coronal T2 and sagittal T2 imaging of the prostate gland. Large field of view axial T2 imaging of the prostate gland. Axial T1, axial T1 VIBE dynamic post adán and axial T1 VIBE dynamic subtracted post adán images through the prostate gland. Diffusion 50, 800, 1400 and ADC maps through the prostate gland. Axial T1 STAR VIBE post adán through the pelvis. 3-D images reconstructed on a separate [employer] workstation with MRI TRUS fusion of prostate mass lesions. CONTRAST: 20 mL of ProHance LABORATORY: 1. PSA on 9/28/2021 was 7.10 2. PSA on 3/25/2021 was 6.09 3. PSA on 9/20/2019 was 4.92. FINDINGS: PROSTATE SIZE: 1. The prostate gland is mildly enlarged measuring 6 x 4.5 x 4.6 cm. 2. The prostate volume is 58.22 ml. TRANSITIONAL ZONE: 1. Heterogeneous signal and nodularity is noted. 2. A discrete nodule is seen that bridges the right transitional and peripheral zone with bulge of the prostatic capsule which bulges the prostatic capsule.  This is further characterized below. 3. Lesion #: 1 Location: Left central zone zone at the base (series 4, image 9) Size: 1.1 x 1 cm T2: Moderately hypointense ADC: Mildly decreased DWI: Mildly hyperintense DCE: Positive Prostate margin: Has a broad-based relationship with the prostatic capsule. Extraprostatic extension is not excluded. Overall PI-RADS category: 4 /5 PERIPHERAL ZONE: 1. There are areas of diffuse T2 hypointensity that can relate to prior inflammation/infection. 2. Lesion #: 2 Location: At the junction of the anterior and posterolateral left peripheral zone at the base (series 4, image 10) Size: 1.7 x 1 cm T2: Mild to moderately hypointense ADC: Mildly hypointense DWI: Mildly hyperintense DCE: Positive Prostate margin: Does not abut the prostate margin Overall PI-RADS category: 4 /5 3. Lesion #: 3 Location: Encompasses the right anterior peripheral zone and anterior transitional zone. Size: 2.1 x 1 cm T2: Markedly T2 hypointense ADC: Markedly hypointense DWI: Markedly hyperintense DCE: Negative Prostate margin: Bulges the prostatic capsule Overall PI-RADS category: 5 /5 SEMINAL VESICLES: Unremarkable. NEUROVASCULAR BUNDLES: Not involved URINARY BLADDER/RECTUM/PELVIC DIAPHRAGM: The bladder is grossly unremarkable. Pelvic diaphragm is unremarkable. The rectum is unremarkable. Naveen Rich PATHOLOGICALLY ENLARGED LYMPH NODES: 1. None. OSSEOUS STRUCTURES: 1. Unremarkable. OTHER: 1. Small fat-containing bilateral inguinal hernias. . A small fat-containing umbilical hernia. 1. A 2.1 x 1 cm discrete nodule that encompasses right anterior peripheral zone and anterior transitional zone at the base with bulging of the prostatic capsule has a PI-RADS assessment of 5. The bulging of the prostatic capsule is worrisome for extraprostatic extension. 2. A 1.1 x 1 cm abnormality in the left central zone at the base has a PI-RADS assessment of 4. A broad based relationship to the prostatic capsule is noted. Extraprostatic extension is not excluded.  3. A 1.7 x 1 cm abnormality at the junction of anterior and posterolateral left peripheral zone at the base has a PI-RADS assessment 4. 4. Mild prostatomegaly. 5. Small bilateral fat-containing inguinal hernias. * PI-RADS v 2.1 assessment categories PI-RADS 1: Very low (clinically significant cancer is highly unlikely to be present) PI-RADS 2: Low (clinically significant cancer is unlikely to be present) PI-RADS 3: Intermediate (the presence of clinically significant cancer is equivocal) PI-RADS 4: High (clinically significant cancer is likely to be present) PI-RADS-5: Very high (clinically significant cancer is highly likely to be present) **This report has been created using voice recognition software. It may contain minor errors which are inherent in voice recognition technology. ** Final report electronically signed by Dr Sisi Dupont on 12/6/2021 9:22 AM      PAST MEDICAL, FAMILY AND SOCIAL HISTORY:  Past Medical History:   Diagnosis Date    Bursitis     Cancer ([address] 11/2019    bladder cancer    Chest pain     Erectile dysfunction     Hyperlipidemia     Hypertension     Sleep apnea      Past Surgical History:   Procedure Laterality Date    CARDIAC CATHETERIZATION  6/30/2011    CYSTOSCOPY N/A 12/5/2019    CYSTOSCOPY TRANSURETHRAL RESECTION BLADDER TUMOR performed by Hina Gilliam MD at [address] N/A 11/7/2019    CYSTOSCOPY,  BLADDER BIOPSY,  TUR BLADDER TUMOR performed by Hina Gilliam MD at [address] [facility]       Family History   Problem Relation Age of Onset    Heart Disease Mother     Diabetes Mother     Heart Disease Father     Heart Surgery Father     Prostate Cancer Neg Hx      No outpatient medications have been marked as taking for the 1/3/22 encounter (Office Visit) with Connor Soto MD.       Patient has no known allergies.   Social History     Tobacco Use   Smoking Status Never Smoker   Smokeless Tobacco Never Used      (If patient a smoker, smoking cessation counseling offered)   Social History     Substance and Sexual Activity   Alcohol Use Yes    Alcohol/week: 0.0 standard drinks    Comment: daily 6 beers       REVIEW OF SYSTEMS:  Constitutional: negative  Eyes: negative  Respiratory: negative  Cardiovascular: negative  Gastrointestinal: negative  Genitourinary: see HPI  Musculoskeletal: negative  Skin: negative   Neurological: negative  Hematological/Lymphatic: negative  Psychological: negative      Physical Exam:    This a 62 y.o. male  There were no vitals filed for this visit. Body mass index is 34.44 kg/m². Constitutional: Patient in no acute distress;         Assessment and Plan        1. Elevated PSA    2. History of bladder cancer               Plan:      Pt brought his mychart results into the office today. We reviewed this with him extensively. Discussed new diagnosis prostate cancer at length with patient  Bladder cancer- needs routine cystoscopies with bandara    F/u with decipher with mary. Leaning towards active surveillance      Prostate Cancer Treatment Options  I have discussed in great detail with the patient the natural history, diagnosis and treatment of prostate cancer. I explained the Aguila grading system as well as the   various treatments available for prostate cancer. I discussed the following options:    1. Watchful waiting / Active surveillance. I told that this approach was appropriate for older patients, patients with a life expectancy of 10 years or less and patients with low grade, low volume disease. This approach will require serial PORSHA's, PSA's and possibly repeat prostate biopsy to check for grade or stage progression. 2.  Hormonal Therapy. I discussed the role of hormonal therapy in the form of LHRH agonists or antagonists such as Lupron, etc. Or surgical castration in the form of bilateral orchiectomy.   The patient was told that this is primarily used in patients with nikolas or metastatic disease or in conjunction with radiation therapy. The side effects include hot flashes, fatigue, breast enlargement, diminished libido, ED and long term development of osteoporosis. The patient was told he will encouraged to take supplemental Calcium and Vitamin D to prevent the latter. 3.  Radiation Therapy in the form of external beam radiation (IMRT) or prostate brachytherapy. Patient told that IMRT is given 5 days a week for 7-8 weeks and the side effects include rectal and bladder injury (OAB), erectile dysfunction (ED) and incontinence. Long term the patient may experience hematuria and radiation cystitis. Brachytherapy is done as an outpatient procedure under general anesthesia and postop the patient may experience dysuria, urgency, frequency, urge incontinence, increasing obstructive voiding symptoms and to a lesser degree than IMRT, rectal radiation injury and ED. Patients may require concomitant hormonal therapy with IMRT depending on the grade and extent of cancer. The patient may require hormonal therapy to downsize the prostate gland prior to brachytherapy. 4.  Open or Robotic assisted laparoscopic radical prostatectomy. Patient told about the options of open vs. Robotic assisted laparoscopic prostatectomy with or without pelvic lymphadenectomy. I discussed the risks including infection, bleeding, the risks of anesthesia, DVT, PE, MI, stroke, death, rectal injury and urethral stricture/bladder neck contracture. I discussed the side effect of stress urinary incontinence which is temporary for most patients. I discussed the side effect of ED and the fact that it may take 6-18 months to recover this function. 5.  Cryotherapy. I discussed the fact that this treatment option has a 100% ED rate and is used primarily for radiation treatment failures. Prescriptions Ordered:  No orders of the defined types were placed in this encounter.      Orders Placed:  No orders of the defined types were placed in this encounter.            Parvin Holden MD

## 2022-01-04 ENCOUNTER — TELEPHONE (OUTPATIENT)
Dept: UROLOGY | Age: 62
End: 2022-01-04

## 2022-01-04 NOTE — TELEPHONE ENCOUNTER
Called gianni to prior [employer]. They sent me to [employer]. [employer] does not prior auth tissue samples. The company who does the testing does all of the prior auth. Explained this to Niall Howe. He stated to go ahead with the testing. This is being sent to [facility].

## 2022-01-13 RX ORDER — CIPROFLOXACIN 500 MG/1
TABLET, FILM COATED ORAL
Qty: 6 TABLET | Refills: 0 | OUTPATIENT
Start: 2022-01-13

## 2022-02-07 ENCOUNTER — PATIENT MESSAGE (OUTPATIENT)
Dept: UROLOGY | Age: 62
End: 2022-02-07

## 2022-02-07 NOTE — TELEPHONE ENCOUNTER
Please see my chart message and advise. Thank you. Patient advised they may get a copy of the results from office, may call the company regarding the cost, and results will discussed at the appointment.

## 2022-02-07 NOTE — TELEPHONE ENCOUNTER
From: Loli Doran  To: Dr. Babs Ruiz: 2/7/2022 10:17 AM EST  Subject: decipher prostate cancer     After receiving a reminder of an upcoming appointment, I see by my test results tab that there are results (I received no notification) from the decipher test. My Chart advises \"This result cannot be displayed in My Chart. To get the result contact the ordering provider. \"  [employer] isn't approving [address] testing payment, stating the testing is Experimental/Investigational. This means I'm expected to self pay. I can file a 'timely' appeal, but without the insight of my results, my appeal will be based upon emotions, not science. I'm expected to pay for a service (testing), but not to receive the outcome? Further, it's futile to keep the appointment with Dr. Sri Maldonado since I cannot do research on the results. During the consultation, he might as well be speaking a foreign language, as it will make zero sense without information. When it comes to health issues, I have no use for the mentality \"I know something you don't. \" One may jump to the conclusion that I have 'trust' issues. I beg to differ, my philosophy is 'trust, but verify. Knowledge brings power and understanding.'   If there is someone whose health will benefit with an expedited appointment, please allow them to utilize the time allotted to me. Dr. Sri Maldonado can leave a voicemail, resulting in the same consequences.

## 2022-02-08 NOTE — TELEPHONE ENCOUNTER
After speaking to Benny Vasquez MA, patient may also voice concerns at the follow up appointment on Friday. Appointment was scheduled to discuss results.

## 2022-02-11 ENCOUNTER — OFFICE VISIT (OUTPATIENT)
Dept: UROLOGY | Age: 62
End: 2022-02-11
Payer: COMMERCIAL

## 2022-02-11 VITALS
BODY MASS INDEX: 34.22 KG/M2 | SYSTOLIC BLOOD PRESSURE: 162 MMHG | WEIGHT: 239 LBS | HEIGHT: 70 IN | DIASTOLIC BLOOD PRESSURE: 80 MMHG

## 2022-02-11 DIAGNOSIS — C61 PROSTATE CANCER (HCC): ICD-10-CM

## 2022-02-11 DIAGNOSIS — Z85.51 HISTORY OF BLADDER CANCER: Primary | ICD-10-CM

## 2022-02-11 DIAGNOSIS — R97.20 ELEVATED PSA: ICD-10-CM

## 2022-02-11 PROCEDURE — 99214 OFFICE O/P EST MOD 30 MIN: CPT | Performed by: UROLOGY

## 2022-02-11 NOTE — PROGRESS NOTES
MD Deniz Marshall M.D [address] [facility] [address]  Dept: [phone]  Dept Fax: [phone]: [address] [address] Urology Office Note      Patient:  Rehana Jarrett  YOB: 1960    The patient is a 62 y.o. male who presents today for evaluation of the following problems:   Chief Complaint   Patient presents with    Follow-up     discuss decipher         HISTORY OF PRESENT ILLNESS:     Prostate Cancer  New diagnosis      FINAL DIAGNOSIS:   A: Right apex of prostate, core needle biopsy:             Negative for malignancy. B: Right mid prostate, core needle biopsy:             Negative for malignancy. C: Right base of prostate, core needle biopsy:             Negative for malignancy. D: Left apex of prostate, core needle biopsy:             Prostatic adenocarcinoma, acinar type, [place] score 3+3 = 6   (grade group 1 of 5).           Carcinoma is present within 1 of 1 submitted core.  (11 mm /   20 mm; 55%)   E: Left mid prostate, core needle biopsy:             Prostatic adenocarcinoma, acinar type, [place] score 3+3 = 6   (grade group 1 of 5).           Carcinoma is present within 1 of 1 submitted core.  (1 mm /   17 mm; 6%)   F: Left base of prostate, core needle biopsy:             Negative for malignancy. G: Prostate lesion #1, core needle biopsies:             Negative for malignancy. H: Prostate lesion #2, core needle biopsies:             Prostatic adenocarcinoma, acinar type, [place] score 3+3 = 6   (grade group 1 of 5).           Carcinoma is present within 2 of 3 submitted cores.  (7 mm /   45 mm; 16%)   I: Prostate lesion #3, core needle biopsies:     Necrotizing granulomatous inflammation.     Prostatic parenchyma is not identified.     Negative for organisms and malignancy.      Prostate size: 50 cc  9/28/2021   PSA, Free Pct  22.4      12/2021 %free PSA 26    Bladder Cancer  High grade noninvasive  Gets routine cystoscopy    Requested/reviewed records from Angelique Apodaca DO office and/or outside physician/[facility]    (Patient's old records have been requested, reviewed and pertinent findings summarized in today's note.)    Procedures Today: N/A    Last several PSA's:  Lab Results   Component Value Date    PSA 10.06 12/06/2021    PSA 7.10 (H) 09/28/2021    PSA 3.09 (H) 10/22/2013       Last total testosterone:  No results found for: TESTOSTERONE    Urinalysis today:  No results found for this visit on 02/11/22. Last BUN and creatinine:  Lab Results   Component Value Date    BUN 22 03/25/2021     Lab Results   Component Value Date    CREATININE 0.99 03/25/2021       Imaging Reviewed during this Office Visit:   Andrew Lr M.D, MD independently reviewed the images and verified the radiology reports from:    MRI PROSTATE W WO CONTRAST    Result Date: 12/6/2021  PROCEDURE: MRI PROSTATE W WO CONTRAST CLINICAL INFORMATION: Elevated PSA COMPARISON: None TECHNIQUE: Axial T2, coronal T2 and sagittal T2 imaging of the prostate gland. Large field of view axial T2 imaging of the prostate gland. Axial T1, axial T1 VIBE dynamic post adán and axial T1 VIBE dynamic subtracted post adán images through the prostate gland. Diffusion 50, 800, 1400 and ADC maps through the prostate gland. Axial T1 STAR VIBE post adán through the pelvis. 3-D images reconstructed on a separate [employer] workstation with MRI TRUS fusion of prostate mass lesions. CONTRAST: 20 mL of ProHance LABORATORY: 1. PSA on 9/28/2021 was 7.10 2. PSA on 3/25/2021 was 6.09 3. PSA on 9/20/2019 was 4.92. FINDINGS: PROSTATE SIZE: 1. The prostate gland is mildly enlarged measuring 6 x 4.5 x 4.6 cm. 2. The prostate volume is 58.22 ml. TRANSITIONAL ZONE: 1. Heterogeneous signal and nodularity is noted.  2. A discrete nodule is seen that bridges the right transitional and peripheral zone with bulge of the prostatic capsule which bulges the prostatic capsule. This is further characterized below. 3. Lesion #: 1 Location: Left central zone zone at the base (series 4, image 9) Size: 1.1 x 1 cm T2: Moderately hypointense ADC: Mildly decreased DWI: Mildly hyperintense DCE: Positive Prostate margin: Has a broad-based relationship with the prostatic capsule. Extraprostatic extension is not excluded. Overall PI-RADS category: 4 /5 PERIPHERAL ZONE: 1. There are areas of diffuse T2 hypointensity that can relate to prior inflammation/infection. 2. Lesion #: 2 Location: At the junction of the anterior and posterolateral left peripheral zone at the base (series 4, image 10) Size: 1.7 x 1 cm T2: Mild to moderately hypointense ADC: Mildly hypointense DWI: Mildly hyperintense DCE: Positive Prostate margin: Does not abut the prostate margin Overall PI-RADS category: 4 /5 3. Lesion #: 3 Location: Encompasses the right anterior peripheral zone and anterior transitional zone. Size: 2.1 x 1 cm T2: Markedly T2 hypointense ADC: Markedly hypointense DWI: Markedly hyperintense DCE: Negative Prostate margin: Bulges the prostatic capsule Overall PI-RADS category: 5 /5 SEMINAL VESICLES: Unremarkable. NEUROVASCULAR BUNDLES: Not involved URINARY BLADDER/RECTUM/PELVIC DIAPHRAGM: The bladder is grossly unremarkable. Pelvic diaphragm is unremarkable. The rectum is unremarkable. [place] Case PATHOLOGICALLY ENLARGED LYMPH NODES: 1. None. OSSEOUS STRUCTURES: 1. Unremarkable. OTHER: 1. Small fat-containing bilateral inguinal hernias. . A small fat-containing umbilical hernia. 1. A 2.1 x 1 cm discrete nodule that encompasses right anterior peripheral zone and anterior transitional zone at the base with bulging of the prostatic capsule has a PI-RADS assessment of 5. The bulging of the prostatic capsule is worrisome for extraprostatic extension. 2. A 1.1 x 1 cm abnormality in the left central zone at the base has a PI-RADS assessment of 4.  A broad based relationship to the prostatic capsule is noted. Extraprostatic extension is not excluded. 3. A 1.7 x 1 cm abnormality at the junction of anterior and posterolateral left peripheral zone at the base has a PI-RADS assessment 4. 4. Mild prostatomegaly. 5. Small bilateral fat-containing inguinal hernias. * PI-RADS v 2.1 assessment categories PI-RADS 1: Very low (clinically significant cancer is highly unlikely to be present) PI-RADS 2: Low (clinically significant cancer is unlikely to be present) PI-RADS 3: Intermediate (the presence of clinically significant cancer is equivocal) PI-RADS 4: High (clinically significant cancer is likely to be present) PI-RADS-5: Very high (clinically significant cancer is highly likely to be present) **This report has been created using voice recognition software. It may contain minor errors which are inherent in voice recognition technology. ** Final report electronically signed by Dr Leelee Stovall on 12/6/2021 9:22 AM      PAST MEDICAL, FAMILY AND SOCIAL HISTORY:  Past Medical History:   Diagnosis Date    Bursitis     Cancer ([address] 11/2019    bladder cancer    Chest pain     Erectile dysfunction     Hyperlipidemia     Hypertension     Sleep apnea      Past Surgical History:   Procedure Laterality Date    CARDIAC CATHETERIZATION  6/30/2011    CYSTOSCOPY N/A 12/5/2019    CYSTOSCOPY TRANSURETHRAL RESECTION BLADDER TUMOR performed by Agueda Jordan MD at [address] [facility]"" N/A 11/7/2019    CYSTOSCOPY,  BLADDER BIOPSY,  TUR BLADDER TUMOR performed by Agueda Jordan MD at [facility]       Family History   Problem Relation Age of Onset    Heart Disease Mother     Diabetes Mother     Heart Disease Father     Heart Surgery Father     Prostate Cancer Neg Hx      No outpatient medications have been marked as taking for the 2/11/22 encounter (Office Visit) with Agueda Jordan MD.       Patient has no known allergies.   Social History Tobacco Use   Smoking Status Never Smoker   Smokeless Tobacco Never Used      (If patient a smoker, smoking cessation counseling offered)   Social History     Substance and Sexual Activity   Alcohol Use Yes    Alcohol/week: 0.0 standard drinks    Comment: daily 6 beers       REVIEW OF SYSTEMS:  Constitutional: negative  Eyes: negative  Respiratory: negative  Cardiovascular: negative  Gastrointestinal: negative  Genitourinary: see HPI  Musculoskeletal: negative  Skin: negative   Neurological: negative  Hematological/Lymphatic: negative  Psychological: negative      Physical Exam:    This a 62 y.o. male  Vitals:    02/11/22 0931   BP: (!) 162/80     Body mass index is 34.29 kg/m². Constitutional: Patient in no acute distress;         Assessment and Plan        1. History of bladder cancer    2. Elevated PSA    3. Prostate cancer [facility])               Plan:      Pt brought his mychart results into the office today. We reviewed this with him extensively. Discussed new diagnosis prostate cancer at length with patient  Bladder cancer- needs routine cystoscopies, next one due in 5/2022    Decipher came back as low risk of mets, mortality, adverse pathology  Discussed options again  He is leaning towards AS  Will check PSA 1 month from now  Discussed may need confirmation saturation biopsy in future              Prescriptions Ordered:  No orders of the defined types were placed in this encounter.      Orders Placed:  Orders Placed This Encounter   Procedures    PSA, Diagnostic     Standing Status:   Future     Standing Expiration Date:   2/11/2023            Riley Ramos M.D, MD

## 2022-03-21 ENCOUNTER — PATIENT MESSAGE (OUTPATIENT)
Dept: FAMILY MEDICINE CLINIC | Age: 62
End: 2022-03-21

## 2022-03-21 DIAGNOSIS — I10 ESSENTIAL HYPERTENSION: ICD-10-CM

## 2022-03-21 DIAGNOSIS — R73.01 IFG (IMPAIRED FASTING GLUCOSE): Primary | ICD-10-CM

## 2022-03-21 DIAGNOSIS — E78.00 HYPERCHOLESTEROLEMIA: ICD-10-CM

## 2022-03-24 LAB
ABSOLUTE BASO #: 0 X10E9/L (ref 0–0.2)
ABSOLUTE EOS #: 0 X10E9/L (ref 0–0.4)
ABSOLUTE LYMPH #: 1.3 X10E9/L (ref 1–3.5)
ABSOLUTE MONO #: 0.4 X10E9/L (ref 0–0.9)
ABSOLUTE NEUT #: 2.2 X10E9/L (ref 1.5–6.6)
ALBUMIN SERPL-MCNC: 4.4 G/DL (ref 3.2–5.3)
ALK PHOSPHATASE: 46 U/L (ref 39–130)
ALT SERPL-CCNC: 19 U/L (ref 0–40)
ANION GAP SERPL CALCULATED.3IONS-SCNC: 8 MMOL/L (ref 5–15)
AST SERPL-CCNC: 16 U/L (ref 0–41)
AVERAGE GLUCOSE: 114 MG/DL
BASOPHILS RELATIVE PERCENT: 0.5 %
BILIRUB SERPL-MCNC: 0.4 MG/DL (ref 0.3–1.2)
BUN BLDV-MCNC: 21 MG/DL (ref 5–23)
CALCIUM SERPL-MCNC: 9.2 MG/DL (ref 8.5–10.5)
CHLORIDE BLD-SCNC: 105 MMOL/L (ref 98–109)
CHOLESTEROL/HDL RATIO: 3.1 (ref 1–5)
CHOLESTEROL: 177 MG/DL (ref 150–200)
CO2: 27 MMOL/L (ref 22–32)
CREAT SERPL-MCNC: 0.98 MG/DL (ref 0.6–1.3)
EGFR AFRICAN AMERICAN: >60 ML/MIN/1.73SQ.M
EGFR IF NONAFRICAN AMERICAN: >60 ML/MIN/1.73SQ.M
EOSINOPHILS RELATIVE PERCENT: 1 %
GLUCOSE: 94 MG/DL (ref 65–99)
HBA1C MFR BLD: 5.6 % (ref 4.4–6.4)
HCT VFR BLD CALC: 41.9 % (ref 39–49)
HDLC SERPL-MCNC: 58 MG/DL
HEMOGLOBIN: 14 G/DL (ref 13–17)
LDL CHOLESTEROL CALCULATED: 105 MG/DL
LDL/HDL RATIO: 1.8
LYMPHOCYTE %: 33.7 %
MCH RBC QN AUTO: 33.4 PG (ref 27–34)
MCHC RBC AUTO-ENTMCNC: 33.4 G/DL (ref 32–36)
MCV RBC AUTO: 100 FL (ref 80–100)
MONOCYTES # BLD: 9.1 %
NEUTROPHILS RELATIVE PERCENT: 55.7 %
PDW BLD-RTO: 12.3 % (ref 11.5–15)
PLATELETS: 161 X10E9/L (ref 150–450)
PMV BLD AUTO: 9.7 FL (ref 7–12)
POTASSIUM SERPL-SCNC: 4.7 MMOL/L (ref 3.5–5)
PROSTATE SPECIFIC ANTIGEN: 10.67 NG/ML
RBC: 4.19 X10E12/L (ref 4.1–5.7)
SODIUM BLD-SCNC: 140 MMOL/L (ref 134–146)
TOTAL PROTEIN: 7 G/DL (ref 6–8)
TRIGL SERPL-MCNC: 69 MG/DL (ref 27–150)
TSH SERPL DL<=0.05 MIU/L-ACNC: 1.76 UIU/ML (ref 0.49–4.67)
VLDLC SERPL CALC-MCNC: 14 MG/DL (ref 0–30)
WBC: 4 X10E9/L (ref 4–11)

## 2022-03-28 ENCOUNTER — OFFICE VISIT (OUTPATIENT)
Dept: FAMILY MEDICINE CLINIC | Age: 62
End: 2022-03-28
Payer: COMMERCIAL

## 2022-03-28 VITALS
WEIGHT: 247.4 LBS | RESPIRATION RATE: 20 BRPM | DIASTOLIC BLOOD PRESSURE: 78 MMHG | BODY MASS INDEX: 35.5 KG/M2 | SYSTOLIC BLOOD PRESSURE: 146 MMHG | HEART RATE: 56 BPM

## 2022-03-28 DIAGNOSIS — I10 ESSENTIAL HYPERTENSION: ICD-10-CM

## 2022-03-28 DIAGNOSIS — F41.9 CHRONIC ANXIETY: ICD-10-CM

## 2022-03-28 DIAGNOSIS — E78.00 HYPERCHOLESTEROLEMIA: ICD-10-CM

## 2022-03-28 DIAGNOSIS — C67.4 MALIGNANT NEOPLASM OF POSTERIOR WALL OF URINARY BLADDER (HCC): ICD-10-CM

## 2022-03-28 DIAGNOSIS — Z00.00 WELL ADULT HEALTH CHECK: Primary | ICD-10-CM

## 2022-03-28 DIAGNOSIS — C61 PROSTATE CANCER (HCC): ICD-10-CM

## 2022-03-28 DIAGNOSIS — R73.01 IFG (IMPAIRED FASTING GLUCOSE): ICD-10-CM

## 2022-03-28 PROCEDURE — 99396 PREV VISIT EST AGE 40-64: CPT | Performed by: FAMILY MEDICINE

## 2022-03-28 SDOH — ECONOMIC STABILITY: FOOD INSECURITY: WITHIN THE PAST 12 MONTHS, YOU WORRIED THAT YOUR FOOD WOULD RUN OUT BEFORE YOU GOT MONEY TO BUY MORE.: NEVER TRUE

## 2022-03-28 SDOH — ECONOMIC STABILITY: FOOD INSECURITY: WITHIN THE PAST 12 MONTHS, THE FOOD YOU BOUGHT JUST DIDN'T LAST AND YOU DIDN'T HAVE MONEY TO GET MORE.: NEVER TRUE

## 2022-03-28 ASSESSMENT — PATIENT HEALTH QUESTIONNAIRE - PHQ9
4. FEELING TIRED OR HAVING LITTLE ENERGY: 0
SUM OF ALL RESPONSES TO PHQ QUESTIONS 1-9: 9
SUM OF ALL RESPONSES TO PHQ QUESTIONS 1-9: 9
7. TROUBLE CONCENTRATING ON THINGS, SUCH AS READING THE NEWSPAPER OR WATCHING TELEVISION: 2
SUM OF ALL RESPONSES TO PHQ QUESTIONS 1-9: 9
10. IF YOU CHECKED OFF ANY PROBLEMS, HOW DIFFICULT HAVE THESE PROBLEMS MADE IT FOR YOU TO DO YOUR WORK, TAKE CARE OF THINGS AT HOME, OR GET ALONG WITH OTHER PEOPLE: 0
1. LITTLE INTEREST OR PLEASURE IN DOING THINGS: 0
6. FEELING BAD ABOUT YOURSELF - OR THAT YOU ARE A FAILURE OR HAVE LET YOURSELF OR YOUR FAMILY DOWN: 1
2. FEELING DOWN, DEPRESSED OR HOPELESS: 3
SUM OF ALL RESPONSES TO PHQ9 QUESTIONS 1 & 2: 3
8. MOVING OR SPEAKING SO SLOWLY THAT OTHER PEOPLE COULD HAVE NOTICED. OR THE OPPOSITE, BEING SO FIGETY OR RESTLESS THAT YOU HAVE BEEN MOVING AROUND A LOT MORE THAN USUAL: 0
9. THOUGHTS THAT YOU WOULD BE BETTER OFF DEAD, OR OF HURTING YOURSELF: 0
5. POOR APPETITE OR OVEREATING: 3
3. TROUBLE FALLING OR STAYING ASLEEP: 0
SUM OF ALL RESPONSES TO PHQ QUESTIONS 1-9: 9

## 2022-03-28 ASSESSMENT — ENCOUNTER SYMPTOMS
GASTROINTESTINAL NEGATIVE: 1
RESPIRATORY NEGATIVE: 1

## 2022-03-28 ASSESSMENT — SOCIAL DETERMINANTS OF HEALTH (SDOH): HOW HARD IS IT FOR YOU TO PAY FOR THE VERY BASICS LIKE FOOD, HOUSING, MEDICAL CARE, AND HEATING?: NOT HARD AT ALL

## 2022-03-28 NOTE — PROGRESS NOTES
3/28/2022    Fauzia Gay (:  1960) is a 62 y.o. male, here for a preventive medicine evaluation. Chief Complaint   Patient presents with    Annual Exam     Annual eval.  Doing well overall. BP mildly elevated. BP Readings from Last 3 Encounters:   22 (!) 146/78   22 (!) 162/80   12/10/21 (!) 140/89     Weight is up. Wt Readings from Last 3 Encounters:   22 247 lb 6.4 oz (112.2 kg)   22 239 lb (108.4 kg)   22 240 lb (108.9 kg)         Patient Active Problem List   Diagnosis    HTN (hypertension)    OA (osteoarthritis), multiple joints    ED (erectile dysfunction)    Chronic anxiety    Lumbar spinal stenosis    Depression    Post concussion syndrome    Fatigue    Abnormal laboratory test result, PSA mild elevated.  Medication monitoring encounter    Sciatica of right side, intermittent.  Hypercholesterolemia    Cerumen impaction    Obesity (BMI 30-39. 9)    IFG (impaired fasting glucose)    Cervical stenosis of spinal canal    Obstructive sleep apnea on CPAP    Muscle cramps    Dysfunction of right eustachian tube       Review of Systems   Constitutional: Negative. HENT: Negative. Respiratory: Negative. Cardiovascular: Negative. Gastrointestinal: Negative. Musculoskeletal: Negative. All other systems reviewed and are negative. Prior to Visit Medications    Medication Sig Taking?  Authorizing Provider   lisinopril (PRINIVIL;ZESTRIL) 20 MG tablet Take 1 tablet by mouth daily Yes Jory Lynch DO   verapamil (CALAN SR) 180 MG extended release tablet TAKE 1 TABLET NIGHTLY Yes Jory Lynch DO   Multiple Vitamins-Minerals (MULTIVITAMIN MEN PO) Take by mouth Yes Historical Provider, MD   ibuprofen (IBU) 800 MG tablet TAKE 1 TABLET EVERY 6 HOURS AS NEEDED Yes Mike Sampson MD   sildenafil (VIAGRA) 100 MG tablet Take 1 tablet by mouth as needed for Erectile Dysfunction Yes Mike Sampson MD   Red Yeast Rice all   Food Insecurity: No Food Insecurity    Worried About Running Out of Food in the Last Year: Never true    Ran Out of Food in the Last Year: Never true   Transportation Needs:     Lack of Transportation (Medical): Not on file    Lack of Transportation (Non-Medical): Not on file   Physical Activity:     Days of Exercise per Week: Not on file    Minutes of Exercise per Session: Not on file   Stress:     Feeling of Stress : Not on file   Social Connections:     Frequency of Communication with Friends and Family: Not on file    Frequency of Social Gatherings with Friends and Family: Not on file    Attends Synagogue Services: Not on file    Active Member of [address] [place] [address] [employer] or Organizations: Not on file    Attends Club or Organization Meetings: Not on file    Marital Status: Not on file   Intimate Partner Violence:     Fear of Current or Ex-Partner: Not on file    Emotionally Abused: Not on file    Physically Abused: Not on file    Sexually Abused: Not on file   Housing Stability:     Unable to Pay for Housing in the Last Year: Not on file    Number of [place] in the Last Year: Not on file    Unstable Housing in the Last Year: Not on file        Family History   Problem Relation Age of Onset    Heart Disease Mother     Diabetes Mother     Heart Disease Father     Heart Surgery Father     Prostate Cancer Neg Hx        ADVANCE DIRECTIVE: N, <no information>    Vitals:    03/28/22 0746 03/28/22 0749   BP: (!) 144/82 (!) 146/78   Site: Left Upper Arm Left Upper Arm   Position: Sitting Sitting   Cuff Size: Large Adult Large Adult   Pulse: 56    Resp: 20    Weight: 247 lb 6.4 oz (112.2 kg)      Estimated body mass index is 35.5 kg/m² as calculated from the following:    Height as of 2/11/22: 5' 10\" (1.778 m). Weight as of this encounter: 247 lb 6.4 oz (112.2 kg). Physical Exam  Vitals and nursing note reviewed. Constitutional:       General: He is not in acute distress.      Appearance: Normal appearance. He is well-developed. HENT:      Head: Normocephalic and atraumatic. Right Ear: Tympanic membrane normal.      Left Ear: Tympanic membrane normal.   Eyes:      Conjunctiva/sclera: Conjunctivae normal.   Cardiovascular:      Rate and Rhythm: Normal rate and regular rhythm. Heart sounds: Normal heart sounds. No murmur heard. Pulmonary:      Effort: Pulmonary effort is normal.      Breath sounds: Normal breath sounds. No wheezing, rhonchi or rales. Abdominal:      General: There is no distension. Musculoskeletal:      Cervical back: Neck supple. Skin:     General: Skin is warm and dry. Findings: No rash (on exposed surfaces). Neurological:      General: No focal deficit present. Mental Status: He is alert. Psychiatric:         Attention and Perception: Attention normal.         Mood and Affect: Mood normal.         Speech: Speech normal.         Behavior: Behavior normal. Behavior is cooperative. Thought Content: Thought content normal.         Judgment: Judgment normal.         No flowsheet data found.     Lab Results   Component Value Date    CHOL 177 03/23/2022    CHOL 197 03/25/2021    CHOL 193 03/26/2020    CHOL 220 03/09/2017    CHOL 206 03/03/2016    CHOL 206 02/20/2015    TRIG 69 03/23/2022    TRIG 44 03/25/2021    TRIG 55 03/26/2020    HDL 58 03/23/2022    HDL 58 03/25/2021    HDL 71 03/26/2020    LDLCALC 105 03/23/2022    LDLCALC 130 03/25/2021    LDLCALC 111 03/26/2020    GLUCOSE 94 03/23/2022    LABA1C 5.6 03/23/2022    LABA1C 5.2 03/25/2021    LABA1C 5.1 09/24/2019       The 10-year ASCVD risk score ([address], et al., 2013) is: 10.1%    Values used to calculate the score:      Age: 62 years      Sex: Male      Is Non- : No      Diabetic: No      Tobacco smoker: No      Systolic Blood Pressure: 011 mmHg      Is BP treated: Yes      HDL Cholesterol: 58 mg/dL      Total Cholesterol: 177 mg/dL    Immunization History Administered Date(s) Administered    COVID-19, J&J, PF, 0.5 mL 03/10/2021    Hepatitis A Ped/Adol (Havrix, Vaqta) 11/30/2011    Hepatitis B Ped/Adol (Engerix-B, Recombivax HB) 04/07/1994, 05/10/1994, 10/27/1994       Health Maintenance   Topic Date Due    Hepatitis C screen  Never done    Depression Monitoring  Never done    HIV screen  Never done    DTaP/Tdap/Td vaccine (1 - Tdap) Never done    Shingles Vaccine (1 of 2) Never done    COVID-19 Vaccine (2 - Booster for [email or web address] series) 05/05/2021    Flu vaccine (1) Never done    A1C test (Diabetic or Prediabetic)  03/23/2023    Potassium monitoring  03/23/2023    Creatinine monitoring  03/23/2023    PSA counseling  03/24/2023    Lipid screen  03/23/2027    Colorectal Cancer Screen  07/12/2031    Hepatitis A vaccine  Aged Out    Hepatitis B vaccine  Aged Out    Hib vaccine  Aged Out    Meningococcal (ACWY) vaccine  Aged Out    Pneumococcal 0-64 years Vaccine  Aged Out       Assessment & Plan   Well adult health check  Essential hypertension  Chronic anxiety  Hypercholesterolemia  IFG (impaired fasting glucose)  Malignant neoplasm of posterior wall of urinary bladder (HCC)  Prostate cancer ([facility] [address]    -  Healthy lifestyle discussed  -  Labs reviewed, look fine overall  -  Chronic medical problems stable  -  Continue current medications  -  Follow up with specialists as scheduled    Return for Annual.         --Noah Rodriguez,

## 2022-04-01 ENCOUNTER — TELEMEDICINE (OUTPATIENT)
Dept: UROLOGY | Age: 62
End: 2022-04-01
Payer: COMMERCIAL

## 2022-04-01 DIAGNOSIS — C61 PROSTATE CANCER (HCC): Primary | ICD-10-CM

## 2022-04-01 PROCEDURE — 99443 PR PHYS/QHP TELEPHONE EVALUATION 21-30 MIN: CPT | Performed by: UROLOGY

## 2022-04-04 NOTE — PROGRESS NOTES
MD Deniz Benitez M.D [address] [facility] [place] [address]  Dept: [phone]  Dept Fax: [phone]: [address] [address] Urology Office Note      Patient:  Evelio Mclain  YOB: 1960    The patient is a 62 y.o. male who presents today for evaluation of the following problems:   Chief Complaint   Patient presents with    Follow-up     psa prior         HISTORY OF PRESENT ILLNESS:     Prostate Cancer  New diagnosis      FINAL DIAGNOSIS:   A: Right apex of prostate, core needle biopsy:             Negative for malignancy. B: Right mid prostate, core needle biopsy:             Negative for malignancy. C: Right base of prostate, core needle biopsy:             Negative for malignancy. D: Left apex of prostate, core needle biopsy:             Prostatic adenocarcinoma, acinar type, Aguila score 3+3 = 6   (grade group 1 of 5).           Carcinoma is present within 1 of 1 submitted core.  (11 mm /   20 mm; 55%)   E: Left mid prostate, core needle biopsy:             Prostatic adenocarcinoma, acinar type, Aguila score 3+3 = 6   (grade group 1 of 5).           Carcinoma is present within 1 of 1 submitted core.  (1 mm /   17 mm; 6%)   F: Left base of prostate, core needle biopsy:             Negative for malignancy. G: Prostate lesion #1, core needle biopsies:             Negative for malignancy. H: Prostate lesion #2, core needle biopsies:             Prostatic adenocarcinoma, acinar type, [place] score 3+3 = 6   (grade group 1 of 5).           Carcinoma is present within 2 of 3 submitted cores.  (7 mm /   45 mm; 16%)   I: Prostate lesion #3, core needle biopsies:     Necrotizing granulomatous inflammation.     Prostatic parenchyma is not identified.     Negative for organisms and malignancy.      Prostate size: 50 cc  9/28/2021   PSA, Free Pct  22.4      12/2021 %free PSA 26    Bladder Cancer  High grade noninvasive  Gets routine cystoscopy    Requested/reviewed records from Mary Greenberg DO office and/or outside physician/[facility]    (Patient's old records have been requested, reviewed and pertinent findings summarized in today's note.)    Procedures Today: N/A    Last several PSA's:  Lab Results   Component Value Date    PSA 10.67 03/24/2022    PSA 10.06 12/06/2021    PSA 7.10 (H) 09/28/2021       Last total testosterone:  No results found for: TESTOSTERONE    Urinalysis today:  No results found for this visit on 04/01/22. Last BUN and creatinine:  Lab Results   Component Value Date    BUN 21 03/23/2022     Lab Results   Component Value Date    CREATININE 0.98 03/23/2022       Imaging Reviewed during this Office Visit:   Rolando Vasquez M.D, MD independently reviewed the images and verified the radiology reports from:    MRI PROSTATE W WO CONTRAST    Result Date: 12/6/2021  PROCEDURE: MRI PROSTATE W WO CONTRAST CLINICAL INFORMATION: Elevated PSA COMPARISON: None TECHNIQUE: Axial T2, coronal T2 and sagittal T2 imaging of the prostate gland. Large field of view axial T2 imaging of the prostate gland. Axial T1, axial T1 VIBE dynamic post adán and axial T1 VIBE dynamic subtracted post adán images through the prostate gland. Diffusion 50, 800, 1400 and ADC maps through the prostate gland. Axial T1 STAR VIBE post adán through the pelvis. 3-D images reconstructed on a separate apta[employer] Cad workstation with MRI TRUS fusion of prostate mass lesions. CONTRAST: 20 mL of ProHance LABORATORY: 1. PSA on 9/28/2021 was 7.10 2. PSA on 3/25/2021 was 6.09 3. PSA on 9/20/2019 was 4.92. FINDINGS: PROSTATE SIZE: 1. The prostate gland is mildly enlarged measuring 6 x 4.5 x 4.6 cm. 2. The prostate volume is 58.22 ml. TRANSITIONAL ZONE: 1. Heterogeneous signal and nodularity is noted.  2. A discrete nodule is seen that bridges the right transitional and peripheral zone with bulge of the prostatic capsule which bulges the prostatic capsule. This is further characterized below. 3. Lesion #: 1 Location: Left central zone zone at the base (series 4, image 9) Size: 1.1 x 1 cm T2: Moderately hypointense ADC: Mildly decreased DWI: Mildly hyperintense DCE: Positive Prostate margin: Has a broad-based relationship with the prostatic capsule. Extraprostatic extension is not excluded. Overall PI-RADS category: 4 /5 PERIPHERAL ZONE: 1. There are areas of diffuse T2 hypointensity that can relate to prior inflammation/infection. 2. Lesion #: 2 Location: At the junction of the anterior and posterolateral left peripheral zone at the base (series 4, image 10) Size: 1.7 x 1 cm T2: Mild to moderately hypointense ADC: Mildly hypointense DWI: Mildly hyperintense DCE: Positive Prostate margin: Does not abut the prostate margin Overall PI-RADS category: 4 /5 3. Lesion #: 3 Location: Encompasses the right anterior peripheral zone and anterior transitional zone. Size: 2.1 x 1 cm T2: Markedly T2 hypointense ADC: Markedly hypointense DWI: Markedly hyperintense DCE: Negative Prostate margin: Bulges the prostatic capsule Overall PI-RADS category: 5 /5 SEMINAL VESICLES: Unremarkable. NEUROVASCULAR BUNDLES: Not involved URINARY BLADDER/RECTUM/PELVIC DIAPHRAGM: The bladder is grossly unremarkable. Pelvic diaphragm is unremarkable. The rectum is unremarkable. Jovon Rathke PATHOLOGICALLY ENLARGED LYMPH NODES: 1. None. OSSEOUS STRUCTURES: 1. Unremarkable. OTHER: 1. Small fat-containing bilateral inguinal hernias. . A small fat-containing umbilical hernia. 1. A 2.1 x 1 cm discrete nodule that encompasses right anterior peripheral zone and anterior transitional zone at the base with bulging of the prostatic capsule has a PI-RADS assessment of 5. The bulging of the prostatic capsule is worrisome for extraprostatic extension. 2. A 1.1 x 1 cm abnormality in the left central zone at the base has a PI-RADS assessment of 4.  A broad based relationship to the prostatic capsule is noted. Extraprostatic extension is not excluded. 3. A 1.7 x 1 cm abnormality at the junction of anterior and posterolateral left peripheral zone at the base has a PI-RADS assessment 4. 4. Mild prostatomegaly. 5. Small bilateral fat-containing inguinal hernias. * PI-RADS v 2.1 assessment categories PI-RADS 1: Very low (clinically significant cancer is highly unlikely to be present) PI-RADS 2: Low (clinically significant cancer is unlikely to be present) PI-RADS 3: Intermediate (the presence of clinically significant cancer is equivocal) PI-RADS 4: High (clinically significant cancer is likely to be present) PI-RADS-5: Very high (clinically significant cancer is highly likely to be present) **This report has been created using voice recognition software. It may contain minor errors which are inherent in voice recognition technology. ** Final report electronically signed by Dr Roger Zhong on 12/6/2021 9:22 AM      PAST MEDICAL, FAMILY AND SOCIAL HISTORY:  Past Medical History:   Diagnosis Date    Bursitis     Cancer ([address] 11/2019    bladder cancer    Chest pain     Erectile dysfunction     Hyperlipidemia     Hypertension     Sleep apnea      Past Surgical History:   Procedure Laterality Date    CARDIAC CATHETERIZATION  6/30/2011    CYSTOSCOPY N/A 12/5/2019    CYSTOSCOPY TRANSURETHRAL RESECTION BLADDER TUMOR performed by Alexey Sutherland MD at [address] N/A 11/7/2019    CYSTOSCOPY,  BLADDER BIOPSY,  TUR BLADDER TUMOR performed by Alexey Sutherland MD at [address] [facility]       Family History   Problem Relation Age of Onset    Heart Disease Mother     Diabetes Mother     Heart Disease Father     Heart Surgery Father     Prostate Cancer Neg Hx      Outpatient Medications Marked as Taking for the 4/1/22 encounter (Telemedicine) with Alexey Sutherland MD   Medication Sig Dispense Refill    lisinopril (PRINIVIL;ZESTRIL) 20 MG tablet Take 1 tablet by mouth daily 90 tablet 3    verapamil (CALAN SR) 180 MG extended release tablet TAKE 1 TABLET NIGHTLY 90 tablet 3    Multiple Vitamins-Minerals (MULTIVITAMIN MEN PO) Take by mouth      ibuprofen (IBU) 800 MG tablet TAKE 1 TABLET EVERY 6 HOURS AS NEEDED 360 tablet 3    sildenafil (VIAGRA) 100 MG tablet Take 1 tablet by mouth as needed for Erectile Dysfunction 18 tablet 3    Red Yeast Rice 600 MG CAPS Take 1 capsule by mouth daily Take by mouth 2 times daily 90 capsule 3    Probiotic Product (PROBIOTIC DAILY PO) Take by mouth daily      fish oil-omega-3 fatty acids 1000 MG capsule Take 2 g by mouth. Two tablets twice a day         Patient has no known allergies. Social History     Tobacco Use   Smoking Status Never Smoker   Smokeless Tobacco Never Used      (If patient a smoker, smoking cessation counseling offered)   Social History     Substance and Sexual Activity   Alcohol Use Yes    Alcohol/week: 0.0 standard drinks    Comment: daily 6 beers       REVIEW OF SYSTEMS:  Constitutional: negative  Eyes: negative  Respiratory: negative  Cardiovascular: negative  Gastrointestinal: negative  Genitourinary: see HPI  Musculoskeletal: negative  Skin: negative   Neurological: negative  Hematological/Lymphatic: negative  Psychological: negative      Physical Exam:    This a 62 y.o. male  There were no vitals filed for this visit. There is no height or weight on file to calculate BMI. Constitutional: Patient in no acute distress;         Assessment and Plan        1.  Prostate cancer [facility])               Plan:        Bladder cancer- needs routine cystoscopies, next one due in 5/2022  Prostate cancer: low risk per biopsy, on AS  PSA rising  Decipher came back as low risk of mets, mortality, adverse pathology  Discussed options again  Patient will stop by office next week to  a packet on prostate cancer treatment   Recheck PSA again in 1-2 months  Discussed that if PSA rises again that he may need to undergo some kind of treatment  Discussed may need confirmation saturation biopsy in future if still wanting active surveillance              Prescriptions Ordered:  No orders of the defined types were placed in this encounter. Orders Placed:  Orders Placed This Encounter   Procedures    PSA, Diagnostic     Standing Status:   Future     Standing Expiration Date:   4/1/2023            Sahara Goldstein M.D, MD       Neo Partida is a 62 y.o. male evaluated via telephone on 4/1/2022 for Follow-up (psa prior )  . Documentation:  I communicated with the patient and/or health care decision maker about see above. Details of this discussion including any medical advice provided: see above    Total Time: minutes: 21-30 minutes    Neo Partida was evaluated through a synchronous (real-time) audio encounter. Patient identification was verified at the start of the visit. He (or guardian if applicable) is aware that this is a billable service, which includes applicable co-pays. This visit was conducted with the patient's (and/or legal guardian's) verbal consent. He has not had a related appointment within my department in the past 7 days or scheduled within the next 24 hours. The patient was located in a state where the provider was licensed to provide care.     Note: not billable if this call serves to triage the patient into an appointment for the relevant concern    Sahara Goldstein M.D, MD

## 2022-06-30 LAB — PROSTATE SPECIFIC ANTIGEN: 7.84 NG/ML

## 2022-07-07 ENCOUNTER — OFFICE VISIT (OUTPATIENT)
Dept: UROLOGY | Age: 62
End: 2022-07-07
Payer: COMMERCIAL

## 2022-07-07 VITALS
WEIGHT: 243 LBS | HEIGHT: 70 IN | SYSTOLIC BLOOD PRESSURE: 140 MMHG | BODY MASS INDEX: 34.79 KG/M2 | DIASTOLIC BLOOD PRESSURE: 70 MMHG

## 2022-07-07 DIAGNOSIS — C61 PROSTATE CANCER (HCC): Primary | ICD-10-CM

## 2022-07-07 DIAGNOSIS — C67.2 MALIGNANT NEOPLASM OF LATERAL WALL OF URINARY BLADDER (HCC): ICD-10-CM

## 2022-07-07 DIAGNOSIS — Z85.51 HISTORY OF BLADDER CANCER: ICD-10-CM

## 2022-07-07 DIAGNOSIS — R97.20 ELEVATED PSA: ICD-10-CM

## 2022-07-07 PROCEDURE — 99214 OFFICE O/P EST MOD 30 MIN: CPT | Performed by: UROLOGY

## 2022-07-07 NOTE — PROGRESS NOTES
MD Deniz Bah M.D [facility] [phone]  Dept: [phone]  Dept Fax: [phone]: [address] [address] Urology Office Note -     Patient:  Paola Flores  YOB: 1960    The patient is a 65 y.o. male who presents today for evaluation of the following problems:   Chief Complaint   Patient presents with    Follow-up    Bladder Cancer    Prostate Cancer     psa prior         HISTORY OF PRESENT ILLNESS:     Prostate Cancer  New diagnosis      FINAL DIAGNOSIS:   A: Right apex of prostate, core needle biopsy:             Negative for malignancy. B: Right mid prostate, core needle biopsy:             Negative for malignancy. C: Right base of prostate, core needle biopsy:             Negative for malignancy. D: Left apex of prostate, core needle biopsy:             Prostatic adenocarcinoma, acinar type, [place] score 3+3 = 6   (grade group 1 of 5).           Carcinoma is present within 1 of 1 submitted core.  (11 mm /   20 mm; 55%)   E: Left mid prostate, core needle biopsy:             Prostatic adenocarcinoma, acinar type, [place] score 3+3 = 6   (grade group 1 of 5).           Carcinoma is present within 1 of 1 submitted core.  (1 mm /   17 mm; 6%)   F: Left base of prostate, core needle biopsy:             Negative for malignancy. G: Prostate lesion #1, core needle biopsies:             Negative for malignancy. H: Prostate lesion #2, core needle biopsies:             Prostatic adenocarcinoma, acinar type, [place] score 3+3 = 6   (grade group 1 of 5).           Carcinoma is present within 2 of 3 submitted cores.  (7 mm /   45 mm; 16%)   I: Prostate lesion #3, core needle biopsies:     Necrotizing granulomatous inflammation.     Prostatic parenchyma is not identified.     Negative for organisms and malignancy.      Prostate size: 50 cc  9/28/2021   PSA, Free Pct  22.4      12/2021 %free PSA 26    Bladder Cancer  High grade noninvasive  Gets routine cystoscopy    Requested/reviewed records from Antonio Almonte DO office and/or outside [de-identified]    (Patient's old records have been requested, reviewed and pertinent findings summarized in today's note.)    Procedures Today: N/A    Last several PSA's:  Lab Results   Component Value Date    PSA 7.84 06/30/2022    PSA 10.67 03/24/2022    PSA 10.06 12/06/2021       Last total testosterone:  No results found for: TESTOSTERONE    Urinalysis today:  No results found for this visit on 07/07/22. Last BUN and creatinine:  Lab Results   Component Value Date    BUN 21 03/23/2022     Lab Results   Component Value Date    CREATININE 0.98 03/23/2022       Imaging Reviewed during this Office Visit:   José Lin M.D, MD independently reviewed the images and verified the radiology reports from:    MRI PROSTATE W WO CONTRAST  Result Date: 12/6/2021  1. A 2.1 x 1 cm discrete nodule that encompasses right anterior peripheral zone and anterior transitional zone at the base with bulging of the prostatic capsule has a PI-RADS assessment of 5. The bulging of the prostatic capsule is worrisome for extraprostatic extension. 2. A 1.1 x 1 cm abnormality in the left central zone at the base has a PI-RADS assessment of 4. A broad based relationship to the prostatic capsule is noted. Extraprostatic extension is not excluded. 3. A 1.7 x 1 cm abnormality at the junction of anterior and posterolateral left peripheral zone at the base has a PI-RADS assessment 4. 4. Mild prostatomegaly. 5. Small bilateral fat-containing inguinal hernias.        * PI-RADS v 2.1 assessment categories PI-RADS 1: Very low (clinically significant cancer is highly unlikely to be present) PI-RADS 2: Low (clinically significant cancer is unlikely to be present) PI-RADS 3: Intermediate (the presence of clinically significant cancer is equivocal) PI-RADS 4: High (clinically significant cancer is likely to be present) PI-RADS-5: Very high (clinically significant cancer is highly likely to be present) **This report has been created using voice recognition software. It may contain minor errors which are inherent in voice recognition technology. ** Final report electronically signed by Dr Farheen Vazquez on 12/6/2021 9:22 AM      PAST MEDICAL, FAMILY AND SOCIAL HISTORY:  Past Medical History:   Diagnosis Date    Bursitis     Cancer ([address] 11/2019    bladder cancer    Chest pain     Erectile dysfunction     Hyperlipidemia     Hypertension     Sleep apnea      Past Surgical History:   Procedure Laterality Date    CARDIAC CATHETERIZATION  6/30/2011    CYSTOSCOPY N/A 12/5/2019    CYSTOSCOPY TRANSURETHRAL RESECTION BLADDER TUMOR performed by Moy Aguilar MD at [address] [place] [address] N/A 11/7/2019    CYSTOSCOPY,  BLADDER BIOPSY,  TUR BLADDER TUMOR performed by Moy Aguilar MD at [address] [place] [address]       Family History   Problem Relation Age of Onset    Heart Disease Mother     Diabetes Mother     Heart Disease Father     Heart Surgery Father     Prostate Cancer Neg Hx      No outpatient medications have been marked as taking for the 7/7/22 encounter (Office Visit) with Moy Aguilar MD.       Patient has no known allergies.   Social History     Tobacco Use   Smoking Status Never Smoker   Smokeless Tobacco Never Used      (If patient a smoker, smoking cessation counseling offered)   Social History     Substance and Sexual Activity   Alcohol Use Yes    Alcohol/week: 0.0 standard drinks    Comment: daily 6 beers       REVIEW OF SYSTEMS:  Constitutional: negative  Eyes: negative  Respiratory: negative  Cardiovascular: negative  Gastrointestinal: negative  Genitourinary: see HPI  Musculoskeletal: negative  Skin: negative   Neurological: negative  Hematological/Lymphatic: negative  Psychological: negative      Physical Exam: This a 65 y.o. male  Vitals:    07/07/22 0749   BP: (!) 140/70     Body mass index is 34.87 kg/m². Constitutional: Patient in no acute distress;         Assessment and Plan        1. Prostate cancer ([address]    2. History of bladder cancer    3. Elevated PSA    4. Malignant neoplasm of lateral wall of urinary bladder (HCC)               Plan:        Bladder cancer- needs routine cystoscopies, due for cystoscopy  Prostate cancer: low risk per biopsy, on AS  PSA decreased to 7.84, recheck PSA in 3 months  Decipher came back as low risk of mets, mortality, adverse pathology  He would like to continue AS  Office Cystoscopy and PSA in 3 months  Weak stream: discussed Flomax, he would like to hold off  Discussed may need confirmation saturation biopsy in future if still wanting active surveillance        Prescriptions Ordered:  No orders of the defined types were placed in this encounter.      Orders Placed:  Orders Placed This Encounter   Procedures    PSA, Diagnostic     Standing Status:   Future     Standing Expiration Date:   7/7/2023            Matt Zuniga M.D, MD

## 2022-07-20 DIAGNOSIS — I10 ESSENTIAL HYPERTENSION: ICD-10-CM

## 2022-07-20 RX ORDER — LISINOPRIL 20 MG/1
20 TABLET ORAL DAILY
Qty: 90 TABLET | Refills: 3 | Status: SHIPPED | OUTPATIENT
Start: 2022-07-20

## 2022-10-07 LAB — PROSTATE SPECIFIC ANTIGEN: 9.8 NG/ML

## 2022-10-14 ENCOUNTER — TELEPHONE (OUTPATIENT)
Dept: PULMONOLOGY | Age: 62
End: 2022-10-14

## 2022-10-14 ENCOUNTER — PROCEDURE VISIT (OUTPATIENT)
Dept: UROLOGY | Age: 62
End: 2022-10-14
Payer: COMMERCIAL

## 2022-10-14 VITALS
BODY MASS INDEX: 34.36 KG/M2 | SYSTOLIC BLOOD PRESSURE: 130 MMHG | HEIGHT: 70 IN | WEIGHT: 240 LBS | DIASTOLIC BLOOD PRESSURE: 68 MMHG

## 2022-10-14 DIAGNOSIS — R97.20 ELEVATED PSA: ICD-10-CM

## 2022-10-14 DIAGNOSIS — G47.33 OSA ON CPAP: Primary | ICD-10-CM

## 2022-10-14 DIAGNOSIS — Z85.51 HISTORY OF BLADDER CANCER: Primary | ICD-10-CM

## 2022-10-14 DIAGNOSIS — C67.2 MALIGNANT NEOPLASM OF LATERAL WALL OF URINARY BLADDER (HCC): ICD-10-CM

## 2022-10-14 DIAGNOSIS — C61 PROSTATE CANCER (HCC): ICD-10-CM

## 2022-10-14 DIAGNOSIS — Z99.89 OSA ON CPAP: Primary | ICD-10-CM

## 2022-10-14 PROCEDURE — 99214 OFFICE O/P EST MOD 30 MIN: CPT | Performed by: UROLOGY

## 2022-10-14 PROCEDURE — 52000 CYSTOURETHROSCOPY: CPT | Performed by: UROLOGY

## 2022-10-14 NOTE — PROGRESS NOTES
MD Jolly Bonilla M.DSt[facility] [place] [phone]  Dept: [phone]  Dept Fax: [phone]: [address] [address] Urology Office Note -     Patient:  Bandar Lau  YOB: 1960    The patient is a 65 y.o. male who presents today for evaluation of the following problems:   Chief Complaint   Patient presents with    Procedure     Cystoscopy         HISTORY OF PRESENT ILLNESS:     Prostate Cancer  New diagnosis      FINAL DIAGNOSIS:   A: Right apex of prostate, core needle biopsy:             Negative for malignancy. B: Right mid prostate, core needle biopsy:             Negative for malignancy. C: Right base of prostate, core needle biopsy:             Negative for malignancy. D: Left apex of prostate, core needle biopsy:             Prostatic adenocarcinoma, acinar type, Houston score 3+3 = 6   (grade group 1 of 5). Carcinoma is present within 1 of 1 submitted core. (11 mm /   20 mm; 55%)   E: Left mid prostate, core needle biopsy:             Prostatic adenocarcinoma, acinar type, Houston score 3+3 = 6   (grade group 1 of 5). Carcinoma is present within 1 of 1 submitted core. (1 mm /   17 mm; 6%)   F: Left base of prostate, core needle biopsy:             Negative for malignancy. G: Prostate lesion #1, core needle biopsies:             Negative for malignancy. H: Prostate lesion #2, core needle biopsies:             Prostatic adenocarcinoma, acinar type, Aguila score 3+3 = 6   (grade group 1 of 5). Carcinoma is present within 2 of 3 submitted cores. (7 mm /   45 mm; 16%)   I: Prostate lesion #3, core needle biopsies:     Necrotizing granulomatous inflammation. Prostatic parenchyma is not identified. Negative for organisms and malignancy.      Prostate size: 50 cc  9/28/2021 Free PSA 22.4    Bladder Cancer  High grade noninvasive  Gets routine cystoscopy    Requested/reviewed records from Fabricio Frye DO office and/or outside physician/[facility]    (Patient's old records have been requested, reviewed and pertinent findings summarized in today's note.)    Procedures Today: N/A    Last several PSA's:  Lab Results   Component Value Date    PSA 9.80 10/07/2022    PSA 7.84 06/30/2022    PSA 10.67 03/24/2022       Last total testosterone:  No results found for: TESTOSTERONE    Urinalysis today:  No results found for this visit on 10/14/22. Last BUN and creatinine:  Lab Results   Component Value Date    BUN 21 03/23/2022     Lab Results   Component Value Date    CREATININE 0.98 03/23/2022       Imaging Reviewed during this Office Visit:   Kaila Nick M.D, MD independently reviewed the images and verified the radiology reports from:    MRI PROSTATE W WO CONTRAST  Result Date: 12/6/2021  1. A 2.1 x 1 cm discrete nodule that encompasses right anterior peripheral zone and anterior transitional zone at the base with bulging of the prostatic capsule has a PI-RADS assessment of 5. The bulging of the prostatic capsule is worrisome for extraprostatic extension. 2. A 1.1 x 1 cm abnormality in the left central zone at the base has a PI-RADS assessment of 4. A broad based relationship to the prostatic capsule is noted. Extraprostatic extension is not excluded. 3. A 1.7 x 1 cm abnormality at the junction of anterior and posterolateral left peripheral zone at the base has a PI-RADS assessment 4. 4. Mild prostatomegaly. 5. Small bilateral fat-containing inguinal hernias.          PAST MEDICAL, FAMILY AND SOCIAL HISTORY:  Past Medical History:   Diagnosis Date    Bursitis     Cancer ([address] 11/2019    bladder cancer    Chest pain     Erectile dysfunction     Hyperlipidemia     Hypertension     Sleep apnea      Past Surgical History:   Procedure Laterality Date    CARDIAC CATHETERIZATION  6/30/2011    CYSTOSCOPY N/A 12/5/2019    CYSTOSCOPY TRANSURETHRAL RESECTION BLADDER TUMOR performed by Riley Pierre MD at [address] [place] [address] N/A 11/7/2019    CYSTOSCOPY,  BLADDER BIOPSY,  TUR BLADDER TUMOR performed by Riley Pierre MD at [facility]       Family History   Problem Relation Age of Onset    Heart Disease Mother     Diabetes Mother     Heart Disease Father     Heart Surgery Father     Prostate Cancer Neg Hx      Outpatient Medications Marked as Taking for the 10/14/22 encounter (Procedure visit) with Riley Pierre MD   Medication Sig Dispense Refill    lisinopril (PRINIVIL;ZESTRIL) 20 MG tablet Take 1 tablet by mouth in the morning. 90 tablet 3    verapamil (CALAN SR) 180 MG extended release tablet TAKE 1 TABLET NIGHTLY 90 tablet 3    clonazePAM (KLONOPIN) 0.5 MG tablet Take 1 tablet by mouth 2 times daily as needed for Anxiety for up to 180 days. 180 tablet 1    Multiple Vitamins-Minerals (MULTIVITAMIN MEN PO) Take by mouth      ibuprofen (IBU) 800 MG tablet TAKE 1 TABLET EVERY 6 HOURS AS NEEDED 360 tablet 3    sildenafil (VIAGRA) 100 MG tablet Take 1 tablet by mouth as needed for Erectile Dysfunction 18 tablet 3    Red Yeast Rice 600 MG CAPS Take 1 capsule by mouth daily Take by mouth 2 times daily 90 capsule 3    Probiotic Product (PROBIOTIC DAILY PO) Take by mouth daily      fish oil-omega-3 fatty acids 1000 MG capsule Take 2 g by mouth. Two tablets twice a day         Patient has no known allergies.   Social History     Tobacco Use   Smoking Status Never   Smokeless Tobacco Never      (If patient a smoker, smoking cessation counseling offered)   Social History     Substance and Sexual Activity   Alcohol Use Yes    Alcohol/week: 0.0 standard drinks    Comment: daily 6 beers       REVIEW OF SYSTEMS:  Constitutional: negative  Eyes: negative  Respiratory: negative  Cardiovascular: negative  Gastrointestinal: negative  Genitourinary: see HPI  Musculoskeletal: negative  Skin: negative   Neurological: negative  Hematological/Lymphatic: negative  Psychological: negative      Physical Exam:    This a 65 y.o. male  Vitals:    10/14/22 0814   BP: 130/68     Body mass index is 34.44 kg/m². Constitutional: Patient in no acute distress;     Cystoscopy Operative Note  Surgeon: Ila Sykes M.D, MD   Anesthesia: Urethral 2%  Indications: bladder cancer  Position: supine  Findings:   The patient was prepped and draped in the usual sterile fashion. The flexible cystoscope was advanced through the urethra and into the bladder. The bladder was thoroughly inspected and the following was noted:    Residual Urine: Minimal / Moderate / Significant  Urethra: No abnormalities of the urethra are noted. Prostate: Partial / Complete obstruction by lateral / median lobe of prostate. Bladder: No tumors or CIS noted. No bladder diverticulum. Mild / Moderate / Severe trabeculation noted. Ureters: Clear efflux from both ureters. Orifices with normal configuration and location. The cystoscope was removed. The patient tolerated the procedure well. Assessment and Plan        1. History of bladder cancer    2. Prostate cancer ([address]    3. Elevated PSA    4. Malignant neoplasm of lateral wall of urinary bladder (HCC)               Plan:        Bladder cancer- Office cystoscopy negative today, repeat q6 months  Prostate cancer: low risk per biopsy, on AS  PSA decreased to stable at 10 from 3/2022, recheck PSA in 6 months  Decipher came back as low risk of mets, mortality, adverse pathology  He would like to continue AS  Weak stream: discussed Flomax, he would like to hold off  Discussed may need confirmation saturation biopsy in future if still wanting active surveillance        Prescriptions Ordered:  No orders of the defined types were placed in this encounter.      Orders Placed:  Orders Placed This Encounter   Procedures    PSA, Diagnostic     Standing Status:   Future     Standing Expiration Date:   10/14/2023 Klaudia Bruno M.D, MD

## 2022-10-14 NOTE — TELEPHONE ENCOUNTER
Maciel Dodson wants to wait for replacement CPA from [employer] so he will need a order with his pressures.

## 2023-04-06 LAB — PSA, ULTRASENSITIVE: 9.93

## 2023-04-24 ENCOUNTER — TELEPHONE (OUTPATIENT)
Dept: PULMONOLOGY | Age: 63
End: 2023-04-24

## 2023-04-24 NOTE — TELEPHONE ENCOUNTER
Patient came in and asked about his [employer] recall. I called [employer], as we have sent them three requests for the patient to receive a cpap and he stil has not. I finally got through to a representative and had the patient confirm that he wanted the autopap option available. Patient verbalized understanding with the [employer] representative and agreed to have the autopap shipped. Patient scheduled for an appt I august to adjust autopap pressures. Told patient to call if he needs anything else and when he receives his autopap. Patient verbalized understanding.

## 2023-04-25 ENCOUNTER — OFFICE VISIT (OUTPATIENT)
Dept: FAMILY MEDICINE CLINIC | Age: 63
End: 2023-04-25
Payer: COMMERCIAL

## 2023-04-25 VITALS
WEIGHT: 243.6 LBS | RESPIRATION RATE: 16 BRPM | BODY MASS INDEX: 34.95 KG/M2 | HEART RATE: 56 BPM | DIASTOLIC BLOOD PRESSURE: 62 MMHG | SYSTOLIC BLOOD PRESSURE: 136 MMHG

## 2023-04-25 DIAGNOSIS — E78.00 HYPERCHOLESTEROLEMIA: ICD-10-CM

## 2023-04-25 DIAGNOSIS — Z00.00 WELL ADULT HEALTH CHECK: Primary | ICD-10-CM

## 2023-04-25 DIAGNOSIS — N52.01 ERECTILE DYSFUNCTION DUE TO ARTERIAL INSUFFICIENCY: ICD-10-CM

## 2023-04-25 DIAGNOSIS — M10.00 IDIOPATHIC GOUT, UNSPECIFIED CHRONICITY, UNSPECIFIED SITE: ICD-10-CM

## 2023-04-25 DIAGNOSIS — F41.9 CHRONIC ANXIETY: ICD-10-CM

## 2023-04-25 DIAGNOSIS — M48.02 CERVICAL STENOSIS OF SPINAL CANAL: ICD-10-CM

## 2023-04-25 DIAGNOSIS — I10 ESSENTIAL HYPERTENSION: ICD-10-CM

## 2023-04-25 DIAGNOSIS — C61 PROSTATE CANCER (HCC): ICD-10-CM

## 2023-04-25 DIAGNOSIS — M54.31 SCIATICA OF RIGHT SIDE: ICD-10-CM

## 2023-04-25 DIAGNOSIS — C67.4 MALIGNANT NEOPLASM OF POSTERIOR WALL OF URINARY BLADDER (HCC): ICD-10-CM

## 2023-04-25 LAB
ABSOLUTE BASO #: 0.01 K/UL (ref 0–0.2)
ABSOLUTE EOS #: 0.02 K/UL (ref 0–0.5)
ABSOLUTE LYMPH #: 1.45 K/UL (ref 1–4)
ABSOLUTE MONO #: 0.41 K/UL (ref 0.2–1)
ABSOLUTE NEUT #: 1.78 K/UL (ref 1.5–7.5)
AVERAGE GLUCOSE: 111 MG/DL
BASOPHILS RELATIVE PERCENT: 0.3 %
EOSINOPHILS RELATIVE PERCENT: 0.5 %
HBA1C MFR BLD: 5.5 % (ref 4.2–5.6)
HCT VFR BLD CALC: 42.8 % (ref 40–51)
HEMOGLOBIN: 14.7 G/DL (ref 13.5–17)
LYMPHOCYTE %: 39.4 %
MCH RBC QN AUTO: 33.1 PG (ref 25–33)
MCHC RBC AUTO-ENTMCNC: 34.3 G/DL (ref 31–36)
MCV RBC AUTO: 96.4 FL (ref 80–99)
MONOCYTES # BLD: 11.1 %
NEUTROPHILS RELATIVE PERCENT: 48.4 %
PDW BLD-RTO: 11.9 % (ref 11.5–15)
PLATELETS: 164 K/UL (ref 130–400)
PMV BLD AUTO: 11 FL (ref 9.3–13)
RBC: 4.44 M/UL (ref 4.5–6.1)
WBC: 3.7 K/UL (ref 3.5–11)

## 2023-04-25 PROCEDURE — 3075F SYST BP GE 130 - 139MM HG: CPT | Performed by: FAMILY MEDICINE

## 2023-04-25 PROCEDURE — 3078F DIAST BP <80 MM HG: CPT | Performed by: FAMILY MEDICINE

## 2023-04-25 PROCEDURE — 99396 PREV VISIT EST AGE 40-64: CPT | Performed by: FAMILY MEDICINE

## 2023-04-25 RX ORDER — IBUPROFEN 800 MG/1
TABLET ORAL
Qty: 360 TABLET | Refills: 3 | Status: SHIPPED | OUTPATIENT
Start: 2023-04-25

## 2023-04-25 RX ORDER — LISINOPRIL 20 MG/1
20 TABLET ORAL DAILY
Qty: 90 TABLET | Refills: 3 | Status: SHIPPED | OUTPATIENT
Start: 2023-04-25

## 2023-04-25 RX ORDER — CLONAZEPAM 0.5 MG/1
0.5 TABLET ORAL 2 TIMES DAILY PRN
Qty: 180 TABLET | Refills: 1 | Status: SHIPPED | OUTPATIENT
Start: 2023-04-25 | End: 2023-10-22

## 2023-04-25 RX ORDER — SILDENAFIL 100 MG/1
100 TABLET, FILM COATED ORAL PRN
Qty: 18 TABLET | Refills: 3 | Status: SHIPPED | OUTPATIENT
Start: 2023-04-25

## 2023-04-25 SDOH — ECONOMIC STABILITY: INCOME INSECURITY: HOW HARD IS IT FOR YOU TO PAY FOR THE VERY BASICS LIKE FOOD, HOUSING, MEDICAL CARE, AND HEATING?: NOT HARD AT ALL

## 2023-04-25 SDOH — ECONOMIC STABILITY: FOOD INSECURITY: WITHIN THE PAST 12 MONTHS, YOU WORRIED THAT YOUR FOOD WOULD RUN OUT BEFORE YOU GOT MONEY TO BUY MORE.: NEVER TRUE

## 2023-04-25 SDOH — ECONOMIC STABILITY: FOOD INSECURITY: WITHIN THE PAST 12 MONTHS, THE FOOD YOU BOUGHT JUST DIDN'T LAST AND YOU DIDN'T HAVE MONEY TO GET MORE.: NEVER TRUE

## 2023-04-25 SDOH — ECONOMIC STABILITY: HOUSING INSECURITY
IN THE LAST 12 MONTHS, WAS THERE A TIME WHEN YOU DID NOT HAVE A STEADY PLACE TO SLEEP OR SLEPT IN A SHELTER (INCLUDING NOW)?: NO

## 2023-04-25 ASSESSMENT — PATIENT HEALTH QUESTIONNAIRE - PHQ9
10. IF YOU CHECKED OFF ANY PROBLEMS, HOW DIFFICULT HAVE THESE PROBLEMS MADE IT FOR YOU TO DO YOUR WORK, TAKE CARE OF THINGS AT HOME, OR GET ALONG WITH OTHER PEOPLE: 0
8. MOVING OR SPEAKING SO SLOWLY THAT OTHER PEOPLE COULD HAVE NOTICED. OR THE OPPOSITE, BEING SO FIGETY OR RESTLESS THAT YOU HAVE BEEN MOVING AROUND A LOT MORE THAN USUAL: 0
SUM OF ALL RESPONSES TO PHQ QUESTIONS 1-9: 0
5. POOR APPETITE OR OVEREATING: 0
SUM OF ALL RESPONSES TO PHQ QUESTIONS 1-9: 0
9. THOUGHTS THAT YOU WOULD BE BETTER OFF DEAD, OR OF HURTING YOURSELF: 0
2. FEELING DOWN, DEPRESSED OR HOPELESS: 0
SUM OF ALL RESPONSES TO PHQ9 QUESTIONS 1 & 2: 0
6. FEELING BAD ABOUT YOURSELF - OR THAT YOU ARE A FAILURE OR HAVE LET YOURSELF OR YOUR FAMILY DOWN: 0
4. FEELING TIRED OR HAVING LITTLE ENERGY: 0
SUM OF ALL RESPONSES TO PHQ QUESTIONS 1-9: 0
1. LITTLE INTEREST OR PLEASURE IN DOING THINGS: 0
7. TROUBLE CONCENTRATING ON THINGS, SUCH AS READING THE NEWSPAPER OR WATCHING TELEVISION: 0
SUM OF ALL RESPONSES TO PHQ QUESTIONS 1-9: 0
3. TROUBLE FALLING OR STAYING ASLEEP: 0

## 2023-04-25 ASSESSMENT — ENCOUNTER SYMPTOMS
GASTROINTESTINAL NEGATIVE: 1
RESPIRATORY NEGATIVE: 1

## 2023-04-25 NOTE — PROGRESS NOTES
2023    Bandar Lau (:  1960) is a 65 y.o. male, here for a preventive medicine evaluation. Chief Complaint   Patient presents with    Annual Exam    Medication Refill     Annual eval.    Doing well overall. Continues to follow closely with Urology, recent PSA in [facility]. BPs fine. BP Readings from Last 3 Encounters:   23 136/62   10/14/22 130/68   22 (!) 140/70     Wt Readings from Last 3 Encounters:   23 243 lb 9.6 oz (110.5 kg)   23 240 lb (108.9 kg)   10/14/22 240 lb (108.9 kg)       Patient Active Problem List   Diagnosis    HTN (hypertension)    OA (osteoarthritis), multiple joints    ED (erectile dysfunction)    Chronic anxiety    Lumbar spinal stenosis    Depression    Post concussion syndrome    Fatigue    Abnormal laboratory test result, PSA mild elevated. Medication monitoring encounter    Sciatica of right side, intermittent. Hypercholesterolemia    Cerumen impaction    Obesity (BMI 30-39. 9)    IFG (impaired fasting glucose)    Cervical stenosis of spinal canal    Obstructive sleep apnea on CPAP    Muscle cramps    Dysfunction of right eustachian tube       Review of Systems   Constitutional: Negative. HENT: Negative. Respiratory: Negative. Cardiovascular: Negative. Gastrointestinal: Negative. Musculoskeletal: Negative. All other systems reviewed and are negative. Prior to Visit Medications    Medication Sig Taking?  Authorizing Provider   lisinopril (PRINIVIL;ZESTRIL) 20 MG tablet Take 1 tablet by mouth daily Yes Severa Dark, DO   verapamil (CALAN SR) 180 MG extended release tablet TAKE 1 TABLET NIGHTLY Yes Severa Dark, DO   ibuprofen (IBU) 800 MG tablet TAKE 1 TABLET EVERY 6 HOURS AS NEEDED Yes Severa Dark, DO   sildenafil (VIAGRA) 100 MG tablet Take 1 tablet by mouth as needed for Erectile Dysfunction Yes Severa Dark, DO   clonazePAM (KLONOPIN) 0.5 MG tablet Take 1 tablet by mouth 2

## 2023-04-26 LAB
ALBUMIN SERPL-MCNC: 4.8 G/DL (ref 3.5–5.2)
ALK PHOSPHATASE: 61 U/L (ref 40–123)
ALT SERPL-CCNC: 17 U/L (ref 5–50)
ANION GAP SERPL CALCULATED.3IONS-SCNC: 12 MEQ/L (ref 7–16)
AST SERPL-CCNC: 20 U/L (ref 9–50)
BILIRUB SERPL-MCNC: 0.4 MG/DL
BUN BLDV-MCNC: 21 MG/DL (ref 8–23)
CALCIUM SERPL-MCNC: 10.2 MG/DL (ref 8.5–10.5)
CHLORIDE BLD-SCNC: 104 MEQ/L (ref 95–107)
CHOLESTEROL/HDL RATIO: 3.3 RATIO
CHOLESTEROL: 186 MG/DL
CO2: 26 MEQ/L (ref 19–31)
CREAT SERPL-MCNC: 0.99 MG/DL (ref 0.8–1.4)
EGFR IF NONAFRICAN AMERICAN: 87 ML/MIN/1.73
GLUCOSE: 107 MG/DL (ref 70–99)
HDLC SERPL-MCNC: 56 MG/DL
LDL CHOLESTEROL CALCULATED: 113 MG/DL
LDL/HDL RATIO: 2 RATIO
POTASSIUM SERPL-SCNC: 4.8 MEQ/L (ref 3.5–5.4)
SODIUM BLD-SCNC: 142 MEQ/L (ref 133–146)
TOTAL PROTEIN: 7 G/DL (ref 6.1–8.3)
TRIGL SERPL-MCNC: 83 MG/DL
TSH SERPL DL<=0.05 MIU/L-ACNC: 2.13 UIU/ML (ref 0.4–4.1)
URIC ACID: 7 MG/DL (ref 3.7–8)
VLDLC SERPL CALC-MCNC: 17 MG/DL

## 2023-04-30 LAB
SEX HORMONE BINDING GLOBULIN: 72.5 NMOL/L (ref 19.3–76.4)
TESTOSTERONE FREE, CALC: 92.9 PG/ML (ref 47–244)
TESTOSTERONE FREE: 727 NG/DL (ref 300–720)

## 2023-07-19 ENCOUNTER — OFFICE VISIT (OUTPATIENT)
Dept: FAMILY MEDICINE CLINIC | Age: 63
End: 2023-07-19
Payer: COMMERCIAL

## 2023-07-19 VITALS
TEMPERATURE: 98.7 F | RESPIRATION RATE: 16 BRPM | DIASTOLIC BLOOD PRESSURE: 86 MMHG | OXYGEN SATURATION: 98 % | WEIGHT: 234.6 LBS | HEART RATE: 60 BPM | SYSTOLIC BLOOD PRESSURE: 160 MMHG | HEIGHT: 70 IN | BODY MASS INDEX: 33.58 KG/M2

## 2023-07-19 DIAGNOSIS — N52.9 ERECTILE DYSFUNCTION, UNSPECIFIED ERECTILE DYSFUNCTION TYPE: ICD-10-CM

## 2023-07-19 DIAGNOSIS — L25.5 RHUS DERMATITIS: Primary | ICD-10-CM

## 2023-07-19 DIAGNOSIS — I10 ESSENTIAL HYPERTENSION: ICD-10-CM

## 2023-07-19 DIAGNOSIS — R60.0 LOWER LEG EDEMA: ICD-10-CM

## 2023-07-19 PROCEDURE — 99214 OFFICE O/P EST MOD 30 MIN: CPT | Performed by: FAMILY MEDICINE

## 2023-07-19 PROCEDURE — 96372 THER/PROPH/DIAG INJ SC/IM: CPT | Performed by: FAMILY MEDICINE

## 2023-07-19 PROCEDURE — 3074F SYST BP LT 130 MM HG: CPT | Performed by: FAMILY MEDICINE

## 2023-07-19 PROCEDURE — 3078F DIAST BP <80 MM HG: CPT | Performed by: FAMILY MEDICINE

## 2023-07-19 RX ORDER — FUROSEMIDE 20 MG/1
20 TABLET ORAL DAILY
Qty: 5 TABLET | Refills: 0 | Status: SHIPPED | OUTPATIENT
Start: 2023-07-19

## 2023-07-19 RX ORDER — TADALAFIL 20 MG/1
20 TABLET ORAL DAILY PRN
Qty: 30 TABLET | Refills: 1 | Status: SHIPPED | OUTPATIENT
Start: 2023-07-19

## 2023-07-19 RX ORDER — METHYLPREDNISOLONE ACETATE 40 MG/ML
40 INJECTION, SUSPENSION INTRA-ARTICULAR; INTRALESIONAL; INTRAMUSCULAR; SOFT TISSUE ONCE
Status: COMPLETED | OUTPATIENT
Start: 2023-07-19 | End: 2023-07-19

## 2023-07-19 RX ORDER — METHYLPREDNISOLONE ACETATE 80 MG/ML
80 INJECTION, SUSPENSION INTRA-ARTICULAR; INTRALESIONAL; INTRAMUSCULAR; SOFT TISSUE ONCE
Status: COMPLETED | OUTPATIENT
Start: 2023-07-19 | End: 2023-07-19

## 2023-07-19 RX ADMIN — METHYLPREDNISOLONE ACETATE 40 MG: 40 INJECTION, SUSPENSION INTRA-ARTICULAR; INTRALESIONAL; INTRAMUSCULAR; SOFT TISSUE at 15:02

## 2023-07-19 RX ADMIN — METHYLPREDNISOLONE ACETATE 80 MG: 80 INJECTION, SUSPENSION INTRA-ARTICULAR; INTRALESIONAL; INTRAMUSCULAR; SOFT TISSUE at 15:03

## 2023-07-19 NOTE — PROGRESS NOTES
Administrations This Visit       methylPREDNISolone acetate (DEPO-MEDROL) injection 40 mg       Admin Date  07/19/2023  15:02 Action  Given Dose  40 mg Route  IntraMUSCular Site  Deltoid Left Administered By  Rome Bowman MA    Ordering Provider: Anthony Franks DO    NDC: [address]-0    Lot#: [id number]    : [address]    Patient Supplied?: No              methylPREDNISolone acetate (DEPO-MEDROL) injection 80 mg       Admin Date  07/19/2023  15:03 Action  Given Dose  80 mg Route  IntraMUSCular Site  Deltoid Left Administered By  Rome Bowman MA    Ordering Provider: Anthony Franks DO    [address]: [address]-5    Lot#: [id number]    : [address]    Patient Supplied?: No

## 2023-07-19 NOTE — PROGRESS NOTES
Maxime Coello (:  1960) is a 59 y.o. male,Established patient, here for evaluation of the following chief complaint(s):  Poison Ivy (Symptoms started yesterday and he woke up with his eyes swollen shut today and rashes down his arms. Wants to discuss a medication that did not work for him. )          Subjective   SUBJECTIVE/OBJECTIVE:  HPI  Chief Complaint   Patient presents with    Poison Ivy     Symptoms started yesterday and he woke up with his eyes swollen shut today and rashes down his arms. Wants to discuss a medication that did not work for him. Pt presents with rash on face, legs, trunk and groin region that started yesterday. Swelling in groin region and around eyes. Pt also c/o recent increase in LE edema, worse as the day goes on. Weight is down from previous visit. Wt Readings from Last 3 Encounters:   23 234 lb 9.6 oz (106.4 kg)   23 243 lb 9.6 oz (110.5 kg)   23 240 lb (108.9 kg)     Pt also co ED. No relief with Viagra. Patient Active Problem List   Diagnosis    HTN (hypertension)    OA (osteoarthritis), multiple joints    ED (erectile dysfunction)    Chronic anxiety    Lumbar spinal stenosis    Depression    Post concussion syndrome    Fatigue    Abnormal laboratory test result, PSA mild elevated. Medication monitoring encounter    Sciatica of right side, intermittent. Hypercholesterolemia    Cerumen impaction    Obesity (BMI 30-39. 9)    IFG (impaired fasting glucose)    Cervical stenosis of spinal canal    Obstructive sleep apnea on CPAP    Muscle cramps    Dysfunction of right eustachian tube       Current Outpatient Medications   Medication Sig Dispense Refill    fluocinonide (LIDEX) 0.05 % cream Apply topically 2 times daily.  30 g 1    tadalafil (CIALIS) 20 MG tablet Take 1 tablet by mouth daily as needed for Erectile Dysfunction 30 tablet 1    furosemide (LASIX) 20 MG tablet Take 1 tablet by mouth daily 5 tablet 0    lisinopril

## 2023-07-20 ASSESSMENT — ENCOUNTER SYMPTOMS
GASTROINTESTINAL NEGATIVE: 1
RESPIRATORY NEGATIVE: 1

## 2023-08-25 NOTE — PROGRESS NOTES
[facility]      Dixie Torres         [id number]  8/28/2023   Chief Complaint   Patient presents with    Follow-up     1 1/2yr DILEEP f/u w/[place] download. Doing well. Has received his replacement PAP but has not set it up yet. Needs script for PAP supplies. Pt of Fish Concepcion PA-C    PAP Download:   Original or initial AHI: 36.2     Date of initial study: 1/19/2011      Compliant  83.3%     Noncompliant 16.7 %     PAP Type CPAP    Level  11 cmH2O   Avg Hrs/Day 5hrs 31mins  AHI: 0.1   Recorded compliance dates , 7/22/23  to 8/20/23   Machine/Mfg:   [] ResMed    [x] Respironics/[employer]   Interface:   [] Nasal    [] Nasal pillows   [x] FFM      Provider:      [x] SR-HME     []Minoo     [] Petty    [] Edouard Delaney    [] Schwietermans               [] [employer]      [] Adaptive    [] [address] [facility] [address]:      [] Other    Neck Size: 17  Mallampati 3  ESS:  5  SAQLI: 51    Here is a scan of the most recent download:            Presentation:   Jose Villagran presents for sleep medicine follow up for obstructive sleep apnea  Since the last visit, Jose Villagran is doing well with PAP. He received replacement PAP but has not started using it yet. He is sleeping well but does wake to urinate. He gets tired in the afternoon. He feels like his thinking gets foggy in the afternoon. He describes it more like fatigue  He is taking klonopin for insomnia with benefit, takes it about 3-4 nights a week. Equipment issues: The pressure is  acceptable, the mask is acceptable     Sleep issues:  Do you feel better? Yes  More rested? Yes   Better concentration? yes    Progress History:   Since last visit any new medical issues? Yes prostate cancer  New ER or hospital visits? No  Any new or changes in medicines? No  Any new sleep medicines? No    Review of Systems -   Review of Systems   Constitutional:  Negative for activity change, appetite change, chills and fever.    HENT:  Negative for

## 2023-08-28 ENCOUNTER — OFFICE VISIT (OUTPATIENT)
Dept: PULMONOLOGY | Age: 63
End: 2023-08-28
Payer: COMMERCIAL

## 2023-08-28 VITALS
HEIGHT: 70 IN | DIASTOLIC BLOOD PRESSURE: 76 MMHG | BODY MASS INDEX: 34.41 KG/M2 | OXYGEN SATURATION: 96 % | TEMPERATURE: 98.3 F | HEART RATE: 53 BPM | SYSTOLIC BLOOD PRESSURE: 138 MMHG | WEIGHT: 240.4 LBS

## 2023-08-28 DIAGNOSIS — G47.33 OSA ON CPAP: Primary | ICD-10-CM

## 2023-08-28 DIAGNOSIS — E66.9 OBESITY (BMI 30-39.9): ICD-10-CM

## 2023-08-28 DIAGNOSIS — G47.09 OTHER INSOMNIA: ICD-10-CM

## 2023-08-28 DIAGNOSIS — Z99.89 OSA ON CPAP: Primary | ICD-10-CM

## 2023-08-28 DIAGNOSIS — R53.83 OTHER FATIGUE: ICD-10-CM

## 2023-08-28 PROCEDURE — 99214 OFFICE O/P EST MOD 30 MIN: CPT | Performed by: PHYSICIAN ASSISTANT

## 2023-08-28 PROCEDURE — 3075F SYST BP GE 130 - 139MM HG: CPT | Performed by: PHYSICIAN ASSISTANT

## 2023-08-28 PROCEDURE — 3078F DIAST BP <80 MM HG: CPT | Performed by: PHYSICIAN ASSISTANT

## 2023-08-28 ASSESSMENT — ENCOUNTER SYMPTOMS
SHORTNESS OF BREATH: 0
EYES NEGATIVE: 1
ALLERGIC/IMMUNOLOGIC NEGATIVE: 1
CHEST TIGHTNESS: 0
WHEEZING: 0
DIARRHEA: 0
COUGH: 0
NAUSEA: 0
BACK PAIN: 0
STRIDOR: 0

## 2023-08-30 DIAGNOSIS — R53.83 OTHER FATIGUE: ICD-10-CM

## 2023-09-20 ENCOUNTER — TELEPHONE (OUTPATIENT)
Dept: UROLOGY | Age: 63
End: 2023-09-20

## 2023-10-03 DIAGNOSIS — F41.9 CHRONIC ANXIETY: ICD-10-CM

## 2023-10-03 RX ORDER — CLONAZEPAM 0.5 MG/1
TABLET ORAL
Qty: 180 TABLET | Refills: 1 | Status: SHIPPED | OUTPATIENT
Start: 2023-10-03 | End: 2024-03-31

## 2023-10-12 LAB — PSA, ULTRASENSITIVE: 14.8 NG/ML

## 2023-10-19 ENCOUNTER — HOSPITAL ENCOUNTER (OUTPATIENT)
Dept: UROLOGY | Age: 63
Discharge: HOME | End: 2023-10-19
Payer: COMMERCIAL

## 2023-10-19 VITALS
BODY MASS INDEX: 34.66 KG/M2 | HEART RATE: 53 BPM | SYSTOLIC BLOOD PRESSURE: 170 MMHG | RESPIRATION RATE: 18 BRPM | TEMPERATURE: 98.4 F | WEIGHT: 242.1 LBS | DIASTOLIC BLOOD PRESSURE: 82 MMHG | HEIGHT: 70 IN

## 2023-10-19 DIAGNOSIS — R97.20 ELEVATED PSA: Primary | ICD-10-CM

## 2023-10-19 LAB
BILIRUBIN URINE: NEGATIVE
BLOOD URINE, POC: NEGATIVE
CHARACTER, URINE: CLEAR
COLOR, URINE: YELLOW
GLUCOSE URINE: NEGATIVE MG/DL
KETONES, URINE: NEGATIVE
LEUKOCYTE CLUMPS, URINE: ABNORMAL
NITRITE, URINE: NEGATIVE
PH, URINE: 7 (ref 5–9)
PROTEIN, URINE: NEGATIVE MG/DL
SPECIFIC GRAVITY, URINE: 1.01 (ref 1–1.03)
UROBILINOGEN, URINE: 0.2 EU/DL (ref 0–1)

## 2023-10-19 PROCEDURE — 52000 CYSTOURETHROSCOPY: CPT

## 2023-10-19 NOTE — DISCHARGE INSTRUCTIONS
Discharge instructions: Cystoscopy  You May experience painful urination and see blood in the urine after your procedure. This should resolve over time. Pt ok to discharge home in good condition  No heavy lifting, >10 lbs for today  Pt should avoid strenuous activity for today  Pt should walk moderately at home  Pt ok to shower   Pt may resume diet as tolerated  Please call attending physician or hospital  with questions  Call or Present to ED if fever (> 101F), intractable nausea vomiting or pain. Complete PSA in 3 weeks. Follow up with Dr. Marysol Chavez in 4-6 weeks to review results. Office will call to schedule.

## 2023-10-19 NOTE — OP NOTE
Cystoscopy Operative Note  Surgeon: April Marie  Anesthesia: Urethral 2%  Indications: history of bladder and prostate cancer  Position: supine  EBL: 1 cc  Specimen: none  Findings:   The patient was prepped and draped in the usual sterile fashion. The flexible cystoscope was advanced through the urethra and into the bladder. The bladder was thoroughly inspected and the following was noted:    Residual Urine: Moderate   Urethra: No abnormalities of the urethra are noted. Prostate:  Medium gland (<80 gm) Complete obstruction by lateral  lobe of prostate. Bladder: No tumors or CIS noted. No bladder diverticulum. Mild- Moderate  trabeculation noted. Ureters: Clear efflux from both ureters. Orifices with normal configuration and location. The cystoscope was removed. The patient tolerated the procedure well.        PSA up to 14 from 9  Check PSA in 4-6 weeks and follow up to review

## 2023-10-19 NOTE — PROGRESS NOTES
Patient arrived in [facility]rology Center for Cystoscopy  This procedure has been fully reviewed with the patient and written informed consent has been obtained. 4599 Procedure started with Dr. Nuria Barba Procedure completed; patient tolerated well. Dr. Verónica Hughes talked to patient in length about procedure findings. Patient discharged. PLAN     Complete PSA in 3 weeks. Order given to patient. Follow up with Dr. Verónica Hughes in 4-6 weeks to review results. Office will call to schedule.

## 2023-11-25 LAB — PSA, ULTRASENSITIVE: 12.8 NG/ML

## 2023-11-30 ENCOUNTER — OFFICE VISIT (OUTPATIENT)
Dept: UROLOGY | Age: 63
End: 2023-11-30
Payer: COMMERCIAL

## 2023-11-30 VITALS — WEIGHT: 242 LBS | HEIGHT: 70 IN | RESPIRATION RATE: 16 BRPM | BODY MASS INDEX: 34.65 KG/M2

## 2023-11-30 DIAGNOSIS — Z85.51 HISTORY OF BLADDER CANCER: ICD-10-CM

## 2023-11-30 DIAGNOSIS — C61 PROSTATE CANCER (HCC): Primary | ICD-10-CM

## 2023-11-30 DIAGNOSIS — C67.2 MALIGNANT NEOPLASM OF LATERAL WALL OF URINARY BLADDER (HCC): ICD-10-CM

## 2023-11-30 DIAGNOSIS — R97.20 ELEVATED PSA: ICD-10-CM

## 2023-11-30 PROCEDURE — 99214 OFFICE O/P EST MOD 30 MIN: CPT | Performed by: UROLOGY

## 2024-03-18 ENCOUNTER — TELEPHONE (OUTPATIENT)
Dept: UROLOGY | Age: 64
End: 2024-03-18

## 2024-03-18 NOTE — TELEPHONE ENCOUNTER
Contacted patient for updated insurance information and patient stated he will call back with the information when he is able.

## 2024-03-27 ENCOUNTER — TELEPHONE (OUTPATIENT)
Dept: UROLOGY | Age: 64
End: 2024-03-27

## 2024-04-11 LAB — PSA, ULTRASENSITIVE: 11.3 NG/ML

## 2024-04-18 ENCOUNTER — HOSPITAL ENCOUNTER (OUTPATIENT)
Dept: UROLOGY | Age: 64
Discharge: HOME | End: 2024-04-18
Payer: COMMERCIAL

## 2024-04-18 VITALS
TEMPERATURE: 98.5 F | SYSTOLIC BLOOD PRESSURE: 160 MMHG | BODY MASS INDEX: 34.66 KG/M2 | HEIGHT: 70 IN | OXYGEN SATURATION: 96 % | WEIGHT: 242.1 LBS | RESPIRATION RATE: 16 BRPM | DIASTOLIC BLOOD PRESSURE: 76 MMHG | HEART RATE: 53 BPM

## 2024-04-18 DIAGNOSIS — C61 PROSTATE CANCER (HCC): Primary | ICD-10-CM

## 2024-04-18 LAB
BILIRUBIN URINE: NEGATIVE
BLOOD URINE, POC: NEGATIVE
CHARACTER, URINE: CLEAR
COLOR, URINE: YELLOW
GLUCOSE URINE: NEGATIVE MG/DL
KETONES, URINE: NEGATIVE
LEUKOCYTE CLUMPS, URINE: NEGATIVE
NITRITE, URINE: NEGATIVE
PH, URINE: 5.5 (ref 5–9)
PROTEIN, URINE: NEGATIVE MG/DL
SPECIFIC GRAVITY, URINE: 1.01 (ref 1–1.03)
UROBILINOGEN, URINE: 0.2 EU/DL (ref 0–1)

## 2024-04-18 PROCEDURE — 52000 CYSTOURETHROSCOPY: CPT

## 2024-04-18 NOTE — DISCHARGE INSTRUCTIONS
Discharge instructions: Cystoscopy  You May experience painful urination and see blood in the urine after your procedure.  This should resolve over time.      Pt ok to discharge home in good condition  No heavy lifting, >10 lbs for today  Pt should avoid strenuous activity for today  Pt should walk moderately at home  Pt ok to shower   Pt may resume diet as tolerated  Please call attending physician or hospital  with questions  Call or Present to ED if fever (> 101F), intractable nausea vomiting or pain.    Complete PSA in 6 months, follow up in office with Dr. Bashir to review results.  Office will call to schedule.     Surveillance cystoscopy scheduled 04/17/2025 at 8:15am

## 2024-04-18 NOTE — OP NOTE
Cystoscopy Operative Note  Surgeon: Lew Bashir  Anesthesia: Urethral 2%  Indications: history of bladder and prostate cancer  Position: supine  EBL: 1 cc  Specimen: none  Findings:   The patient was prepped and draped in the usual sterile fashion.  The flexible cystoscope was advanced through the urethra and into the bladder.  The bladder was thoroughly inspected and the following was noted:     Residual Urine:  Moderate   Urethra: No abnormalities of the urethra are noted.  Prostate:  Medium gland (<80 gm) Complete obstruction by lateral  lobe of prostate.  Bladder: No tumors or CIS noted.  No bladder diverticulum.   Mild- Moderate  trabeculation noted.  Ureters: Clear efflux from both ureters.  Orifices with normal configuration and location.  The cystoscope was removed.  The patient tolerated the procedure well.         PSA 14.8 10/2023  PSA 12.8 11/2023  PSA 11.3 4/2024    Check PSA q6 months    Can start Cystoscopy annually  No recurrence of Bladder ca since 12/2019

## 2024-04-18 NOTE — PROGRESS NOTES
Patient arrived in [facility]rology Center for Cystoscopy  This procedure has been fully reviewed with the patient and written informed consent has been obtained.  0959 Procedure started with Dr. Bashir  1000 Procedure completed; patient tolerated well.  Dr. Bashir talked to patient in length about procedure findings.  Patient discharged.    PLAN     Complete PSA in 6 months, follow up in office with Dr. Bashir to review results.  Office will call to schedule.     Surveillance cystoscopy scheduled 04/17/2025 at 8:15am

## 2024-07-23 DIAGNOSIS — I10 ESSENTIAL HYPERTENSION: ICD-10-CM

## 2024-07-24 RX ORDER — LISINOPRIL 20 MG/1
20 TABLET ORAL DAILY
Qty: 30 TABLET | Refills: 0 | Status: SHIPPED | OUTPATIENT
Start: 2024-07-24

## 2024-07-28 ASSESSMENT — PATIENT HEALTH QUESTIONNAIRE - PHQ9
8. MOVING OR SPEAKING SO SLOWLY THAT OTHER PEOPLE COULD HAVE NOTICED. OR THE OPPOSITE, BEING SO FIGETY OR RESTLESS THAT YOU HAVE BEEN MOVING AROUND A LOT MORE THAN USUAL: NOT AT ALL
3. TROUBLE FALLING OR STAYING ASLEEP: NEARLY EVERY DAY
SUM OF ALL RESPONSES TO PHQ QUESTIONS 1-9: 15
7. TROUBLE CONCENTRATING ON THINGS, SUCH AS READING THE NEWSPAPER OR WATCHING TELEVISION: SEVERAL DAYS
SUM OF ALL RESPONSES TO PHQ QUESTIONS 1-9: 15
2. FEELING DOWN, DEPRESSED OR HOPELESS: NEARLY EVERY DAY
SUM OF ALL RESPONSES TO PHQ9 QUESTIONS 1 & 2: 6
10. IF YOU CHECKED OFF ANY PROBLEMS, HOW DIFFICULT HAVE THESE PROBLEMS MADE IT FOR YOU TO DO YOUR WORK, TAKE CARE OF THINGS AT HOME, OR GET ALONG WITH OTHER PEOPLE: NOT DIFFICULT AT ALL
7. TROUBLE CONCENTRATING ON THINGS, SUCH AS READING THE NEWSPAPER OR WATCHING TELEVISION: SEVERAL DAYS
10. IF YOU CHECKED OFF ANY PROBLEMS, HOW DIFFICULT HAVE THESE PROBLEMS MADE IT FOR YOU TO DO YOUR WORK, TAKE CARE OF THINGS AT HOME, OR GET ALONG WITH OTHER PEOPLE: NOT DIFFICULT AT ALL
6. FEELING BAD ABOUT YOURSELF - OR THAT YOU ARE A FAILURE OR HAVE LET YOURSELF OR YOUR FAMILY DOWN: SEVERAL DAYS
5. POOR APPETITE OR OVEREATING: SEVERAL DAYS
SUM OF ALL RESPONSES TO PHQ QUESTIONS 1-9: 15
5. POOR APPETITE OR OVEREATING: SEVERAL DAYS
4. FEELING TIRED OR HAVING LITTLE ENERGY: NEARLY EVERY DAY
9. THOUGHTS THAT YOU WOULD BE BETTER OFF DEAD, OR OF HURTING YOURSELF: NOT AT ALL
3. TROUBLE FALLING OR STAYING ASLEEP: NEARLY EVERY DAY
1. LITTLE INTEREST OR PLEASURE IN DOING THINGS: NEARLY EVERY DAY
4. FEELING TIRED OR HAVING LITTLE ENERGY: NEARLY EVERY DAY
8. MOVING OR SPEAKING SO SLOWLY THAT OTHER PEOPLE COULD HAVE NOTICED. OR THE OPPOSITE - BEING SO FIDGETY OR RESTLESS THAT YOU HAVE BEEN MOVING AROUND A LOT MORE THAN USUAL: NOT AT ALL
2. FEELING DOWN, DEPRESSED OR HOPELESS: NEARLY EVERY DAY
9. THOUGHTS THAT YOU WOULD BE BETTER OFF DEAD, OR OF HURTING YOURSELF: NOT AT ALL
1. LITTLE INTEREST OR PLEASURE IN DOING THINGS: NEARLY EVERY DAY

## 2024-07-31 ENCOUNTER — OFFICE VISIT (OUTPATIENT)
Dept: FAMILY MEDICINE CLINIC | Age: 64
End: 2024-07-31
Payer: COMMERCIAL

## 2024-07-31 VITALS
HEART RATE: 56 BPM | RESPIRATION RATE: 16 BRPM | DIASTOLIC BLOOD PRESSURE: 60 MMHG | WEIGHT: 240.5 LBS | BODY MASS INDEX: 34.51 KG/M2 | SYSTOLIC BLOOD PRESSURE: 134 MMHG

## 2024-07-31 DIAGNOSIS — Z00.00 WELL ADULT HEALTH CHECK: Primary | ICD-10-CM

## 2024-07-31 DIAGNOSIS — N52.9 ERECTILE DYSFUNCTION, UNSPECIFIED ERECTILE DYSFUNCTION TYPE: ICD-10-CM

## 2024-07-31 DIAGNOSIS — C67.2 MALIGNANT NEOPLASM OF LATERAL WALL OF URINARY BLADDER (HCC): ICD-10-CM

## 2024-07-31 DIAGNOSIS — R60.0 LOWER LEG EDEMA: ICD-10-CM

## 2024-07-31 DIAGNOSIS — C61 PROSTATE CANCER (HCC): ICD-10-CM

## 2024-07-31 DIAGNOSIS — F41.9 CHRONIC ANXIETY: ICD-10-CM

## 2024-07-31 DIAGNOSIS — I10 ESSENTIAL HYPERTENSION: ICD-10-CM

## 2024-07-31 PROCEDURE — 99396 PREV VISIT EST AGE 40-64: CPT | Performed by: FAMILY MEDICINE

## 2024-07-31 PROCEDURE — 3078F DIAST BP <80 MM HG: CPT | Performed by: FAMILY MEDICINE

## 2024-07-31 PROCEDURE — 3075F SYST BP GE 130 - 139MM HG: CPT | Performed by: FAMILY MEDICINE

## 2024-07-31 RX ORDER — TADALAFIL 20 MG/1
20 TABLET ORAL DAILY PRN
Qty: 30 TABLET | Refills: 1 | Status: SHIPPED | OUTPATIENT
Start: 2024-07-31

## 2024-07-31 RX ORDER — LISINOPRIL 20 MG/1
20 TABLET ORAL DAILY
Qty: 90 TABLET | Refills: 3 | Status: SHIPPED | OUTPATIENT
Start: 2024-07-31

## 2024-07-31 SDOH — ECONOMIC STABILITY: FOOD INSECURITY: WITHIN THE PAST 12 MONTHS, YOU WORRIED THAT YOUR FOOD WOULD RUN OUT BEFORE YOU GOT MONEY TO BUY MORE.: NEVER TRUE

## 2024-07-31 SDOH — ECONOMIC STABILITY: INCOME INSECURITY: HOW HARD IS IT FOR YOU TO PAY FOR THE VERY BASICS LIKE FOOD, HOUSING, MEDICAL CARE, AND HEATING?: NOT HARD AT ALL

## 2024-07-31 SDOH — ECONOMIC STABILITY: FOOD INSECURITY: WITHIN THE PAST 12 MONTHS, THE FOOD YOU BOUGHT JUST DIDN'T LAST AND YOU DIDN'T HAVE MONEY TO GET MORE.: NEVER TRUE

## 2024-07-31 ASSESSMENT — ENCOUNTER SYMPTOMS
GASTROINTESTINAL NEGATIVE: 1
RESPIRATORY NEGATIVE: 1

## 2024-07-31 NOTE — PROGRESS NOTES
2024    Dmitri Caicedo (:  1960) is a 64 y.o. male, here for a preventive medicine evaluation.  Chief Complaint   Patient presents with    Annual Exam    Medication Refill     Annual eval.      Doing well overall.    Continues to follow closely with Urology.      BPs and weight stable.  BP Readings from Last 3 Encounters:   24 134/60   24 (!) 160/76   10/19/23 (!) 170/82     Wt Readings from Last 3 Encounters:   24 109.1 kg (240 lb 8 oz)   24 109.8 kg (242 lb 1.6 oz)   23 109.8 kg (242 lb)     Due for labs.    Subjective   Patient Active Problem List   Diagnosis    HTN (hypertension)    OA (osteoarthritis), multiple joints    ED (erectile dysfunction)    Chronic anxiety    Lumbar spinal stenosis    Depression    Post concussion syndrome    Fatigue    Abnormal laboratory test result, PSA mild elevated.    Medication monitoring encounter    Sciatica of right side, intermittent.    Hypercholesterolemia    Cerumen impaction    Obesity (BMI 30-39.9)    IFG (impaired fasting glucose)    Cervical stenosis of spinal canal    Obstructive sleep apnea on CPAP    Muscle cramps    Dysfunction of right eustachian tube    Malignant neoplasm of lateral wall of urinary bladder (HCC)    Prostate cancer (HCC)       Review of Systems   Constitutional: Negative.    HENT: Negative.     Respiratory: Negative.     Cardiovascular: Negative.    Gastrointestinal: Negative.    Musculoskeletal: Negative.    All other systems reviewed and are negative.      Prior to Visit Medications    Medication Sig Taking? Authorizing Provider   verapamil (CALAN SR) 180 MG extended release tablet TAKE 1 TABLET NIGHTLY Yes Gómez Santillan, DO   lisinopril (PRINIVIL;ZESTRIL) 20 MG tablet Take 1 tablet by mouth daily Yes Gómez Santillan, DO   tadalafil (CIALIS) 20 MG tablet Take 1 tablet by mouth daily as needed for Erectile Dysfunction Yes Gómez Santillan, DO   ibuprofen (IBU) 800 MG tablet

## 2024-08-01 LAB
BASOPHILS ABSOLUTE: 0.01 K/UL (ref 0–0.2)
BASOPHILS RELATIVE PERCENT: 0.2 %
EOSINOPHILS ABSOLUTE: 0.04 K/UL (ref 0–0.5)
EOSINOPHILS RELATIVE PERCENT: 0.9 %
ESTIMATED AVERAGE GLUCOSE: 103 MG/DL
HBA1C MFR BLD: 5.2 % (ref 4.2–5.6)
HCT VFR BLD CALC: 42 % (ref 40–51)
HEMOGLOBIN: 14.3 G/DL (ref 13.5–17)
IMMATURE GRANS (ABS): 0.01
IMMATURE GRANULOCYTES %: 0.2 %
LYMPHOCYTES ABSOLUTE: 1.4 K/UL (ref 1–4)
LYMPHOCYTES RELATIVE PERCENT: 32.9 %
MCH RBC QN AUTO: 34.6 PG (ref 25–33)
MCHC RBC AUTO-ENTMCNC: 34 G/DL (ref 31–36)
MCV RBC AUTO: 101.7 FL (ref 80–99)
MONOCYTES ABSOLUTE: 0.56 K/UL (ref 0.2–1)
MONOCYTES RELATIVE PERCENT: 13.2 %
NEUTROPHILS ABSOLUTE: 2.23 K/UL (ref 1.5–7.5)
NEUTROPHILS RELATIVE PERCENT: 52.6 %
PDW BLD-RTO: 11.8 % (ref 11.5–15)
PLATELET # BLD: 196 K/UL (ref 130–400)
PMV BLD AUTO: 10.4 FL (ref 9.3–13)
RBC # BLD: 4.13 M/UL (ref 4.5–6.1)
WBC # BLD: 4.3 K/UL (ref 3.5–11)

## 2024-08-02 LAB
ALBUMIN: 4.5 G/DL (ref 3.5–5.2)
ALK PHOSPHATASE: 54 U/L (ref 40–123)
ALT SERPL-CCNC: 27 U/L (ref 5–50)
ANION GAP SERPL CALCULATED.3IONS-SCNC: 12 MEQ/L (ref 7–16)
AST SERPL-CCNC: 24 U/L (ref 9–50)
BILIRUB SERPL-MCNC: 0.4 MG/DL
BUN BLDV-MCNC: 21 MG/DL (ref 8–23)
CALCIUM SERPL-MCNC: 9.9 MG/DL (ref 8.5–10.5)
CHLORIDE BLD-SCNC: 108 MEQ/L (ref 95–107)
CHOLESTEROL, TOTAL: 208 MG/DL
CHOLESTEROL/HDL RATIO: 2.8 RATIO
CO2: 24 MEQ/L (ref 19–31)
CREAT SERPL-MCNC: 1.06 MG/DL (ref 0.8–1.4)
EGFR IF NONAFRICAN AMERICAN: 79 ML/MIN/1.73
GLUCOSE: 96 MG/DL (ref 70–99)
HDLC SERPL-MCNC: 73 MG/DL
LDL CHOLESTEROL: 125 MG/DL
LDL/HDL RATIO: 1.7 RATIO
POTASSIUM SERPL-SCNC: 4.7 MEQ/L (ref 3.5–5.4)
SODIUM BLD-SCNC: 144 MEQ/L (ref 133–146)
TOTAL PROTEIN: 6.7 G/DL (ref 6.1–8.3)
TRIGL SERPL-MCNC: 48 MG/DL
TSH SERPL DL<=0.05 MIU/L-ACNC: 2.2 UIU/ML (ref 0.4–4.1)
VLDLC SERPL CALC-MCNC: 10 MG/DL

## 2024-08-26 ENCOUNTER — OFFICE VISIT (OUTPATIENT)
Dept: PULMONOLOGY | Age: 64
End: 2024-08-26
Payer: COMMERCIAL

## 2024-08-26 VITALS
WEIGHT: 240.4 LBS | DIASTOLIC BLOOD PRESSURE: 68 MMHG | TEMPERATURE: 97.5 F | HEIGHT: 70 IN | OXYGEN SATURATION: 96 % | SYSTOLIC BLOOD PRESSURE: 128 MMHG | HEART RATE: 56 BPM | BODY MASS INDEX: 34.41 KG/M2

## 2024-08-26 DIAGNOSIS — G47.09 OTHER INSOMNIA: ICD-10-CM

## 2024-08-26 DIAGNOSIS — G47.33 OSA ON CPAP: Primary | ICD-10-CM

## 2024-08-26 DIAGNOSIS — E66.9 OBESITY (BMI 30-39.9): ICD-10-CM

## 2024-08-26 DIAGNOSIS — R53.83 OTHER FATIGUE: ICD-10-CM

## 2024-08-26 PROCEDURE — 3074F SYST BP LT 130 MM HG: CPT | Performed by: PHYSICIAN ASSISTANT

## 2024-08-26 PROCEDURE — G8427 DOCREV CUR MEDS BY ELIG CLIN: HCPCS | Performed by: PHYSICIAN ASSISTANT

## 2024-08-26 PROCEDURE — G8417 CALC BMI ABV UP PARAM F/U: HCPCS | Performed by: PHYSICIAN ASSISTANT

## 2024-08-26 PROCEDURE — 3017F COLORECTAL CA SCREEN DOC REV: CPT | Performed by: PHYSICIAN ASSISTANT

## 2024-08-26 PROCEDURE — 3078F DIAST BP <80 MM HG: CPT | Performed by: PHYSICIAN ASSISTANT

## 2024-08-26 PROCEDURE — 1036F TOBACCO NON-USER: CPT | Performed by: PHYSICIAN ASSISTANT

## 2024-08-26 PROCEDURE — 99213 OFFICE O/P EST LOW 20 MIN: CPT | Performed by: PHYSICIAN ASSISTANT

## 2024-08-26 ASSESSMENT — ENCOUNTER SYMPTOMS
CHEST TIGHTNESS: 0
SHORTNESS OF BREATH: 0
COUGH: 0
BACK PAIN: 0
DIARRHEA: 0
STRIDOR: 0
NAUSEA: 0
ALLERGIC/IMMUNOLOGIC NEGATIVE: 1
WHEEZING: 0

## 2024-08-26 NOTE — PROGRESS NOTES
[facility]      Dmitri Caicedo         [id number]  8/26/2024   Chief Complaint   Patient presents with    Follow-up     Sukumar 1 yr         Pt of Karina Best    PAP Download:   Original or initial AHI: 36.2     Date of initial study: 1.19.2011      Compliant  86.7%     Noncompliant 13.3%     PAP Type cpap Level  11   Avg Hrs/Day 6 hr 44min  AHI: 0.1   Recorded compliance dates , 7.23.24  to 8.21.24   Machine/Mfg:   [] ResMed    [x] Respironics/[employer]   Interface:   [] Nasal    [] Nasal pillows   [x] FFM      Provider:      [x] SR-HME     [][employer]     [] [employer]    [] [employer]    [] Schwietermans               [] P[employer]R Medical      [] Adaptive    [] [place]:      [] Other    Neck Size: 17  Mallampati 3  ESS:  10  SAQLI: 43    Here is a scan of the most recent download:            Presentation:   Dmitri presents for sleep medicine follow up for obstructive sleep apnea  Since the last visit, Dmitri is doing ok with PAP but hates it.  He had labs done last year to work up fatigue and all were normal. He gets tired during the day.     Equipment issues:  The pressure is  acceptable, the mask is acceptable     Sleep issues:  Do you feel better? Yes  More rested?Yes   Better concentration? yes    Progress History:   Since last visit any new medical issues? No  New ER or hospital visits? No      Review of Systems -   Review of Systems   Constitutional:  Positive for fatigue. Negative for activity change, appetite change, chills and fever.   HENT:  Negative for congestion and postnasal drip.    Eyes:  Positive for visual disturbance.   Respiratory:  Negative for cough, chest tightness, shortness of breath, wheezing and stridor.    Cardiovascular:  Negative for chest pain and leg swelling.   Gastrointestinal:  Negative for diarrhea and nausea.   Endocrine: Negative.    Genitourinary: Negative.    Musculoskeletal: Negative.  Negative for arthralgias and back pain.   Skin:

## 2024-11-05 LAB — PSA, ULTRASENSITIVE: 10.5 NG/ML (ref 0–4)

## 2024-11-07 ENCOUNTER — OFFICE VISIT (OUTPATIENT)
Dept: UROLOGY | Age: 64
End: 2024-11-07

## 2024-11-07 VITALS — WEIGHT: 242 LBS | RESPIRATION RATE: 10 BRPM | BODY MASS INDEX: 34.65 KG/M2 | HEIGHT: 70 IN

## 2024-11-07 DIAGNOSIS — C67.2 MALIGNANT NEOPLASM OF LATERAL WALL OF URINARY BLADDER (HCC): ICD-10-CM

## 2024-11-07 DIAGNOSIS — Z85.51 HISTORY OF BLADDER CANCER: ICD-10-CM

## 2024-11-07 DIAGNOSIS — C61 PROSTATE CANCER (HCC): Primary | ICD-10-CM

## 2024-11-07 DIAGNOSIS — R97.20 ELEVATED PSA: ICD-10-CM

## 2024-11-07 NOTE — PROGRESS NOTES
TALA NASH MD        [facility] [place] [facility]  [facility] UROLOGY  [address]  [address]  [facility] [address]  Dept: [phone]  Dept Fax: [phone]  Loc: [phone]      [facility] Urology Office Note -     Patient:  Dmitri Caicedo  YOB: 1960    The patient is a 64 y.o. male who presents today for evaluation of the following problems:   Chief Complaint   Patient presents with    Prostate Cancer        HISTORY OF PRESENT ILLNESS:     Prostate Cancer  New diagnosis      FINAL DIAGNOSIS:   A: Right apex of prostate, core needle biopsy:             Negative for malignancy.   B: Right mid prostate, core needle biopsy:             Negative for malignancy.   C: Right base of prostate, core needle biopsy:             Negative for malignancy.   D: Left apex of prostate, core needle biopsy:             Prostatic adenocarcinoma, acinar type, Roswell score 3+3 = 6   (grade group 1 of 5).             Carcinoma is present within 1 of 1 submitted core.  (11 mm /   20 mm; 55%)   E: Left mid prostate, core needle biopsy:             Prostatic adenocarcinoma, acinar type, Roswell score 3+3 = 6   (grade group 1 of 5).             Carcinoma is present within 1 of 1 submitted core.  (1 mm /   17 mm; 6%)   F: Left base of prostate, core needle biopsy:             Negative for malignancy.   G: Prostate lesion #1, core needle biopsies:             Negative for malignancy.   H: Prostate lesion #2, core needle biopsies:             Prostatic adenocarcinoma, acinar type, Aguila score 3+3 = 6   (grade group 1 of 5).             Carcinoma is present within 2 of 3 submitted cores.  (7 mm /   45 mm; 16%)   I: Prostate lesion #3, core needle biopsies:     Necrotizing granulomatous inflammation.     Prostatic parenchyma is not identified.     Negative for organisms and malignancy.     Prostate size: 50 cc  9/28/2021 Free PSA 22.4      PSA's  4/2023 9.93   10/2023

## 2025-04-04 ENCOUNTER — TELEPHONE (OUTPATIENT)
Dept: UROLOGY | Age: 65
End: 2025-04-04

## 2025-04-29 LAB — PSA, ULTRASENSITIVE: 14.2 NG/ML (ref 0–4)

## 2025-05-01 ENCOUNTER — HOSPITAL ENCOUNTER (OUTPATIENT)
Dept: UROLOGY | Age: 65
Discharge: HOME | End: 2025-05-01
Payer: COMMERCIAL

## 2025-05-01 VITALS
TEMPERATURE: 97.7 F | HEART RATE: 53 BPM | SYSTOLIC BLOOD PRESSURE: 160 MMHG | WEIGHT: 234.7 LBS | DIASTOLIC BLOOD PRESSURE: 83 MMHG | BODY MASS INDEX: 33.6 KG/M2 | RESPIRATION RATE: 16 BRPM | HEIGHT: 70 IN | OXYGEN SATURATION: 96 %

## 2025-05-01 DIAGNOSIS — C61 PROSTATE CANCER (HCC): Primary | ICD-10-CM

## 2025-05-01 LAB
BILIRUBIN, URINE: NEGATIVE
BLOOD URINE, POC: ABNORMAL
CHARACTER, URINE: ABNORMAL
COLOR, UA: ABNORMAL
GLUCOSE URINE: NEGATIVE MG/DL
KETONES, URINE: NEGATIVE
LEUKOCYTE CLUMPS, URINE: ABNORMAL
NITRITE, URINE: NEGATIVE
PH, URINE: 6.5 (ref 5–9)
PROTEIN, URINE: ABNORMAL MG/DL
SPECIFIC GRAVITY UA: 1.01 (ref 1–1.03)
UROBILINOGEN, URINE: 0.2 EU/DL (ref 0–1)

## 2025-05-01 PROCEDURE — 52000 CYSTOURETHROSCOPY: CPT

## 2025-05-01 PROCEDURE — 87086 URINE CULTURE/COLONY COUNT: CPT

## 2025-05-01 NOTE — PROGRESS NOTES
Patient arrived in [facility] for Cystoscopy  This procedure has been fully reviewed with the patient and written informed consent has been obtained.  0904 Procedure started with Dr. Bashir  0905 Procedure completed; patient tolerated well.  Dr. Bashir talked to patient in length about procedure findings.  Patient discharged.    PLAN     Urine culture and urine cytology sent today, office will call to schedule results review    Complete PSA in 6 months, order given    Surveillance cystoscopy scheduled 04/30/2026 at 8:15am

## 2025-05-01 NOTE — DISCHARGE INSTRUCTIONS
Discharge instructions: Cystoscopy  You May experience painful urination and see blood in the urine after your procedure.  This should resolve over time.      Pt ok to discharge home in good condition  No heavy lifting, >10 lbs for today  Pt should avoid strenuous activity for today  Pt should walk moderately at home  Pt ok to shower   Pt may resume diet as tolerated  Please call attending physician or hospital  with questions  Call or Present to ED if fever (> 101F), intractable nausea vomiting or pain.    Urine culture and urine cytology sent today, office will call to schedule results review    Complete PSA in 6 months, order given    Surveillance cystoscopy scheduled 04/30/2026 at 8:15am

## 2025-05-01 NOTE — OP NOTE
Cystoscopy Operative Note  Surgeon: Lew Bashir  Anesthesia: Urethral 2%  Indications: history of bladder and prostate cancer  Position: supine  EBL: 1 cc  Specimen: none  Findings:   The patient was prepped and draped in the usual sterile fashion.  The flexible cystoscope was advanced through the urethra and into the bladder.  The bladder was thoroughly inspected and the following was noted:     Residual Urine:  Moderate   Urethra: No abnormalities of the urethra are noted.  Prostate:  Medium gland (<80 gm) Complete obstruction by lateral  lobe of prostate.  Bladder: No tumors or CIS noted.  No bladder diverticulum.   Mild- Moderate  trabeculation noted.  Some spotty areas of erythema, resembling recent infection, no tumors noted  Ureters: Clear efflux from both ureters.  Orifices with normal configuration and location.  The cystoscope was removed.  The patient tolerated the procedure well.         PSA 14.8 10/2023; 4/2025 14.2  PSA 12.8 11/2023  PSA 11.3 4/2024     Discussed repeat MRI  He would like to skip this, check PSA in 6 months  Check PSA q6 months     Check cytology and UCx  Will have admin call with results  continues Cystoscopy annually  No recurrence of Bladder ca since 12/2019

## 2025-05-03 LAB — BACTERIA UR CULT: NORMAL

## 2025-05-06 ENCOUNTER — TELEPHONE (OUTPATIENT)
Dept: UROLOGY | Age: 65
End: 2025-05-06

## 2025-05-06 ENCOUNTER — HOSPITAL ENCOUNTER (OUTPATIENT)
Dept: CT IMAGING | Age: 65
Discharge: HOME | End: 2025-05-06
Payer: COMMERCIAL

## 2025-05-06 DIAGNOSIS — C68.9 UROTHELIAL CARCINOMA (HCC): Primary | ICD-10-CM

## 2025-05-06 DIAGNOSIS — C68.9 UROTHELIAL CARCINOMA (HCC): ICD-10-CM

## 2025-05-06 LAB — POC CREATININE WHOLE BLOOD: 1 MG/DL (ref 0.5–1.2)

## 2025-05-06 PROCEDURE — 82565 ASSAY OF CREATININE: CPT

## 2025-05-06 PROCEDURE — 6360000004 HC RX CONTRAST MEDICATION: Performed by: NURSE PRACTITIONER

## 2025-05-06 PROCEDURE — 74178 CT ABD&PLV WO CNTR FLWD CNTR: CPT | Performed by: NURSE PRACTITIONER

## 2025-05-06 RX ORDER — IOPAMIDOL 755 MG/ML
85 INJECTION, SOLUTION INTRAVASCULAR
Status: COMPLETED | OUTPATIENT
Start: 2025-05-06 | End: 2025-05-06

## 2025-05-06 RX ADMIN — IOPAMIDOL 85 ML: 755 INJECTION, SOLUTION INTRAVENOUS at 12:41

## 2025-05-06 NOTE — TELEPHONE ENCOUNTER
Pt's urine cytology abnormal.  Reviewed with Dr. Bashir.  Please facilitate timely CTU and appt w myself or Dr. Bashir to review results.

## 2025-05-08 ENCOUNTER — OFFICE VISIT (OUTPATIENT)
Dept: UROLOGY | Age: 65
End: 2025-05-08
Payer: COMMERCIAL

## 2025-05-08 VITALS
DIASTOLIC BLOOD PRESSURE: 78 MMHG | WEIGHT: 238 LBS | BODY MASS INDEX: 34.07 KG/M2 | HEIGHT: 70 IN | RESPIRATION RATE: 18 BRPM | SYSTOLIC BLOOD PRESSURE: 140 MMHG

## 2025-05-08 DIAGNOSIS — C61 PROSTATE CANCER (HCC): ICD-10-CM

## 2025-05-08 DIAGNOSIS — C67.9 MALIGNANT NEOPLASM OF URINARY BLADDER, UNSPECIFIED SITE (HCC): Primary | ICD-10-CM

## 2025-05-08 PROCEDURE — G8417 CALC BMI ABV UP PARAM F/U: HCPCS | Performed by: NURSE PRACTITIONER

## 2025-05-08 PROCEDURE — 1036F TOBACCO NON-USER: CPT | Performed by: NURSE PRACTITIONER

## 2025-05-08 PROCEDURE — 3077F SYST BP >= 140 MM HG: CPT | Performed by: NURSE PRACTITIONER

## 2025-05-08 PROCEDURE — 3078F DIAST BP <80 MM HG: CPT | Performed by: NURSE PRACTITIONER

## 2025-05-08 PROCEDURE — 3017F COLORECTAL CA SCREEN DOC REV: CPT | Performed by: NURSE PRACTITIONER

## 2025-05-08 PROCEDURE — 99214 OFFICE O/P EST MOD 30 MIN: CPT | Performed by: NURSE PRACTITIONER

## 2025-05-08 PROCEDURE — G8427 DOCREV CUR MEDS BY ELIG CLIN: HCPCS | Performed by: NURSE PRACTITIONER

## 2025-05-08 ASSESSMENT — ENCOUNTER SYMPTOMS
NAUSEA: 0
VOMITING: 0
ABDOMINAL PAIN: 0
BACK PAIN: 0

## 2025-05-08 NOTE — PROGRESS NOTES
[facility] [place] [facility]  [facility] UROLOGY  [address]  [address]  [facility] [address]  Dept: [phone]  Loc: [phone]    Visit Date: 5/8/2025        HPI:     Dmitri Caicedo is a 64 y.o. male who presents today for:  Chief Complaint   Patient presents with    Prostate Cancer    Results     CT and Cytology and to discuss option.       HPI  Pt seen to review cytology and ct results    Mr. 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    Underwent 6 week treatment of BCG completed in 2020.    Has not had recurrent since.   Surveillance cystoscopy 5/1/25 negative.   Urine Cytology completed 5/1/25 positive for HG UC.   CTU 5/6/25 with stable mild bladder wall thickening and mild prostatomegaly.      Also has hx of prostate cancer.   MRI fusion prostate biopsy 12/23/21 (PSA 10.06) by Dr. To with GS 3+3=6 prostate cancer.    PSA 4/28/25 14.2.   Repeat MRI discussed at ov 5/1 w Dr. Bashir-pt opted to hold off on MRI and repeat PSA in 6 months.   Repeat PSA due in Aug/sept 2025      Current Outpatient Medications   Medication Sig Dispense Refill    verapamil (CALAN SR) 180 MG extended release tablet TAKE 1 TABLET NIGHTLY 90 tablet 3    lisinopril (PRINIVIL;ZESTRIL) 20 MG tablet Take 1 tablet by mouth daily 90 tablet 3    tadalafil (CIALIS) 20 MG tablet Take 1 tablet by mouth daily as needed for Erectile Dysfunction 30 tablet 1    ibuprofen (IBU) 800 MG tablet TAKE 1 TABLET EVERY 6 HOURS AS NEEDED 360 tablet 3    Multiple Vitamins-Minerals (MULTIVITAMIN MEN PO) Take by mouth      Red Yeast Rice 600 MG CAPS Take 1 capsule by mouth daily Take by mouth 2 times daily 90 capsule 3    fish oil-omega-3 fatty acids 1000 MG capsule Take 2 capsules by mouth Two tablets twice a day      clonazePAM (KLONOPIN) 0.5 MG tablet TAKE 1 TABLET TWICE A DAY  AS NEEDED FOR ANXIETY FOR  UP  DAYS 180 tablet 1     No current facility-administered

## 2025-05-13 ENCOUNTER — OFFICE VISIT (OUTPATIENT)
Dept: FAMILY MEDICINE CLINIC | Age: 65
End: 2025-05-13
Payer: COMMERCIAL

## 2025-05-13 VITALS
RESPIRATION RATE: 18 BRPM | BODY MASS INDEX: 34.65 KG/M2 | HEART RATE: 62 BPM | SYSTOLIC BLOOD PRESSURE: 126 MMHG | DIASTOLIC BLOOD PRESSURE: 68 MMHG | WEIGHT: 241.5 LBS

## 2025-05-13 DIAGNOSIS — M51.360 DEGENERATION OF INTERVERTEBRAL DISC OF LUMBAR REGION WITH DISCOGENIC BACK PAIN: ICD-10-CM

## 2025-05-13 DIAGNOSIS — C67.2 MALIGNANT NEOPLASM OF LATERAL WALL OF URINARY BLADDER (HCC): ICD-10-CM

## 2025-05-13 DIAGNOSIS — C61 PROSTATE CANCER (HCC): ICD-10-CM

## 2025-05-13 DIAGNOSIS — M25.561 CHRONIC PAIN OF RIGHT KNEE: Primary | ICD-10-CM

## 2025-05-13 DIAGNOSIS — G89.29 CHRONIC PAIN OF RIGHT KNEE: Primary | ICD-10-CM

## 2025-05-13 PROCEDURE — 3074F SYST BP LT 130 MM HG: CPT | Performed by: FAMILY MEDICINE

## 2025-05-13 PROCEDURE — 3017F COLORECTAL CA SCREEN DOC REV: CPT | Performed by: FAMILY MEDICINE

## 2025-05-13 PROCEDURE — 99214 OFFICE O/P EST MOD 30 MIN: CPT | Performed by: FAMILY MEDICINE

## 2025-05-13 PROCEDURE — G8417 CALC BMI ABV UP PARAM F/U: HCPCS | Performed by: FAMILY MEDICINE

## 2025-05-13 PROCEDURE — 1036F TOBACCO NON-USER: CPT | Performed by: FAMILY MEDICINE

## 2025-05-13 PROCEDURE — G8427 DOCREV CUR MEDS BY ELIG CLIN: HCPCS | Performed by: FAMILY MEDICINE

## 2025-05-13 PROCEDURE — 3078F DIAST BP <80 MM HG: CPT | Performed by: FAMILY MEDICINE

## 2025-05-13 SDOH — ECONOMIC STABILITY: FOOD INSECURITY: WITHIN THE PAST 12 MONTHS, YOU WORRIED THAT YOUR FOOD WOULD RUN OUT BEFORE YOU GOT MONEY TO BUY MORE.: NEVER TRUE

## 2025-05-13 SDOH — ECONOMIC STABILITY: FOOD INSECURITY: WITHIN THE PAST 12 MONTHS, THE FOOD YOU BOUGHT JUST DIDN'T LAST AND YOU DIDN'T HAVE MONEY TO GET MORE.: NEVER TRUE

## 2025-05-13 ASSESSMENT — ENCOUNTER SYMPTOMS
GASTROINTESTINAL NEGATIVE: 1
BACK PAIN: 1
RESPIRATORY NEGATIVE: 1

## 2025-05-13 ASSESSMENT — PATIENT HEALTH QUESTIONNAIRE - PHQ9
2. FEELING DOWN, DEPRESSED OR HOPELESS: SEVERAL DAYS
8. MOVING OR SPEAKING SO SLOWLY THAT OTHER PEOPLE COULD HAVE NOTICED. OR THE OPPOSITE, BEING SO FIGETY OR RESTLESS THAT YOU HAVE BEEN MOVING AROUND A LOT MORE THAN USUAL: NOT AT ALL
SUM OF ALL RESPONSES TO PHQ QUESTIONS 1-9: 7
6. FEELING BAD ABOUT YOURSELF - OR THAT YOU ARE A FAILURE OR HAVE LET YOURSELF OR YOUR FAMILY DOWN: SEVERAL DAYS
SUM OF ALL RESPONSES TO PHQ QUESTIONS 1-9: 7
SUM OF ALL RESPONSES TO PHQ QUESTIONS 1-9: 7
9. THOUGHTS THAT YOU WOULD BE BETTER OFF DEAD, OR OF HURTING YOURSELF: NOT AT ALL
1. LITTLE INTEREST OR PLEASURE IN DOING THINGS: NOT AT ALL
10. IF YOU CHECKED OFF ANY PROBLEMS, HOW DIFFICULT HAVE THESE PROBLEMS MADE IT FOR YOU TO DO YOUR WORK, TAKE CARE OF THINGS AT HOME, OR GET ALONG WITH OTHER PEOPLE: NOT DIFFICULT AT ALL
SUM OF ALL RESPONSES TO PHQ QUESTIONS 1-9: 7
5. POOR APPETITE OR OVEREATING: SEVERAL DAYS
3. TROUBLE FALLING OR STAYING ASLEEP: SEVERAL DAYS
4. FEELING TIRED OR HAVING LITTLE ENERGY: NEARLY EVERY DAY
7. TROUBLE CONCENTRATING ON THINGS, SUCH AS READING THE NEWSPAPER OR WATCHING TELEVISION: NOT AT ALL

## 2025-05-13 NOTE — PROGRESS NOTES
Dmitri Caicedo (:  1960) is a 64 y.o. male,Established patient, here for evaluation of the following chief complaint(s):  OTHER (Multiple concerns)          Subjective   SUBJECTIVE/OBJECTIVE:  HPI  Chief Complaint   Patient presents with    OTHER     Multiple concerns     Pt presents today with several concerns.    Pt has been having issues in his right knee so he was seen at [facility].  Shot given at that time which seemed to help.     After that, he slipped on the ice and injured his back.  He mentioned this to [facility] who \"brushed me off\".  Denies radicular symptoms.    Recent CT for his bladder showed some thickening.  Urine cytology + for cancer.    Has questions regarding incidental findings on the CT.    Patient Active Problem List   Diagnosis    HTN (hypertension)    OA (osteoarthritis), multiple joints    ED (erectile dysfunction)    Chronic anxiety    Lumbar spinal stenosis    Depression    Post concussion syndrome    Fatigue    Abnormal laboratory test result, PSA mild elevated.    Medication monitoring encounter    Sciatica of right side, intermittent.    Hypercholesterolemia    Cerumen impaction    Obesity (BMI 30-39.9)    IFG (impaired fasting glucose)    Cervical stenosis of spinal canal    Obstructive sleep apnea on CPAP    Muscle cramps    Dysfunction of right eustachian tube    Malignant neoplasm of lateral wall of urinary bladder (HCC)    Prostate cancer (HCC)       Current Outpatient Medications   Medication Sig Dispense Refill    verapamil (CALAN SR) 180 MG extended release tablet TAKE 1 TABLET NIGHTLY 90 tablet 3    lisinopril (PRINIVIL;ZESTRIL) 20 MG tablet Take 1 tablet by mouth daily 90 tablet 3    tadalafil (CIALIS) 20 MG tablet Take 1 tablet by mouth daily as needed for Erectile Dysfunction 30 tablet 1    clonazePAM (KLONOPIN) 0.5 MG tablet TAKE 1 TABLET TWICE A DAY  AS NEEDED FOR ANXIETY FOR  UP  DAYS 180 tablet 1    ibuprofen (IBU) 800 MG tablet TAKE 1 TABLET EVERY 6 HOURS AS

## 2025-05-13 NOTE — PATIENT INSTRUCTIONS
You may receive a survey about your visit with us today. The feedback from our patients helps us identify what is working well and where the service to all patients can be enhanced. Thank you! You may receive a survey about your visit with us today. The feedback from our patients helps us identify what is working well and where the service to all patients can be enhanced. Thank you!

## 2025-05-20 ENCOUNTER — TELEPHONE (OUTPATIENT)
Dept: UROLOGY | Age: 65
End: 2025-05-20

## 2025-05-20 DIAGNOSIS — C67.9 MALIGNANT NEOPLASM OF URINARY BLADDER, UNSPECIFIED SITE (HCC): Primary | ICD-10-CM

## 2025-05-20 DIAGNOSIS — Z01.818 PRE-OP TESTING: ICD-10-CM

## 2025-05-21 DIAGNOSIS — C67.9 MALIGNANT NEOPLASM OF URINARY BLADDER, UNSPECIFIED SITE (HCC): Primary | ICD-10-CM

## 2025-05-21 DIAGNOSIS — Z01.818 PRE-OP TESTING: ICD-10-CM

## 2025-05-21 DIAGNOSIS — C61 PROSTATE CANCER (HCC): ICD-10-CM

## 2025-05-21 NOTE — TELEPHONE ENCOUNTER
Patient is scheduled for surgery with  on 6/24/25. Surgery consent to be done on arrival. Patient states he/she does not follow with a cardiologist. Patient to do pre op fasting labs, ekg and chest xray now. Pre op  urine to be done on 6/10/25.  Surgery instructions mailed to the patient.       Patient informed an adult over the age of 18 must be with them at the time of surgery and upon discharge

## 2025-05-21 NOTE — TELEPHONE ENCOUNTER
DO NOT TAKE  FISH OIL, MOBIC, IBUPROFEN, MOTRIN-LIKE DRUGS AND ANY MULTIVITAMINS OR OVER THE COUNTER SUPPLEMENTS 14 DAYS PRIOR TO SURGERY.    HOLD ASPIRIN 5 DAYS PRIOR TO SURGERY    IF YOU TAKE THE LISINOPRIL IN THE PM HOLD THE EVENING BEFORE SURGERY-IF YOU TAKE IN THE AM HOLD THE MORNING OF SURGERY     IF YOU TAKE GLUCOPHAGE, TRADJENTA, METFORMIN OR JANUMET, HOLD 2 DAYS PRIOR TO SURGERY    MUST HAVE AN ADULT OVER THE AGE OF 18 WITH YOU AT THE TIME OF THE DISCHARGE         Dmitri Caicedo 1960     Surgical Physician: Dr. Bashir      You have been scheduled for the procedure marked below:      Surgery: Cystoscopy, Bilateral Retrograde Pyelogram, Possible Biopsy and Fulguration          Date: 6/24/25     Anesthesia:  General     Place of Service: [facility] --Second Floor Same Day Surgery         CALL SURGERY DESK [phone], THE DAY PRIOR TO SURGERY BETWEEN 8AM-4PM, FOR ARRIVAL TIME (IF SURGERY IS ON A MONDAY, CALL THE FRIDAY PRIOR)       INSTRUCTIONS AS MARKED BELOW:    1.  DO NOT eat or drink anything after midnight before surgery.   2.  We prefer you shower or bathe with an antibacterial soap (Dial) the morning of surgery.  3  Please bring a current medication list, photo ID and insurance card(s) with you  4. Okay to take Tylenol  5. Take blood pressure or heart medication as directed, if taken in the morning take with a small sip of water  6.The office will call you in 1-2 days after your procedure to schedule a follow up.    DATE SENSITIVE PRE OP TESTING:    TO AVOID YOUR SURGERY BEING CANCELLED, DO TESTING ON THE DATE LISTED (*WALK IN* NO APPOINTMENT NEEDED).      DO THE PRE OP FASTING LABS, CHEST XRAY AND EKG NOW. ORDERS INCLUDED    DO THE PRE OP URINE CULTURE ON 6/10/25. ORDERS INCLUDED        Date: 5/21/2025

## 2025-05-27 ENCOUNTER — TELEPHONE (OUTPATIENT)
Dept: UROLOGY | Age: 65
End: 2025-05-27

## 2025-05-28 ENCOUNTER — HOSPITAL ENCOUNTER (OUTPATIENT)
Age: 65
Discharge: HOME | End: 2025-05-28
Payer: COMMERCIAL

## 2025-05-28 ENCOUNTER — HOSPITAL ENCOUNTER (OUTPATIENT)
Dept: GENERAL RADIOLOGY | Age: 65
Discharge: HOME | End: 2025-05-28
Payer: COMMERCIAL

## 2025-05-28 DIAGNOSIS — Z01.818 PRE-OP TESTING: ICD-10-CM

## 2025-05-28 DIAGNOSIS — C67.9 MALIGNANT NEOPLASM OF URINARY BLADDER, UNSPECIFIED SITE (HCC): ICD-10-CM

## 2025-05-28 LAB
EKG ATRIAL RATE: 51 BPM
EKG P AXIS: 31 DEGREES
EKG P-R INTERVAL: 150 MS
EKG Q-T INTERVAL: 432 MS
EKG QRS DURATION: 94 MS
EKG QTC CALCULATION (BAZETT): 398 MS
EKG R AXIS: 7 DEGREES
EKG T AXIS: 36 DEGREES
EKG VENTRICULAR RATE: 51 BPM

## 2025-05-28 PROCEDURE — 71046 X-RAY EXAM CHEST 2 VIEWS: CPT

## 2025-05-28 PROCEDURE — 93005 ELECTROCARDIOGRAM TRACING: CPT

## 2025-05-29 LAB
ANION GAP SERPL CALCULATED.3IONS-SCNC: 12 MMOL/L (ref 7–16)
BASOPHILS ABSOLUTE: 0.02 K/UL (ref 0–0.2)
BASOPHILS RELATIVE PERCENT: 0.5 % (ref 0–2)
BUN BLDV-MCNC: 19 MG/DL (ref 8–23)
CALCIUM SERPL-MCNC: 9.3 MG/DL (ref 8.6–10.5)
CHLORIDE BLD-SCNC: 107 MMOL/L (ref 96–107)
CO2: 25 MMOL/L (ref 18–32)
CREAT SERPL-MCNC: 0.91 MG/DL (ref 0.67–1.3)
EGFR IF NONAFRICAN AMERICAN: 95 ML/MIN/1.73M2
EOSINOPHILS ABSOLUTE: 0.05 K/UL (ref 0–0.8)
EOSINOPHILS RELATIVE PERCENT: 1.2 % (ref 0–5)
GLUCOSE: 115 MG/DL (ref 70–100)
HCT VFR BLD CALC: 42.6 % (ref 39–52)
HEMOGLOBIN: 14.7 G/DL (ref 13–18)
IMMATURE GRANS (ABS): 0.01 K/UL (ref 0–0.06)
IMMATURE GRANULOCYTES %: 0.2 % (ref 0–2)
LYMPHOCYTES ABSOLUTE: 1.44 K/UL (ref 0.9–5.2)
LYMPHOCYTES RELATIVE PERCENT: 34 % (ref 20–45)
MCH RBC QN AUTO: 34.3 PG (ref 26–32)
MCHC RBC AUTO-ENTMCNC: 34.5 G/DL (ref 32–35)
MCV RBC AUTO: 100 FL (ref 75–100)
MONOCYTES ABSOLUTE: 0.52 K/UL (ref 0.1–1)
MONOCYTES RELATIVE PERCENT: 12.3 % (ref 0–13)
NEUTROPHILS ABSOLUTE: 2.19 K/UL (ref 1.9–8)
NEUTROPHILS RELATIVE PERCENT: 51.8 % (ref 45–75)
PDW BLD-RTO: 11.5 % (ref 11.2–14.8)
PLATELET # BLD: 197 THOUS/CMM (ref 140–440)
POTASSIUM SERPL-SCNC: 5.1 MMOL/L (ref 3.5–5.4)
RBC # BLD: 4.28 MILL/CMM (ref 4.4–6.1)
SODIUM BLD-SCNC: 144 MMOL/L (ref 135–148)
WBC # BLD: 4.2 THDS/CMM (ref 3.6–11)

## 2025-06-12 ENCOUNTER — RESULTS FOLLOW-UP (OUTPATIENT)
Dept: UROLOGY | Age: 65
End: 2025-06-12

## 2025-06-12 LAB — URINE CULTURE, ROUTINE: NORMAL

## 2025-06-17 RX ORDER — MOXIFLOXACIN 5 MG/ML
1 SOLUTION/ DROPS OPHTHALMIC 3 TIMES DAILY
COMMUNITY
Start: 2025-06-11

## 2025-06-17 RX ORDER — PREDNISOLONE ACETATE 10 MG/ML
1 SUSPENSION/ DROPS OPHTHALMIC 3 TIMES DAILY
COMMUNITY
Start: 2025-06-12

## 2025-06-17 NOTE — FLOWSHEET NOTE
Follow all instructions given by your physician  If Urology case Call [phone] the weekday before procedure to find out Arrival Time.  Do not eat or drink anything after midnight prior to surgery(includes water, chewing gum, mints and ice chips)  Sips of water am of surgery with allowed medications  May brush teeth   Do not smoke or chew tobacco, drink alcoholic beverages or use any illicit drugs for 24 hours prior to surgery  Bring insurance info and photo ID  Bring pertinent paperwork with you from Doctor or surgeons's office  Wear clean comfortable, loose-fitting clothing  No make-up, nail polish, jewelry, piercings, or contact lenses to be worn day of surgery  No glue on dentures morning of surgery; you will be asked to remove them for surgery. Case for glasses.  Shower the night before and the morning of surgery with cleansing soap provided or a liquid antibacterial soap, dry with new fresh clean towel after each shower, no lotions, creams or powder.  Clean sheets and pillowcase on bed night before surgery  Bring rescue inhalers with you  Bring CPAP/BIPAP machine if you have one ( you may be charged if one is needed in recovery room ) yes has one not brining no pacemaker       If patient is a Hip or Knee Replacement, HOOS or KOOS is reviewed and documented.     Do you have a DNR? no  Please Bring Healthcare Directive or Healthcare Power of  in so we can scan it into your chart.    Report to [facility] on 2nd floor  Make sure you have a responsible adult to drive you home after your surgery otherwise surgery will be cancelled.   You must have a responsible adult to stay with you overnight after your procedure.    If you would become ill prior to surgery, please call the surgeon right away.  We request that you limit to 2 visitors in pre-op area    Call [phone] for any questions    Our pharmacy has a Meds to Beds program where they will deliver any new prescriptions you may have to your room before  you leave. Our Pharmacy will clear it through your insurance; for example (same co pay). This enables you to take your new RX as soon as you need when you get home and avoids stop/wait delays on the way home.  Please have a form of payment with you and have someone designated as your Pharmacy contact with their phone # as you may not feel well or still be under the influence of anesthesia.    Please refer to the SSI-Surgical Site Infection Flyer you hopefully received in the mail-together we can prevent infections; signs and symptoms reviewed.  When discharged be sure you understand how to care for your wound and that you have the Dr./office phone # to call if you have any concerns or questions about your wound.

## 2025-06-17 NOTE — PROGRESS NOTES
Pt has dog in house.  He doesn't want anyone call with update on surgery.  His brother is to pick him and take him home and stay with him after surgery.

## 2025-06-23 ENCOUNTER — PREP FOR PROCEDURE (OUTPATIENT)
Dept: UROLOGY | Age: 65
End: 2025-06-23

## 2025-06-24 ENCOUNTER — ANESTHESIA EVENT (OUTPATIENT)
Dept: OPERATING ROOM | Age: 65
End: 2025-06-24
Payer: COMMERCIAL

## 2025-06-24 ENCOUNTER — HOSPITAL ENCOUNTER (OUTPATIENT)
Age: 65
Setting detail: OUTPATIENT SURGERY
Discharge: HOME | End: 2025-06-24
Attending: UROLOGY | Admitting: UROLOGY
Payer: COMMERCIAL

## 2025-06-24 ENCOUNTER — ANESTHESIA (OUTPATIENT)
Dept: OPERATING ROOM | Age: 65
End: 2025-06-24
Payer: COMMERCIAL

## 2025-06-24 VITALS
BODY MASS INDEX: 32.21 KG/M2 | HEART RATE: 46 BPM | DIASTOLIC BLOOD PRESSURE: 70 MMHG | HEIGHT: 70 IN | TEMPERATURE: 97.2 F | RESPIRATION RATE: 16 BRPM | SYSTOLIC BLOOD PRESSURE: 129 MMHG | WEIGHT: 225 LBS | OXYGEN SATURATION: 96 %

## 2025-06-24 DIAGNOSIS — C67.2 MALIGNANT NEOPLASM OF LATERAL WALL OF URINARY BLADDER (HCC): Primary | ICD-10-CM

## 2025-06-24 DIAGNOSIS — C61 PROSTATE CANCER (HCC): ICD-10-CM

## 2025-06-24 DIAGNOSIS — C67.9 MALIGNANT NEOPLASM OF URINARY BLADDER (HCC): ICD-10-CM

## 2025-06-24 PROCEDURE — 7100000010 HC PHASE II RECOVERY - FIRST 15 MIN: Performed by: UROLOGY

## 2025-06-24 PROCEDURE — 3700000000 HC ANESTHESIA ATTENDED CARE: Performed by: UROLOGY

## 2025-06-24 PROCEDURE — 6360000004 HC RX CONTRAST MEDICATION: Performed by: UROLOGY

## 2025-06-24 PROCEDURE — 3600000013 HC SURGERY LEVEL 3 ADDTL 15MIN: Performed by: UROLOGY

## 2025-06-24 PROCEDURE — 88305 TISSUE EXAM BY PATHOLOGIST: CPT

## 2025-06-24 PROCEDURE — C1758 CATHETER, URETERAL: HCPCS | Performed by: UROLOGY

## 2025-06-24 PROCEDURE — 7100000011 HC PHASE II RECOVERY - ADDTL 15 MIN: Performed by: UROLOGY

## 2025-06-24 PROCEDURE — 2580000003 HC RX 258: Performed by: UROLOGY

## 2025-06-24 PROCEDURE — 6360000002 HC RX W HCPCS: Performed by: NURSE ANESTHETIST, CERTIFIED REGISTERED

## 2025-06-24 PROCEDURE — 2709999900 HC NON-CHARGEABLE SUPPLY: Performed by: UROLOGY

## 2025-06-24 PROCEDURE — 3700000001 HC ADD 15 MINUTES (ANESTHESIA): Performed by: UROLOGY

## 2025-06-24 PROCEDURE — 3600000003 HC SURGERY LEVEL 3 BASE: Performed by: UROLOGY

## 2025-06-24 RX ORDER — HYDROCODONE BITARTRATE AND ACETAMINOPHEN 5; 325 MG/1; MG/1
1 TABLET ORAL EVERY 4 HOURS PRN
Qty: 18 TABLET | Refills: 0 | Status: SHIPPED | OUTPATIENT
Start: 2025-06-24 | End: 2025-06-27

## 2025-06-24 RX ORDER — SODIUM CHLORIDE 0.9 % (FLUSH) 0.9 %
5-40 SYRINGE (ML) INJECTION PRN
Status: CANCELLED | OUTPATIENT
Start: 2025-06-24

## 2025-06-24 RX ORDER — SODIUM CHLORIDE 0.9 % (FLUSH) 0.9 %
5-40 SYRINGE (ML) INJECTION EVERY 12 HOURS SCHEDULED
Status: CANCELLED | OUTPATIENT
Start: 2025-06-24

## 2025-06-24 RX ORDER — ONDANSETRON 2 MG/ML
INJECTION INTRAMUSCULAR; INTRAVENOUS
Status: DISCONTINUED | OUTPATIENT
Start: 2025-06-24 | End: 2025-06-24 | Stop reason: SDUPTHER

## 2025-06-24 RX ORDER — FENTANYL CITRATE 50 UG/ML
50 INJECTION, SOLUTION INTRAMUSCULAR; INTRAVENOUS EVERY 5 MIN PRN
Refills: 0 | Status: CANCELLED | OUTPATIENT
Start: 2025-06-24

## 2025-06-24 RX ORDER — PHENAZOPYRIDINE HYDROCHLORIDE 200 MG/1
200 TABLET, FILM COATED ORAL 3 TIMES DAILY PRN
Qty: 9 TABLET | Refills: 0 | Status: SHIPPED | OUTPATIENT
Start: 2025-06-24

## 2025-06-24 RX ORDER — SODIUM CHLORIDE 9 MG/ML
INJECTION, SOLUTION INTRAVENOUS CONTINUOUS
Status: DISCONTINUED | OUTPATIENT
Start: 2025-06-24 | End: 2025-06-24 | Stop reason: HOSPADM

## 2025-06-24 RX ORDER — FENTANYL CITRATE 50 UG/ML
25 INJECTION, SOLUTION INTRAMUSCULAR; INTRAVENOUS EVERY 5 MIN PRN
Refills: 0 | Status: CANCELLED | OUTPATIENT
Start: 2025-06-24

## 2025-06-24 RX ORDER — PROPOFOL 10 MG/ML
INJECTION, EMULSION INTRAVENOUS
Status: DISCONTINUED | OUTPATIENT
Start: 2025-06-24 | End: 2025-06-24 | Stop reason: SDUPTHER

## 2025-06-24 RX ORDER — SODIUM CHLORIDE 9 MG/ML
INJECTION, SOLUTION INTRAVENOUS PRN
Status: CANCELLED | OUTPATIENT
Start: 2025-06-24

## 2025-06-24 RX ORDER — FENTANYL CITRATE 50 UG/ML
INJECTION, SOLUTION INTRAMUSCULAR; INTRAVENOUS
Status: DISCONTINUED | OUTPATIENT
Start: 2025-06-24 | End: 2025-06-24 | Stop reason: SDUPTHER

## 2025-06-24 RX ORDER — LIDOCAINE HCL/PF 100 MG/5ML
SYRINGE (ML) INJECTION
Status: DISCONTINUED | OUTPATIENT
Start: 2025-06-24 | End: 2025-06-24 | Stop reason: SDUPTHER

## 2025-06-24 RX ORDER — CIPROFLOXACIN 500 MG/1
500 TABLET, FILM COATED ORAL 2 TIMES DAILY
Qty: 6 TABLET | Refills: 0 | Status: SHIPPED | OUTPATIENT
Start: 2025-06-24 | End: 2025-06-27

## 2025-06-24 RX ORDER — CEFAZOLIN SODIUM 1 G/3ML
INJECTION, POWDER, FOR SOLUTION INTRAMUSCULAR; INTRAVENOUS
Status: DISCONTINUED | OUTPATIENT
Start: 2025-06-24 | End: 2025-06-24 | Stop reason: SDUPTHER

## 2025-06-24 RX ORDER — MIDAZOLAM HYDROCHLORIDE 1 MG/ML
INJECTION, SOLUTION INTRAMUSCULAR; INTRAVENOUS
Status: DISCONTINUED | OUTPATIENT
Start: 2025-06-24 | End: 2025-06-24 | Stop reason: SDUPTHER

## 2025-06-24 RX ORDER — NALOXONE HYDROCHLORIDE 0.4 MG/ML
INJECTION, SOLUTION INTRAMUSCULAR; INTRAVENOUS; SUBCUTANEOUS PRN
Status: CANCELLED | OUTPATIENT
Start: 2025-06-24

## 2025-06-24 RX ORDER — DEXAMETHASONE SODIUM PHOSPHATE 4 MG/ML
INJECTION, SOLUTION INTRA-ARTICULAR; INTRALESIONAL; INTRAMUSCULAR; INTRAVENOUS; SOFT TISSUE
Status: DISCONTINUED | OUTPATIENT
Start: 2025-06-24 | End: 2025-06-24 | Stop reason: SDUPTHER

## 2025-06-24 RX ADMIN — PROPOFOL 25 MG: 10 INJECTION, EMULSION INTRAVENOUS at 08:17

## 2025-06-24 RX ADMIN — PROPOFOL 50 MG: 10 INJECTION, EMULSION INTRAVENOUS at 08:10

## 2025-06-24 RX ADMIN — DEXAMETHASONE SODIUM PHOSPHATE 8 MG: 4 INJECTION, SOLUTION INTRAMUSCULAR; INTRAVENOUS at 08:01

## 2025-06-24 RX ADMIN — Medication 100 MG: at 08:00

## 2025-06-24 RX ADMIN — FENTANYL CITRATE 50 MCG: 50 INJECTION, SOLUTION INTRAMUSCULAR; INTRAVENOUS at 08:00

## 2025-06-24 RX ADMIN — ONDANSETRON 4 MG: 2 INJECTION, SOLUTION INTRAMUSCULAR; INTRAVENOUS at 08:16

## 2025-06-24 RX ADMIN — CEFAZOLIN 2 G: 1 INJECTION, POWDER, FOR SOLUTION INTRAMUSCULAR; INTRAVENOUS at 08:09

## 2025-06-24 RX ADMIN — PROPOFOL 50 MG: 10 INJECTION, EMULSION INTRAVENOUS at 08:00

## 2025-06-24 RX ADMIN — MIDAZOLAM 1 MG: 1 INJECTION INTRAMUSCULAR; INTRAVENOUS at 08:00

## 2025-06-24 RX ADMIN — SODIUM CHLORIDE: 0.9 INJECTION, SOLUTION INTRAVENOUS at 07:11

## 2025-06-24 RX ADMIN — PROPOFOL 50 MCG/KG/MIN: 10 INJECTION, EMULSION INTRAVENOUS at 08:02

## 2025-06-24 ASSESSMENT — PAIN SCALES - GENERAL
PAINLEVEL_OUTOF10: 0
PAINLEVEL_OUTOF10: 0

## 2025-06-24 ASSESSMENT — PAIN - FUNCTIONAL ASSESSMENT: PAIN_FUNCTIONAL_ASSESSMENT: 0-10

## 2025-06-24 NOTE — PROGRESS NOTES
Patient oriented to Same Day department and admitted to Same Day Surgery room 6.   Patient verbalized approval for first name, last initial with physician name on unit whiteboard.     Plan of care reviewed with patient.   Patient room whiteboard filled out and discussed with patient and responsible adult.   Patient and responsible adult offered Same Day Welcome Packet to review.    Call light in reach.   Bed in lowest position, locked, with one bed rail up.   SCDs and warming blanket in place.  Appropriate arm bands on patient.   Bathroom offered.   All questions and concerns of patient addressed.        Meds to Beds:   Patient informed of [facility] Meds to Beds program during admission. Patient is agreeable to program.   Contact information for the pharmacy and the Meds to Beds program:   Name: Byron   Relationship to patient:patient   Phone number: same day

## 2025-06-24 NOTE — PROGRESS NOTES
Pt returned to SDS room 6. Vitals and assessment as charted. 0.9 infusing, @400ml to count from PACU. Pt has sabrina and ginger ale. Family at the bedside. Pt and family verbalized understanding of discharge criteria and call light use. Call light in reach.

## 2025-06-24 NOTE — ANESTHESIA POSTPROCEDURE EVALUATION
Department of Anesthesiology  Postprocedure Note    Patient: Dmitri Caicedo  MRN: [id number]  YOB: 1960  Date of evaluation: 6/24/2025    Procedure Summary       Date: 06/24/25 [facility]    Anesthesia Start: 0755 Anesthesia Stop: 0834    Procedure: CYSTOSCOPY WITH BILATERAL RETROGRADE PYELOGRAM-BLADDER BIOPSY AND FULGURATION OF BLEEDERS (Bilateral) Diagnosis:       Malignant neoplasm of urinary bladder (HCC)      Prostate cancer (HCC)      (Malignant neoplasm of urinary bladder (HCC) [C67.9])      (Prostate cancer (HCC) [C61])    Surgeons: Lew Bashir MD Responsible Provider: Hu Lucio DO    Anesthesia Type: general ASA Status: 2            Anesthesia Type: No value filed.    Marlen Phase I: Marlen Score: 10    Marlen Phase II:      Anesthesia Post Evaluation    Patient location during evaluation: bedside  Patient participation: complete - patient participated  Level of consciousness: awake  Pain score: 0  Airway patency: patent  Nausea & Vomiting: no nausea and no vomiting  Cardiovascular status: blood pressure returned to baseline  Respiratory status: acceptable  Hydration status: stable  Pain management: satisfactory to patient        No notable events documented.

## 2025-06-24 NOTE — PROGRESS NOTES
Pt has met discharge criteria and states he is ready for discharge to home. IV removed, gauze and tape applied. Dressed in own clothes and personal belongings gathered. Discharge instructions (with opioid medication education information) given to pt and family; pt and family verbalized understanding of discharge instructions, prescriptions and follow up appointments. Pt transported to discharge lobby by [facility] staff.

## 2025-06-24 NOTE — ANESTHESIA PRE PROCEDURE
\"BEART\", \"[id number]\"     Type & Screen (If Applicable):  No results found for: \"ABORH\", \"LABANTI\"    Drug/Infectious Status (If Applicable):  No results found for: \"HIV\", \"HEPCAB\"    COVID-19 Screening (If Applicable): No results found for: \"COVID19\"        Anesthesia Evaluation  Patient summary reviewed  Airway: Mallampati: III  TM distance: >3 FB   Neck ROM: full  Mouth opening: > = 3 FB   Dental:          Pulmonary:   (+)     sleep apnea:                                  Cardiovascular:    (+) hypertension:      ECG reviewed                        Neuro/Psych:   (+) neuromuscular disease:, psychiatric history:            GI/Hepatic/Renal:             Endo/Other:                     Abdominal:             Vascular:          Other Findings:       Anesthesia Plan      general     ASA 2       Induction: intravenous.    MIPS: Postoperative opioids intended and Prophylactic antiemetics administered.  Anesthetic plan and risks discussed with patient.      Plan discussed with [facility]AL.                Hu Lucio DO   6/24/2025

## 2025-06-24 NOTE — H&P
History and Physical    Patient:  Dmitri Caicedo  MRN: [id number]  YOB: 1960    CHIEF COMPLAINT:  Malignant neoplasm of urinary bladder (HCC) [C67.9]  Prostate cancer (HCC) [C61]      HISTORY OF PRESENT ILLNESS:   The patient is a 65 y.o. male who presents with as above, here for surgery    Patient's old records, notes and chart reviewed and summarized above.     Past Medical History:    Past Medical History:   Diagnosis Date    Arthritis     Bursitis     Cancer (HCC) 11/2019    bladder cancer    Chest pain     Elevated PSA     Erectile dysfunction     Hyperlipidemia     Hypertension     Malignant neoplasm of lateral wall of urinary bladder (HCC)     Prostate cancer (HCC)     Sleep apnea     cpap using       Past Surgical History:    Past Surgical History:   Procedure Laterality Date    CARDIAC CATHETERIZATION  6/30/2011    CYSTOSCOPY N/A 12/5/2019    CYSTOSCOPY TRANSURETHRAL RESECTION BLADDER TUMOR performed by Lew Bashir MD at [facility] OR    CYSTOSCOPY W BIOPSY OF BLADDER N/A 11/7/2019    CYSTOSCOPY,  BLADDER BIOPSY,  TUR BLADDER TUMOR performed by Lew Bashir MD at [facility] OR    WISDOM TOOTH EXTRACTION       Medications Prior to Admission:    Prior to Admission medications    Medication Sig Start Date End Date Taking? Authorizing Provider   verapamil (CALAN SR) 180 MG extended release tablet TAKE 1 TABLET NIGHTLY 7/31/24  Yes Gómez Santillan, DO   lisinopril (PRINIVIL;ZESTRIL) 20 MG tablet Take 1 tablet by mouth daily 7/31/24  Yes Gómez Santillan DO   tadalafil (CIALIS) 20 MG tablet Take 1 tablet by mouth daily as needed for Erectile Dysfunction 7/31/24  Yes Gómez Santillan DO   clonazePAM (KLONOPIN) 0.5 MG tablet TAKE 1 TABLET TWICE A DAY  AS NEEDED FOR ANXIETY FOR  UP  DAYS 10/3/23 6/24/25 Yes Gómez Santillan DO   ibuprofen (IBU) 800 MG tablet TAKE 1 TABLET EVERY 6 HOURS AS NEEDED 4/25/23  Yes Gómez Santillan, DO   Multiple Vitamins-Minerals

## 2025-06-24 NOTE — DISCHARGE INSTRUCTIONS
Call your doctor for the following:   Chills   Temperature greater than 101   Pain that is not tolerable despite taking pain medicine as ordered   Inability to urinate >12 hours/ or a non-draining gilmore         No alcoholic beverages, no driving or operating machinery, no making important decisions for 24 hours.     Take your prescribed medications (if any) as instructed.    You may have a normal diet but should eat lightly on the day of surgery.    Drink plenty of fluids.    You may notice some burning or blood with urination, this is normal and should improve over next few days.    You may also take motrin/ibuprofen for pain control, you can alternate this with your other pain medications.    Be sure to take over the counter stool softeners if you notice constipation or hard stools.    Follow up with Dr. Bashir, office will arrange.     Resume any blood thinners in 3 days

## 2025-07-03 ENCOUNTER — OFFICE VISIT (OUTPATIENT)
Dept: UROLOGY | Age: 65
End: 2025-07-03
Payer: COMMERCIAL

## 2025-07-03 VITALS
SYSTOLIC BLOOD PRESSURE: 134 MMHG | OXYGEN SATURATION: 98 % | BODY MASS INDEX: 33.57 KG/M2 | DIASTOLIC BLOOD PRESSURE: 62 MMHG | HEART RATE: 54 BPM | HEIGHT: 70 IN | WEIGHT: 234.5 LBS

## 2025-07-03 DIAGNOSIS — C67.9 MALIGNANT NEOPLASM OF URINARY BLADDER, UNSPECIFIED SITE (HCC): Primary | ICD-10-CM

## 2025-07-03 DIAGNOSIS — Z85.51 HISTORY OF BLADDER CANCER: ICD-10-CM

## 2025-07-03 DIAGNOSIS — R97.20 ELEVATED PSA: ICD-10-CM

## 2025-07-03 DIAGNOSIS — C67.2 MALIGNANT NEOPLASM OF LATERAL WALL OF URINARY BLADDER (HCC): ICD-10-CM

## 2025-07-03 DIAGNOSIS — C61 PROSTATE CANCER (HCC): ICD-10-CM

## 2025-07-03 DIAGNOSIS — C68.9 UROTHELIAL CARCINOMA (HCC): ICD-10-CM

## 2025-07-03 PROCEDURE — G8417 CALC BMI ABV UP PARAM F/U: HCPCS | Performed by: UROLOGY

## 2025-07-03 PROCEDURE — 1036F TOBACCO NON-USER: CPT | Performed by: UROLOGY

## 2025-07-03 PROCEDURE — 3017F COLORECTAL CA SCREEN DOC REV: CPT | Performed by: UROLOGY

## 2025-07-03 PROCEDURE — G8427 DOCREV CUR MEDS BY ELIG CLIN: HCPCS | Performed by: UROLOGY

## 2025-07-03 PROCEDURE — 3075F SYST BP GE 130 - 139MM HG: CPT | Performed by: UROLOGY

## 2025-07-03 PROCEDURE — 3078F DIAST BP <80 MM HG: CPT | Performed by: UROLOGY

## 2025-07-03 PROCEDURE — 99214 OFFICE O/P EST MOD 30 MIN: CPT | Performed by: UROLOGY

## 2025-07-03 ASSESSMENT — ENCOUNTER SYMPTOMS
ABDOMINAL PAIN: 0
NAUSEA: 0
VOMITING: 0
BACK PAIN: 0

## 2025-07-03 NOTE — PROGRESS NOTES
CLINICAL INFORMATION: Malignant neoplasm of urinary organ.     COMPARISON: CT abdomen and pelvis 9/27/2019.     TECHNIQUE:   5 mm axial imaging through the abdomen and pelvis without contrast, 5 mm axial  imaging through the abdomen with intravenous contrast (2 minute delay) and 5 mm  axial imaging through the abdomen and pelvis with intravenous contrast (8 minute  delay). Coronal and sagittal reconstructions of the abdomen and pelvis (8 minute  delay) were also performed.     All CT scans at this facility use dose modulation, iterative reconstruction,  and/or weight based dosing when appropriate to reduce the radiation dose to as  low as reasonably achievable.     CONTRAST: 85 cc Isovue-370.        FINDINGS:      Lung bases: Dependent atelectasis is present.     Liver/gallbladder/bilary tree: No radiopaque gallstones or biliary ductal  dilatation is identified. Hepatomegaly and hepatic steatosis are unchanged.     Pancreas: Normal.  Spleen : Normal.  Adrenal glands: Normal.     Kidneys/ ureters/ bladder: No renal calculus, hydronephrosis, or hydroureter is  visualized. The urinary bladder wall remains thickened. The ureters are  symmetric and unremarkable.     Gastrointestinal: No bowel obstruction, fluid collection, or free air is  visualized. Small fat-containing bilateral inguinal hernias are stable. A small  fat-containing umbilical hernia is unchanged.     Retroperitoneum / lymph nodes: The aorta is not dilated. No lymphadenopathy is  visualized.     Pelvis: Mild prostatomegaly is visualized. The prostate gland is heterogeneous  in attenuation possibly related to biopsy.     Musculoskeletal: Multilevel degenerative disc disease is observed. The  visualized skeletal structures appear intact.        IMPRESSION:  1. No renal calculus or obstructive uropathy is visualized. Mild bladder wall  thickening is stable and remains nonspecific. Correlate with urinalysis and  cystoscopy.     2. Mild prostatomegaly is

## 2025-07-11 NOTE — OP NOTE
Operative Note      Patient: Dmitri Caicedo  YOB: 1960  MRN: [id number]    Date of Procedure: 6/24/2025    Pre-Op Diagnosis Codes:      * Malignant neoplasm of urinary bladder (HCC) [C67.9]     * Prostate cancer (HCC) [C61]    Post-Op Diagnosis: Same       Procedure(s):  CYSTOSCOPY WITH BILATERAL RETROGRADE PYELOGRAM-BLADDER BIOPSY AND FULGURATION OF BLEEDERS    Surgeon(s):  Lew Bashir MD    Assistant:   * No surgical staff found *    Anesthesia: Monitor Anesthesia Care    Estimated Blood Loss (mL): Minimal    Complications: None    Specimens:   ID Type Source Tests Collected by Time Destination   A : bladder biopsy Tissue Bladder SURGICAL PATHOLOGY Lew Bashir MD 6/24/2025 0812        Implants:  * No implants in log *      Drains: * No LDAs found *    Findings:  Present At Time Of Surgery (PATOS) (choose all levels that have infection present):  No infection present  Other Findings: posterior wall flat lesions, subtle in appearence    Detailed Description of Procedure:         DETAILS OF THE PROCEDURE:  The patient was correctly identified in the preoperative holding area. The patient was brought back to the operating room and placed in the dorsal lithotomy position.  Anesthesia was administered; antibiotics administered by Anesthesia.  EPC cuffs  were on and functional. Patient was then prepped and draped in the usual sterile fashion.  Once an appropriate time out had been performed, with all parties consenting, a 22 [place] cystoscope with a 30-degree lens was placed through the urethra into the bladder. A thorough and complete cystoscopy was performed.  We identified a lesion in the bladder and we elected to biopsy.  The lesion was biopsied with a cold cup biopsy forcep, and then the area was fulgurated with a bugbee electrode.  Hemostasis was achieved. The area was extensively cauterized. The right ureteral orifice was cannulated with a 5 [place] ureteral catheter. Contrast was

## 2025-07-23 ENCOUNTER — CLINICAL SUPPORT (OUTPATIENT)
Dept: UROLOGY | Age: 65
End: 2025-07-23
Payer: COMMERCIAL

## 2025-07-23 VITALS — BODY MASS INDEX: 33.65 KG/M2 | HEIGHT: 70 IN

## 2025-07-23 DIAGNOSIS — C67.9 MALIGNANT NEOPLASM OF URINARY BLADDER, UNSPECIFIED SITE (HCC): Primary | ICD-10-CM

## 2025-07-23 LAB
BILIRUBIN, URINE: ABNORMAL
BLOOD URINE, POC: ABNORMAL
CHARACTER, URINE: CLEAR
COLOR, UA: YELLOW
GLUCOSE URINE: NEGATIVE MG/DL
KETONES, URINE: 40
LEUKOCYTE CLUMPS, URINE: ABNORMAL
NITRITE, URINE: NEGATIVE
PH, URINE: 6 (ref 5–9)
PROTEIN, URINE: 30 MG/DL
SPECIFIC GRAVITY UA: 1.01 (ref 1–1.03)
UROBILINOGEN, URINE: 0.2 EU/DL (ref 0–1)

## 2025-07-23 PROCEDURE — 51720 TREATMENT OF BLADDER LESION: CPT

## 2025-07-23 PROCEDURE — 81003 URINALYSIS AUTO W/O SCOPE: CPT

## 2025-07-23 NOTE — PROGRESS NOTES
UA reviewed, proceed with treatment.    Results for orders placed or performed in visit on 07/23/25   POCT Urinalysis No Micro (Auto)   Result Value Ref Range    Glucose, Ur Negative NEGATIVE mg/dl    Bilirubin, Urine Small (A)     Ketones, Urine 40 (A) NEGATIVE    Specific Gravity, UA 1.015 1.002 - 1.030    Blood, UA POC Trace-intact NEGATIVE    pH, Urine 6.00 5.0 - 9.0    Protein, Urine 30 (A) NEGATIVE mg/dl    Urobilinogen, Urine 0.20 0.0 - 1.0 eu/dl    Nitrite, Urine Negative NEGATIVE    Leukocyte Clumps, Urine Trace (A) NEGATIVE    Color, UA Yellow YELLOW-STRAW    Character, Urine Clear CLR-SL.[email or web address]     Patient has given me verbal consent to perform BCG  Yes    DIAGNOSIS/INDICATION:  urothelial carcinoma    Is patient currently taking any antibiotics for any medical conditions? no   If patient is taking antibiotics, did you get verbal okay from provider to give BCG? N/a    Does patient have latex allergy?  No  Does patient have shellfish or betadine allergy?  No      Following Dr Bashir plan of care. After insuring patient does not have any symptoms of a Urinary Tract Infection patient is wiped with betadine solution to cleanse urethra opening. 16 Fr straight catheter is inserted without difficulty, 50ml residual obtained, and bladder is completely emptied. BCG is then mixed with sodium chloride solution using closed transfer system. Then the catheter is attached to bulb syringe and BCG is then poured into the bulb syringe, bulb syringe is capped, and BCG is slowly instilled into bladder. Patient did not have a bladder spasms during instillation . Once all the solution was through the catheter tubing was removed from urethra and discarded into yellow biohazard bag.     Pt was administered 50 mg of BCG today.      BCG 1 OF 6 INSTILLED WITHOUT DIFFICULTY. INSTRUCTIONS REVIEWED WITH PATIENT.  Bladder tumor resection site: posterior wall  Lot Number: [id number]  Expiration Date: 1/23/2026  NDC #:

## 2025-07-30 ENCOUNTER — CLINICAL SUPPORT (OUTPATIENT)
Dept: UROLOGY | Age: 65
End: 2025-07-30

## 2025-07-30 DIAGNOSIS — C67.2 MALIGNANT NEOPLASM OF LATERAL WALL OF URINARY BLADDER (HCC): Primary | ICD-10-CM

## 2025-07-30 LAB
BILIRUBIN, URINE: ABNORMAL
BLOOD URINE, POC: ABNORMAL
CHARACTER, URINE: CLEAR
COLOR, UA: YELLOW
GLUCOSE URINE: NEGATIVE MG/DL
KETONES, URINE: 15
LEUKOCYTE CLUMPS, URINE: NEGATIVE
NITRITE, URINE: NEGATIVE
PH, URINE: 7 (ref 5–9)
PROTEIN, URINE: 30 MG/DL
SPECIFIC GRAVITY UA: 1.01 (ref 1–1.03)
UROBILINOGEN, URINE: 1 EU/DL (ref 0–1)

## 2025-07-30 PROCEDURE — 51720 TREATMENT OF BLADDER LESION: CPT

## 2025-07-30 PROCEDURE — 81003 URINALYSIS AUTO W/O SCOPE: CPT

## 2025-07-30 NOTE — PROGRESS NOTES
UA reviewed, proceed with treatment.     Results for orders placed or performed in visit on 07/30/25   POCT Urinalysis No Micro (Auto)   Result Value Ref Range    Glucose, Ur Negative NEGATIVE mg/dl    Bilirubin, Urine Small (A)     Ketones, Urine 15 (A) NEGATIVE    Specific Gravity, UA 1.015 1.002 - 1.030    Blood, UA POC Trace-intact NEGATIVE    pH, Urine 7.00 5.0 - 9.0    Protein, Urine 30 (A) NEGATIVE mg/dl    Urobilinogen, Urine 1.00 0.0 - 1.0 eu/dl    Nitrite, Urine Negative NEGATIVE    Leukocyte Clumps, Urine Negative NEGATIVE    Color, UA Yellow YELLOW-STRAW    Character, Urine Clear [id number].[email or web address]       Patient has given me verbal consent to perform BCG  Yes     DIAGNOSIS/INDICATION:  urothelial carcinoma     Is patient currently taking any antibiotics for any medical conditions? no   If patient is taking antibiotics, did you get verbal okay from provider to give BCG? N/a     Does patient have latex allergy?  No  Does patient have shellfish or betadine allergy?  No        Following Dr Bashir plan of care. After insuring patient does not have any symptoms of a Urinary Tract Infection patient is wiped with betadine solution to cleanse urethra opening. 16 Fr straight catheter is inserted without difficulty, 15ml residual obtained, and bladder is completely emptied. BCG is then mixed with sodium chloride solution using closed transfer system. Then the catheter is attached to bulb syringe and BCG is then poured into the bulb syringe, bulb syringe is capped, and BCG is slowly instilled into bladder. Patient did not have a bladder spasms during instillation . Once all the solution was through the catheter tubing was removed from urethra and discarded into yellow biohazard bag.      Pt was administered 50 mg of BCG today.        BCG 2 OF 6 INSTILLED WITHOUT DIFFICULTY. INSTRUCTIONS REVIEWED WITH PATIENT.  Bladder tumor resection site: posterior wall  Lot Number: [id number]  Expiration Date: 1/10/26  NDC #:

## 2025-07-31 DIAGNOSIS — I10 ESSENTIAL HYPERTENSION: ICD-10-CM

## 2025-07-31 RX ORDER — LISINOPRIL 20 MG/1
20 TABLET ORAL DAILY
Qty: 90 TABLET | Refills: 3 | Status: SHIPPED | OUTPATIENT
Start: 2025-07-31

## 2025-07-31 RX ORDER — VERAPAMIL HYDROCHLORIDE 180 MG/1
180 TABLET, FILM COATED, EXTENDED RELEASE ORAL NIGHTLY
Qty: 90 TABLET | Refills: 3 | Status: SHIPPED | OUTPATIENT
Start: 2025-07-31

## 2025-08-06 ENCOUNTER — CLINICAL SUPPORT (OUTPATIENT)
Dept: UROLOGY | Age: 65
End: 2025-08-06

## 2025-08-06 DIAGNOSIS — C67.9 MALIGNANT NEOPLASM OF URINARY BLADDER, UNSPECIFIED SITE (HCC): Primary | ICD-10-CM

## 2025-08-06 LAB
BILIRUBIN, URINE: NEGATIVE
BLOOD URINE, POC: ABNORMAL
CHARACTER, URINE: CLEAR
COLOR, UA: YELLOW
GLUCOSE URINE: NEGATIVE MG/DL
KETONES, URINE: NEGATIVE
LEUKOCYTE CLUMPS, URINE: ABNORMAL
NITRITE, URINE: NEGATIVE
PH, URINE: 7 (ref 5–9)
PROTEIN, URINE: NEGATIVE MG/DL
SPECIFIC GRAVITY UA: 1.01 (ref 1–1.03)
UROBILINOGEN, URINE: 0.2 EU/DL (ref 0–1)

## 2025-08-06 PROCEDURE — 51720 TREATMENT OF BLADDER LESION: CPT

## 2025-08-06 PROCEDURE — 81003 URINALYSIS AUTO W/O SCOPE: CPT

## 2025-08-13 ENCOUNTER — CLINICAL SUPPORT (OUTPATIENT)
Dept: UROLOGY | Age: 65
End: 2025-08-13
Payer: COMMERCIAL

## 2025-08-13 DIAGNOSIS — C67.9 MALIGNANT NEOPLASM OF URINARY BLADDER, UNSPECIFIED SITE (HCC): Primary | ICD-10-CM

## 2025-08-13 LAB
BILIRUBIN, URINE: NEGATIVE
BLOOD URINE, POC: NEGATIVE
CHARACTER, URINE: CLEAR
COLOR, UA: YELLOW
GLUCOSE URINE: NEGATIVE MG/DL
KETONES, URINE: NEGATIVE
LEUKOCYTE CLUMPS, URINE: NEGATIVE
NITRITE, URINE: NEGATIVE
PH, URINE: 7 (ref 5–9)
PROTEIN, URINE: NEGATIVE MG/DL
SPECIFIC GRAVITY UA: 1.01 (ref 1–1.03)
UROBILINOGEN, URINE: 0.2 EU/DL (ref 0–1)

## 2025-08-13 PROCEDURE — 51720 TREATMENT OF BLADDER LESION: CPT | Performed by: NURSE PRACTITIONER

## 2025-08-13 PROCEDURE — 81003 URINALYSIS AUTO W/O SCOPE: CPT | Performed by: NURSE PRACTITIONER

## 2025-08-20 ENCOUNTER — CLINICAL SUPPORT (OUTPATIENT)
Dept: UROLOGY | Age: 65
End: 2025-08-20
Payer: COMMERCIAL

## 2025-08-20 DIAGNOSIS — C67.9 MALIGNANT NEOPLASM OF URINARY BLADDER, UNSPECIFIED SITE (HCC): Primary | ICD-10-CM

## 2025-08-20 LAB
BILIRUBIN, URINE: NEGATIVE
BLOOD URINE, POC: NEGATIVE
CHARACTER, URINE: CLEAR
COLOR, UA: YELLOW
GLUCOSE URINE: NEGATIVE MG/DL
KETONES, URINE: NEGATIVE
LEUKOCYTE CLUMPS, URINE: NEGATIVE
NITRITE, URINE: NEGATIVE
PH, URINE: 6.5 (ref 5–9)
PROTEIN, URINE: NEGATIVE MG/DL
SPECIFIC GRAVITY UA: 1.01 (ref 1–1.03)
UROBILINOGEN, URINE: 0.2 EU/DL (ref 0–1)

## 2025-08-20 PROCEDURE — 51720 TREATMENT OF BLADDER LESION: CPT

## 2025-08-20 PROCEDURE — 81003 URINALYSIS AUTO W/O SCOPE: CPT

## 2025-08-27 ENCOUNTER — CLINICAL SUPPORT (OUTPATIENT)
Dept: UROLOGY | Age: 65
End: 2025-08-27
Payer: COMMERCIAL

## 2025-08-27 DIAGNOSIS — C67.9 MALIGNANT NEOPLASM OF URINARY BLADDER, UNSPECIFIED SITE (HCC): Primary | ICD-10-CM

## 2025-08-27 DIAGNOSIS — Z85.51 HISTORY OF BLADDER CANCER: ICD-10-CM

## 2025-08-27 LAB
BILIRUBIN, URINE: NEGATIVE
BLOOD URINE, POC: NEGATIVE
CHARACTER, URINE: CLEAR
COLOR, UA: YELLOW
GLUCOSE URINE: NEGATIVE MG/DL
KETONES, URINE: NEGATIVE
LEUKOCYTE CLUMPS, URINE: ABNORMAL
NITRITE, URINE: NEGATIVE
PH, URINE: 7 (ref 5–9)
PROTEIN, URINE: NEGATIVE MG/DL
SPECIFIC GRAVITY UA: 1.01 (ref 1–1.03)
UROBILINOGEN, URINE: 0.2 EU/DL (ref 0–1)

## 2025-08-27 PROCEDURE — 51720 TREATMENT OF BLADDER LESION: CPT

## 2025-08-27 PROCEDURE — 81003 URINALYSIS AUTO W/O SCOPE: CPT

## 2025-08-28 DIAGNOSIS — F41.9 CHRONIC ANXIETY: ICD-10-CM

## 2025-08-28 DIAGNOSIS — M54.31 SCIATICA OF RIGHT SIDE: ICD-10-CM

## 2025-08-28 DIAGNOSIS — M48.02 CERVICAL STENOSIS OF SPINAL CANAL: ICD-10-CM

## 2025-08-28 DIAGNOSIS — N52.9 ERECTILE DYSFUNCTION, UNSPECIFIED ERECTILE DYSFUNCTION TYPE: ICD-10-CM

## 2025-08-28 RX ORDER — IBUPROFEN 800 MG/1
TABLET, FILM COATED ORAL
Qty: 360 TABLET | Refills: 3 | Status: SHIPPED | OUTPATIENT
Start: 2025-08-28

## 2025-08-28 RX ORDER — TADALAFIL 20 MG/1
20 TABLET ORAL DAILY PRN
Qty: 90 TABLET | Refills: 1 | Status: SHIPPED | OUTPATIENT
Start: 2025-08-28

## 2025-08-28 RX ORDER — CLONAZEPAM 0.5 MG/1
0.5 TABLET ORAL 2 TIMES DAILY PRN
Qty: 180 TABLET | Refills: 1 | Status: SHIPPED | OUTPATIENT
Start: 2025-08-28 | End: 2026-02-24

## (undated) DEVICE — SPONGE GZ W4XL4IN COT 12 PLY TYP VII WVN C FLD DSGN

## (undated) DEVICE — CATHETER URET 5FR L70CM OPN END SGL LUMN INJ HUB FLEXIMA

## (undated) DEVICE — DISPOSABLE MULTI BAG ADAPTERS Y                                    TUBING, STERILE, 2 TO A SET 6 SETS                                    PER BOX

## (undated) DEVICE — SOL IRR SOD CHL 0.9% TITAN XL CNTNR 3000ML

## (undated) DEVICE — BAG DRNGE (SEE COMMENT) UROLOGY TBL 15.5X31 IN W/HOSE

## (undated) DEVICE — CORD ENDOSCP 10FT

## (undated) DEVICE — CYSTO: Brand: MEDLINE INDUSTRIES, INC.

## (undated) DEVICE — SOLUTION IV 1000ML 0.9% SOD CHL PH 5 INJ USP VIAFLX PLAS

## (undated) DEVICE — CYSTO PACK: Brand: MEDLINE INDUSTRIES, INC.

## (undated) DEVICE — Z INACTIVE USE 2660664 SOLUTION IRRIG 3000ML 0.9% SOD CHL USP UROMATIC PLAS CONT

## (undated) DEVICE — SOLUTION SCRB 4OZ 4% CHG H2O AIDED FOR PREOPERATIVE SKIN

## (undated) DEVICE — PATIENT RETURN ELECTRODE, SINGLE-USE, CONTACT QUALITY MONITORING, ADULT, WITH 9FT CORD, FOR PATIENTS WEIGING OVER 33LBS. (15KG): Brand: MEGADYNE

## (undated) DEVICE — GLOVE ORANGE PI 7   MSG9070

## (undated) DEVICE — HF-RESECTION ELECTRODE PLASMALOOP LOOP, MEDIUM, 24 FR., 12°-30°, ESG TURIS: Brand: OLYMPUS